# Patient Record
Sex: FEMALE | Race: WHITE | Employment: PART TIME | URBAN - NONMETROPOLITAN AREA
[De-identification: names, ages, dates, MRNs, and addresses within clinical notes are randomized per-mention and may not be internally consistent; named-entity substitution may affect disease eponyms.]

---

## 2017-10-31 ENCOUNTER — OFFICE VISIT (OUTPATIENT)
Dept: UROLOGY | Age: 47
End: 2017-10-31
Payer: MEDICAID

## 2017-10-31 ENCOUNTER — ANESTHESIA (OUTPATIENT)
Dept: OPERATING ROOM | Age: 47
End: 2017-10-31
Payer: MEDICAID

## 2017-10-31 ENCOUNTER — APPOINTMENT (OUTPATIENT)
Dept: GENERAL RADIOLOGY | Age: 47
End: 2017-10-31
Attending: UROLOGY
Payer: MEDICAID

## 2017-10-31 ENCOUNTER — PREP FOR PROCEDURE (OUTPATIENT)
Dept: UROLOGY | Age: 47
End: 2017-10-31

## 2017-10-31 ENCOUNTER — ANESTHESIA EVENT (OUTPATIENT)
Dept: OPERATING ROOM | Age: 47
End: 2017-10-31
Payer: MEDICAID

## 2017-10-31 ENCOUNTER — HOSPITAL ENCOUNTER (OUTPATIENT)
Age: 47
Setting detail: OUTPATIENT SURGERY
Discharge: HOME | End: 2017-10-31
Attending: UROLOGY | Admitting: UROLOGY
Payer: MEDICAID

## 2017-10-31 VITALS
TEMPERATURE: 96.9 F | WEIGHT: 204 LBS | BODY MASS INDEX: 33.99 KG/M2 | HEIGHT: 65 IN | SYSTOLIC BLOOD PRESSURE: 133 MMHG | DIASTOLIC BLOOD PRESSURE: 76 MMHG

## 2017-10-31 VITALS
SYSTOLIC BLOOD PRESSURE: 153 MMHG | RESPIRATION RATE: 11 BRPM | TEMPERATURE: 97.4 F | OXYGEN SATURATION: 97 % | DIASTOLIC BLOOD PRESSURE: 108 MMHG

## 2017-10-31 VITALS
TEMPERATURE: 98 F | SYSTOLIC BLOOD PRESSURE: 130 MMHG | DIASTOLIC BLOOD PRESSURE: 66 MMHG | HEART RATE: 56 BPM | RESPIRATION RATE: 14 BRPM | OXYGEN SATURATION: 100 %

## 2017-10-31 DIAGNOSIS — N20.1 LEFT URETERAL STONE: ICD-10-CM

## 2017-10-31 DIAGNOSIS — N20.1 LEFT URETERAL STONE: Primary | ICD-10-CM

## 2017-10-31 DIAGNOSIS — N23 RENAL COLIC ON LEFT SIDE: ICD-10-CM

## 2017-10-31 DIAGNOSIS — N20.0 KIDNEY STONE ON LEFT SIDE: Primary | ICD-10-CM

## 2017-10-31 LAB
ALBUMIN SERPL-MCNC: 4.1 G/DL (ref 3.5–5.2)
ALP BLD-CCNC: 76 U/L (ref 35–104)
ALT SERPL-CCNC: 11 U/L (ref 5–33)
ANION GAP SERPL CALCULATED.3IONS-SCNC: 12 MMOL/L (ref 7–19)
AST SERPL-CCNC: 19 U/L (ref 5–32)
BACTERIA URINE, POC: ABNORMAL
BILIRUB SERPL-MCNC: 0.5 MG/DL (ref 0.2–1.2)
BILIRUBIN URINE: 1 MG/DL
BLOOD, URINE: POSITIVE
BUN BLDV-MCNC: 20 MG/DL (ref 6–20)
CALCIUM SERPL-MCNC: 9.3 MG/DL (ref 8.6–10)
CASTS URINE, POC: ABNORMAL
CHLORIDE BLD-SCNC: 105 MMOL/L (ref 98–111)
CLARITY: ABNORMAL
CO2: 28 MMOL/L (ref 22–29)
COLOR: YELLOW
CREAT SERPL-MCNC: 1.3 MG/DL (ref 0.5–0.9)
CRYSTALS URINE, POC: ABNORMAL
EPI CELLS URINE, POC: ABNORMAL
GFR NON-AFRICAN AMERICAN: 43
GLUCOSE BLD-MCNC: 96 MG/DL (ref 74–109)
GLUCOSE URINE: ABNORMAL
HCG(URINE) PREGNANCY TEST: NEGATIVE
HCT VFR BLD CALC: 33.4 % (ref 37–47)
HEMOGLOBIN: 10.2 G/DL (ref 12–16)
KETONES, URINE: POSITIVE
LEUKOCYTE EST, POC: ABNORMAL
MCH RBC QN AUTO: 24.8 PG (ref 27–31)
MCHC RBC AUTO-ENTMCNC: 30.5 G/DL (ref 33–37)
MCV RBC AUTO: 81.3 FL (ref 81–99)
NITRITE, URINE: NEGATIVE
PDW BLD-RTO: 14 % (ref 11.5–14.5)
PH UA: 5 (ref 4.5–8)
PLATELET # BLD: 296 K/UL (ref 130–400)
PMV BLD AUTO: 10.1 FL (ref 9.4–12.3)
POTASSIUM SERPL-SCNC: 3.8 MMOL/L (ref 3.5–4.9)
PROTEIN UA: POSITIVE
RBC # BLD: 4.11 M/UL (ref 4.2–5.4)
RBC URINE, POC: ABNORMAL
SODIUM BLD-SCNC: 145 MMOL/L (ref 136–145)
SPECIFIC GRAVITY UA: 1.03 (ref 1–1.03)
TOTAL PROTEIN: 8.2 G/DL (ref 6.6–8.7)
UROBILINOGEN, URINE: NORMAL
WBC # BLD: 8.5 K/UL (ref 4.8–10.8)
WBC URINE, POC: ABNORMAL
YEAST URINE, POC: ABNORMAL

## 2017-10-31 PROCEDURE — 88300 SURGICAL PATH GROSS: CPT

## 2017-10-31 PROCEDURE — 7100000010 HC PHASE II RECOVERY - FIRST 15 MIN: Performed by: UROLOGY

## 2017-10-31 PROCEDURE — 3700000001 HC ADD 15 MINUTES (ANESTHESIA): Performed by: UROLOGY

## 2017-10-31 PROCEDURE — 6360000002 HC RX W HCPCS: Performed by: NURSE ANESTHETIST, CERTIFIED REGISTERED

## 2017-10-31 PROCEDURE — 7100000000 HC PACU RECOVERY - FIRST 15 MIN: Performed by: UROLOGY

## 2017-10-31 PROCEDURE — 6370000000 HC RX 637 (ALT 250 FOR IP): Performed by: UROLOGY

## 2017-10-31 PROCEDURE — 82360 CALCULUS ASSAY QUANT: CPT

## 2017-10-31 PROCEDURE — 80053 COMPREHEN METABOLIC PANEL: CPT

## 2017-10-31 PROCEDURE — C2617 STENT, NON-COR, TEM W/O DEL: HCPCS | Performed by: UROLOGY

## 2017-10-31 PROCEDURE — G8484 FLU IMMUNIZE NO ADMIN: HCPCS | Performed by: PHYSICIAN ASSISTANT

## 2017-10-31 PROCEDURE — 1036F TOBACCO NON-USER: CPT | Performed by: PHYSICIAN ASSISTANT

## 2017-10-31 PROCEDURE — 2580000003 HC RX 258: Performed by: NURSE ANESTHETIST, CERTIFIED REGISTERED

## 2017-10-31 PROCEDURE — G8417 CALC BMI ABV UP PARAM F/U: HCPCS | Performed by: PHYSICIAN ASSISTANT

## 2017-10-31 PROCEDURE — 3600000004 HC SURGERY LEVEL 4 BASE: Performed by: UROLOGY

## 2017-10-31 PROCEDURE — C1769 GUIDE WIRE: HCPCS | Performed by: UROLOGY

## 2017-10-31 PROCEDURE — C1758 CATHETER, URETERAL: HCPCS | Performed by: UROLOGY

## 2017-10-31 PROCEDURE — G8428 CUR MEDS NOT DOCUMENT: HCPCS | Performed by: PHYSICIAN ASSISTANT

## 2017-10-31 PROCEDURE — 3700000000 HC ANESTHESIA ATTENDED CARE: Performed by: UROLOGY

## 2017-10-31 PROCEDURE — 85027 COMPLETE CBC AUTOMATED: CPT

## 2017-10-31 PROCEDURE — 52332 CYSTOSCOPY AND TREATMENT: CPT | Performed by: UROLOGY

## 2017-10-31 PROCEDURE — 2720000000 HC MISC SURG SUPPLY STERILE $0-50: Performed by: UROLOGY

## 2017-10-31 PROCEDURE — 81001 URINALYSIS AUTO W/SCOPE: CPT | Performed by: PHYSICIAN ASSISTANT

## 2017-10-31 PROCEDURE — C1726 CATH, BAL DIL, NON-VASCULAR: HCPCS | Performed by: UROLOGY

## 2017-10-31 PROCEDURE — 52352 CYSTOURETERO W/STONE REMOVE: CPT | Performed by: UROLOGY

## 2017-10-31 PROCEDURE — 93005 ELECTROCARDIOGRAM TRACING: CPT

## 2017-10-31 PROCEDURE — 2500000003 HC RX 250 WO HCPCS: Performed by: NURSE ANESTHETIST, CERTIFIED REGISTERED

## 2017-10-31 PROCEDURE — 6360000002 HC RX W HCPCS: Performed by: UROLOGY

## 2017-10-31 PROCEDURE — 3209999900 FLUORO FOR SURGICAL PROCEDURES

## 2017-10-31 PROCEDURE — 7100000011 HC PHASE II RECOVERY - ADDTL 15 MIN: Performed by: UROLOGY

## 2017-10-31 PROCEDURE — 3600000014 HC SURGERY LEVEL 4 ADDTL 15MIN: Performed by: UROLOGY

## 2017-10-31 PROCEDURE — C1773 RET DEV, INSERTABLE: HCPCS | Performed by: UROLOGY

## 2017-10-31 PROCEDURE — 99202 OFFICE O/P NEW SF 15 MIN: CPT | Performed by: PHYSICIAN ASSISTANT

## 2017-10-31 PROCEDURE — 7100000001 HC PACU RECOVERY - ADDTL 15 MIN: Performed by: UROLOGY

## 2017-10-31 PROCEDURE — 36415 COLL VENOUS BLD VENIPUNCTURE: CPT

## 2017-10-31 PROCEDURE — 81025 URINE PREGNANCY TEST: CPT

## 2017-10-31 DEVICE — URETERAL STENT
Type: IMPLANTABLE DEVICE | Site: URETER | Status: FUNCTIONAL
Brand: POLARIS™ ULTRA

## 2017-10-31 RX ORDER — FENTANYL CITRATE 50 UG/ML
50 INJECTION, SOLUTION INTRAMUSCULAR; INTRAVENOUS
Status: DISCONTINUED | OUTPATIENT
Start: 2017-10-31 | End: 2017-11-01 | Stop reason: HOSPADM

## 2017-10-31 RX ORDER — MORPHINE SULFATE 10 MG/ML
2 INJECTION, SOLUTION INTRAMUSCULAR; INTRAVENOUS EVERY 5 MIN PRN
Status: DISCONTINUED | OUTPATIENT
Start: 2017-10-31 | End: 2017-11-01 | Stop reason: HOSPADM

## 2017-10-31 RX ORDER — ATROPA BELLADONNA AND OPIUM 16.2; 6 MG/1; MG/1
SUPPOSITORY RECTAL PRN
Status: DISCONTINUED | OUTPATIENT
Start: 2017-10-31 | End: 2017-11-01 | Stop reason: HOSPADM

## 2017-10-31 RX ORDER — PROPOFOL 10 MG/ML
INJECTION, EMULSION INTRAVENOUS PRN
Status: DISCONTINUED | OUTPATIENT
Start: 2017-10-31 | End: 2017-10-31 | Stop reason: SDUPTHER

## 2017-10-31 RX ORDER — MORPHINE SULFATE 4 MG/ML
INJECTION, SOLUTION INTRAMUSCULAR; INTRAVENOUS PRN
Status: DISCONTINUED | OUTPATIENT
Start: 2017-10-31 | End: 2017-10-31 | Stop reason: SDUPTHER

## 2017-10-31 RX ORDER — ONDANSETRON 2 MG/ML
INJECTION INTRAMUSCULAR; INTRAVENOUS PRN
Status: DISCONTINUED | OUTPATIENT
Start: 2017-10-31 | End: 2017-10-31 | Stop reason: SDUPTHER

## 2017-10-31 RX ORDER — OXYCODONE HYDROCHLORIDE AND ACETAMINOPHEN 5; 325 MG/1; MG/1
2 TABLET ORAL EVERY 4 HOURS PRN
Status: DISCONTINUED | OUTPATIENT
Start: 2017-10-31 | End: 2017-11-01 | Stop reason: HOSPADM

## 2017-10-31 RX ORDER — TAMSULOSIN HYDROCHLORIDE 0.4 MG/1
0.4 CAPSULE ORAL DAILY
Qty: 14 CAPSULE | Refills: 1 | Status: SHIPPED | OUTPATIENT
Start: 2017-10-31 | End: 2018-03-20

## 2017-10-31 RX ORDER — MIDAZOLAM HYDROCHLORIDE 1 MG/ML
2 INJECTION INTRAMUSCULAR; INTRAVENOUS
Status: DISCONTINUED | OUTPATIENT
Start: 2017-10-31 | End: 2017-11-01 | Stop reason: HOSPADM

## 2017-10-31 RX ORDER — SODIUM CHLORIDE 0.9 % (FLUSH) 0.9 %
10 SYRINGE (ML) INJECTION PRN
Status: DISCONTINUED | OUTPATIENT
Start: 2017-10-31 | End: 2017-11-01 | Stop reason: HOSPADM

## 2017-10-31 RX ORDER — SODIUM CHLORIDE 9 MG/ML
INJECTION, SOLUTION INTRAVENOUS CONTINUOUS
Status: DISCONTINUED | OUTPATIENT
Start: 2017-10-31 | End: 2017-11-01 | Stop reason: HOSPADM

## 2017-10-31 RX ORDER — MORPHINE SULFATE 4 MG/ML
4 INJECTION, SOLUTION INTRAMUSCULAR; INTRAVENOUS EVERY 4 HOURS PRN
Status: DISCONTINUED | OUTPATIENT
Start: 2017-10-31 | End: 2017-11-01 | Stop reason: HOSPADM

## 2017-10-31 RX ORDER — ENALAPRILAT 2.5 MG/2ML
1.25 INJECTION INTRAVENOUS
Status: DISCONTINUED | OUTPATIENT
Start: 2017-10-31 | End: 2017-11-01 | Stop reason: HOSPADM

## 2017-10-31 RX ORDER — HYDRALAZINE HYDROCHLORIDE 20 MG/ML
5 INJECTION INTRAMUSCULAR; INTRAVENOUS EVERY 10 MIN PRN
Status: DISCONTINUED | OUTPATIENT
Start: 2017-10-31 | End: 2017-11-01 | Stop reason: HOSPADM

## 2017-10-31 RX ORDER — ONDANSETRON 2 MG/ML
4 INJECTION INTRAMUSCULAR; INTRAVENOUS EVERY 4 HOURS PRN
Status: DISCONTINUED | OUTPATIENT
Start: 2017-10-31 | End: 2017-11-01 | Stop reason: HOSPADM

## 2017-10-31 RX ORDER — TAMSULOSIN HYDROCHLORIDE 0.4 MG/1
0.4 CAPSULE ORAL ONCE
Status: COMPLETED | OUTPATIENT
Start: 2017-10-31 | End: 2017-10-31

## 2017-10-31 RX ORDER — METOCLOPRAMIDE HYDROCHLORIDE 5 MG/ML
10 INJECTION INTRAMUSCULAR; INTRAVENOUS
Status: DISCONTINUED | OUTPATIENT
Start: 2017-10-31 | End: 2017-11-01 | Stop reason: HOSPADM

## 2017-10-31 RX ORDER — SODIUM CHLORIDE 0.9 % (FLUSH) 0.9 %
10 SYRINGE (ML) INJECTION EVERY 12 HOURS SCHEDULED
Status: DISCONTINUED | OUTPATIENT
Start: 2017-10-31 | End: 2017-11-01 | Stop reason: HOSPADM

## 2017-10-31 RX ORDER — OXYCODONE HYDROCHLORIDE AND ACETAMINOPHEN 5; 325 MG/1; MG/1
2 TABLET ORAL EVERY 4 HOURS PRN
Qty: 30 TABLET | Refills: 0 | Status: SHIPPED | OUTPATIENT
Start: 2017-10-31 | End: 2017-11-07

## 2017-10-31 RX ORDER — PHENAZOPYRIDINE HYDROCHLORIDE 100 MG/1
100 TABLET, FILM COATED ORAL ONCE
Status: COMPLETED | OUTPATIENT
Start: 2017-10-31 | End: 2017-10-31

## 2017-10-31 RX ORDER — FENTANYL CITRATE 50 UG/ML
25 INJECTION, SOLUTION INTRAMUSCULAR; INTRAVENOUS
Status: DISCONTINUED | OUTPATIENT
Start: 2017-10-31 | End: 2017-11-01 | Stop reason: HOSPADM

## 2017-10-31 RX ORDER — CIPROFLOXACIN 500 MG/1
500 TABLET, FILM COATED ORAL 2 TIMES DAILY
Qty: 14 TABLET | Refills: 0 | Status: SHIPPED | OUTPATIENT
Start: 2017-10-31 | End: 2017-11-07

## 2017-10-31 RX ORDER — LIDOCAINE HYDROCHLORIDE 10 MG/ML
INJECTION, SOLUTION EPIDURAL; INFILTRATION; INTRACAUDAL; PERINEURAL PRN
Status: DISCONTINUED | OUTPATIENT
Start: 2017-10-31 | End: 2017-10-31 | Stop reason: SDUPTHER

## 2017-10-31 RX ORDER — SODIUM CHLORIDE, SODIUM LACTATE, POTASSIUM CHLORIDE, CALCIUM CHLORIDE 600; 310; 30; 20 MG/100ML; MG/100ML; MG/100ML; MG/100ML
INJECTION, SOLUTION INTRAVENOUS CONTINUOUS
Status: DISCONTINUED | OUTPATIENT
Start: 2017-10-31 | End: 2017-11-01 | Stop reason: HOSPADM

## 2017-10-31 RX ORDER — PHENAZOPYRIDINE HYDROCHLORIDE 100 MG/1
100 TABLET, FILM COATED ORAL 3 TIMES DAILY PRN
Qty: 30 TABLET | Refills: 1 | Status: SHIPPED | OUTPATIENT
Start: 2017-10-31 | End: 2017-12-19

## 2017-10-31 RX ORDER — ROCURONIUM BROMIDE 10 MG/ML
INJECTION, SOLUTION INTRAVENOUS PRN
Status: DISCONTINUED | OUTPATIENT
Start: 2017-10-31 | End: 2017-10-31 | Stop reason: SDUPTHER

## 2017-10-31 RX ORDER — PROMETHAZINE HYDROCHLORIDE 25 MG/ML
6.25 INJECTION, SOLUTION INTRAMUSCULAR; INTRAVENOUS
Status: DISCONTINUED | OUTPATIENT
Start: 2017-10-31 | End: 2017-11-01 | Stop reason: HOSPADM

## 2017-10-31 RX ORDER — LIDOCAINE HYDROCHLORIDE 10 MG/ML
1 INJECTION, SOLUTION EPIDURAL; INFILTRATION; INTRACAUDAL; PERINEURAL
Status: DISCONTINUED | OUTPATIENT
Start: 2017-10-31 | End: 2017-11-01 | Stop reason: HOSPADM

## 2017-10-31 RX ORDER — MORPHINE SULFATE 10 MG/ML
4 INJECTION, SOLUTION INTRAMUSCULAR; INTRAVENOUS EVERY 5 MIN PRN
Status: DISCONTINUED | OUTPATIENT
Start: 2017-10-31 | End: 2017-11-01 | Stop reason: HOSPADM

## 2017-10-31 RX ORDER — MEPERIDINE HYDROCHLORIDE 50 MG/ML
12.5 INJECTION INTRAMUSCULAR; INTRAVENOUS; SUBCUTANEOUS EVERY 5 MIN PRN
Status: DISCONTINUED | OUTPATIENT
Start: 2017-10-31 | End: 2017-11-01 | Stop reason: HOSPADM

## 2017-10-31 RX ORDER — DIPHENHYDRAMINE HYDROCHLORIDE 50 MG/ML
12.5 INJECTION INTRAMUSCULAR; INTRAVENOUS
Status: DISCONTINUED | OUTPATIENT
Start: 2017-10-31 | End: 2017-11-01 | Stop reason: HOSPADM

## 2017-10-31 RX ORDER — DEXAMETHASONE SODIUM PHOSPHATE 10 MG/ML
INJECTION INTRAMUSCULAR; INTRAVENOUS PRN
Status: DISCONTINUED | OUTPATIENT
Start: 2017-10-31 | End: 2017-10-31 | Stop reason: SDUPTHER

## 2017-10-31 RX ORDER — LABETALOL HYDROCHLORIDE 5 MG/ML
5 INJECTION, SOLUTION INTRAVENOUS EVERY 10 MIN PRN
Status: DISCONTINUED | OUTPATIENT
Start: 2017-10-31 | End: 2017-11-01 | Stop reason: HOSPADM

## 2017-10-31 RX ORDER — SODIUM CHLORIDE, SODIUM LACTATE, POTASSIUM CHLORIDE, CALCIUM CHLORIDE 600; 310; 30; 20 MG/100ML; MG/100ML; MG/100ML; MG/100ML
INJECTION, SOLUTION INTRAVENOUS CONTINUOUS PRN
Status: DISCONTINUED | OUTPATIENT
Start: 2017-10-31 | End: 2017-10-31 | Stop reason: SDUPTHER

## 2017-10-31 RX ADMIN — MORPHINE SULFATE 4 MG: 4 INJECTION INTRAVENOUS at 20:15

## 2017-10-31 RX ADMIN — TAMSULOSIN HYDROCHLORIDE 0.4 MG: 0.4 CAPSULE ORAL at 21:14

## 2017-10-31 RX ADMIN — ONDANSETRON HYDROCHLORIDE 4 MG: 2 SOLUTION INTRAMUSCULAR; INTRAVENOUS at 20:34

## 2017-10-31 RX ADMIN — SUGAMMADEX 250 MG: 100 INJECTION, SOLUTION INTRAVENOUS at 20:44

## 2017-10-31 RX ADMIN — CEFTRIAXONE 1 G: 1 INJECTION, SOLUTION INTRAVENOUS at 20:23

## 2017-10-31 RX ADMIN — SODIUM CHLORIDE, SODIUM LACTATE, POTASSIUM CHLORIDE, AND CALCIUM CHLORIDE: 600; 310; 30; 20 INJECTION, SOLUTION INTRAVENOUS at 20:06

## 2017-10-31 RX ADMIN — DEXAMETHASONE SODIUM PHOSPHATE 10 MG: 10 INJECTION INTRAMUSCULAR; INTRAVENOUS at 20:27

## 2017-10-31 RX ADMIN — SODIUM CHLORIDE, SODIUM LACTATE, POTASSIUM CHLORIDE, AND CALCIUM CHLORIDE: 600; 310; 30; 20 INJECTION, SOLUTION INTRAVENOUS at 20:34

## 2017-10-31 RX ADMIN — PHENAZOPYRIDINE HYDROCHLORIDE 100 MG: 100 TABLET ORAL at 21:14

## 2017-10-31 RX ADMIN — PROPOFOL 200 MG: 10 INJECTION, EMULSION INTRAVENOUS at 20:15

## 2017-10-31 RX ADMIN — ROCURONIUM BROMIDE 40 MG: 10 INJECTION INTRAVENOUS at 20:16

## 2017-10-31 RX ADMIN — LIDOCAINE HYDROCHLORIDE 50 MG: 10 INJECTION, SOLUTION EPIDURAL; INFILTRATION; INTRACAUDAL; PERINEURAL at 20:15

## 2017-10-31 ASSESSMENT — PAIN SCALES - GENERAL: PAINLEVEL_OUTOF10: 2

## 2017-10-31 ASSESSMENT — ENCOUNTER SYMPTOMS
EYE PAIN: 0
WHEEZING: 0
BLURRED VISION: 0
SHORTNESS OF BREATH: 0
VOMITING: 0
BACK PAIN: 0
SINUS PAIN: 0
ABDOMINAL PAIN: 0

## 2017-10-31 ASSESSMENT — PAIN DESCRIPTION - DESCRIPTORS: DESCRIPTORS: SORE

## 2017-10-31 ASSESSMENT — PAIN DESCRIPTION - LOCATION: LOCATION: BACK

## 2017-10-31 ASSESSMENT — PAIN DESCRIPTION - ORIENTATION: ORIENTATION: LEFT

## 2017-10-31 ASSESSMENT — PAIN DESCRIPTION - PAIN TYPE
TYPE: SURGICAL PAIN
TYPE: SURGICAL PAIN

## 2017-10-31 NOTE — H&P
Place 1 drop into both eyes daily         L-Lysine 1000 MG TABS Take by mouth Take one tablet on Fridays        naproxen (NAPROSYN) 500 MG tablet Take 1 tablet by mouth daily (Patient taking differently: Take 500 mg by mouth daily as needed ) 60 tablet 5      No current facility-administered medications for this visit.                   Allergies   Allergen Reactions    Clindamycin/Lincomycin Other (See Comments)       Pt had rectal bleeding while on this medication         Social History   Social History            Social History    Marital status: Single       Spouse name: N/A    Number of children: N/A    Years of education: N/A            Social History Main Topics    Smoking status: Former Smoker       Packs/day: 0.50       Years: 5.00       Types: Cigarettes       Quit date: 1/1/2004    Smokeless tobacco: Never Used    Alcohol use No    Drug use: Unknown    Sexual activity: Not Asked           Other Topics Concern    None          Social History Narrative    None            Family History         Family History   Problem Relation Age of Onset    Substance Abuse Father      Heart Disease Paternal Grandmother              REVIEW OF SYSTEMS:  Review of Systems   Constitutional: Negative for malaise/fatigue and weight loss. HENT: Negative for nosebleeds and sinus pain. Eyes: Negative for blurred vision and pain. Respiratory: Negative for shortness of breath and wheezing. Cardiovascular: Negative for palpitations and leg swelling. Gastrointestinal: Negative for abdominal pain and vomiting. Genitourinary: Positive for flank pain, frequency, hematuria and urgency. Negative for dysuria. Musculoskeletal: Negative for back pain and myalgias. Skin: Negative for itching and rash. Neurological: Negative for tremors and sensory change. Endo/Heme/Allergies: Negative for environmental allergies and polydipsia. Psychiatric/Behavioral: Negative for hallucinations.  The patient is not nervous/anxious.          PHYSICAL EXAM:  /76   Temp 96.9 °F (36.1 °C) (Temporal)   Ht 5' 5\" (1.651 m)   Wt 204 lb (92.5 kg)   BMI 33.95 kg/m²   Physical Exam   Constitutional: No distress. HENT:   Mouth/Throat: No oropharyngeal exudate. Eyes: Left eye exhibits no discharge. No scleral icterus. Neck: No JVD present. No tracheal deviation present. No thyromegaly present. Cardiovascular: Exam reveals no gallop and no friction rub. Pulmonary/Chest: No stridor. She has no rales. Abdominal: She exhibits no distension and no mass. There is no rebound and no guarding. Musculoskeletal: She exhibits no edema. Neurological: No cranial nerve deficit. She exhibits normal muscle tone. Coordination normal.   Skin: She is not diaphoretic. No pallor.               DATA:  U/A:          Lab Results   Component Value Date     COLORU Yellow 10/31/2017     PROTEINU Positive 10/31/2017     PHUR 5.0 10/31/2017     CLARITYU Cloudy 10/31/2017     SPECGRAV 1.030 10/31/2017     LEUKOCYTESUR trace 10/31/2017     UROBILINOGEN Normal 10/31/2017     BILIRUBINUR 1 10/31/2017     BLOODU Positive 10/31/2017     GLUCOSEU neg 10/31/2017             Imaging:  CT reveals 7 mm left distal UVJ stone with some obstructive changes.     1. Kidney stone on left side     - POC URINE with Microscopic     2. Left ureteral stone  Plan on cystoscopy left ureteroscopy laser lithotripsy placement of double-J stent.     3.  Renal colic on left side     Discussed with Dr. Grady Mistry patient will be added on this afternoon for cystoscopy left ureteroscopy laser lithotripsy patient has been nothing by mouth since last night.         Orders Placed This Encounter   Procedures    POC URINE with Microscopic        Encounter Medications    No orders of the defined types were placed in this encounter.      Plan:  Plan on cystoscopy left ureteroscopy laser lithotripsy placement of left double-J stent.             Office Visit on 10/31/2017      Revision History            Detailed Report           Note shared with patient   Progress Notes Info     Author Note Status Last Update User   Amira Fraser PA-C Signed Amira Fraser PA-C   Last Update Date/Time: 10/31/2017  2:56 PM   Chart Review Routing History     Routing history could not be found for this note. This is because the note has never been routed or because communication record creation was suppressed.

## 2017-10-31 NOTE — PROGRESS NOTES
Laura James is a 50 y.o. female who presents today   Chief Complaint   Patient presents with    New Patient     i am here today fu from [facility] [place] kidney stone     Urolithiasis  Patient complains of left abdominal pain and flank pain without radiation to the groin. Onset of symptoms was abrupt 1 day ago with unchanged course since that time. Patient describes the pain as colicky and cramping, continuous and rated as severe. The patient has had nausea and vomiting and no diaphoresis. There has been no fever or chills. The patient is not complaining of dysuria or frequency. No previous history of stones. Went to [facility] last night CT scan reveals approximate 7 mm left UPJ stone with moderate obstructive changes. Patient has been nothing by mouth. Past Medical History:   Diagnosis Date    Anemia     Chronic cholecystitis without calculus 6/6/2016    Degenerative disc disease     Glaucoma (increased eye pressure)     candidate for due to high pressure/ preventative eye drops       Past Surgical History:   Procedure Laterality Date    CHOLECYSTECTOMY  06/06/2016    CHOLECYSTECTOMY  06/06/2016       Current Outpatient Prescriptions   Medication Sig Dispense Refill    fluticasone (FLONASE) 50 MCG/ACT nasal spray 2 sprays by Nasal route daily 1 Bottle 3    cetirizine (ZYRTEC ALLERGY) 10 MG tablet Take 1 tablet by mouth daily 30 tablet 3    omeprazole (PRILOSEC) 20 MG delayed release capsule Take 1 capsule by mouth Daily 30 capsule 5    Psyllium (METAMUCIL PO) Take by mouth      Loratadine 10 MG CAPS Take by mouth daily Indications:  Allergic Rhinitis      timolol (TIMOPTIC) 0.5 % ophthalmic solution Place 1 drop into both eyes daily       L-Lysine 1000 MG TABS Take by mouth Take one tablet on Fridays      naproxen (NAPROSYN) 500 MG tablet Take 1 tablet by mouth daily (Patient taking differently: Take 500 mg by mouth daily as needed ) 60 tablet 5     No current facility-administered medications for this visit. Allergies   Allergen Reactions    Clindamycin/Lincomycin Other (See Comments)     Pt had rectal bleeding while on this medication       Social History     Social History    Marital status: Single     Spouse name: N/A    Number of children: N/A    Years of education: N/A     Social History Main Topics    Smoking status: Former Smoker     Packs/day: 0.50     Years: 5.00     Types: Cigarettes     Quit date: 1/1/2004    Smokeless tobacco: Never Used    Alcohol use No    Drug use: Unknown    Sexual activity: Not Asked     Other Topics Concern    None     Social History Narrative    None       Family History   Problem Relation Age of Onset    Substance Abuse Father     Heart Disease Paternal Grandmother        REVIEW OF SYSTEMS:  Review of Systems   Constitutional: Negative for malaise/fatigue and weight loss. HENT: Negative for nosebleeds and sinus pain. Eyes: Negative for blurred vision and pain. Respiratory: Negative for shortness of breath and wheezing. Cardiovascular: Negative for palpitations and leg swelling. Gastrointestinal: Negative for abdominal pain and vomiting. Genitourinary: Positive for flank pain, frequency, hematuria and urgency. Negative for dysuria. Musculoskeletal: Negative for back pain and myalgias. Skin: Negative for itching and rash. Neurological: Negative for tremors and sensory change. Endo/Heme/Allergies: Negative for environmental allergies and polydipsia. Psychiatric/Behavioral: Negative for hallucinations. The patient is not nervous/anxious. PHYSICAL EXAM:  /76   Temp 96.9 °F (36.1 °C) (Temporal)   Ht 5' 5\" (1.651 m)   Wt 204 lb (92.5 kg)   BMI 33.95 kg/m²   Physical Exam   Constitutional: No distress. HENT:   Mouth/Throat: No oropharyngeal exudate. Eyes: Left eye exhibits no discharge. No scleral icterus. Neck: No JVD present. No tracheal deviation present. No thyromegaly present.    Cardiovascular: Exam reveals no gallop and no friction rub. Pulmonary/Chest: No stridor. She has no rales. Abdominal: She exhibits no distension and no mass. There is no rebound and no guarding. Musculoskeletal: She exhibits no edema. Neurological: No cranial nerve deficit. She exhibits normal muscle tone. Coordination normal.   Skin: She is not diaphoretic. No pallor. DATA:  U/A:    Lab Results   Component Value Date    COLORU Yellow 10/31/2017    PROTEINU Positive 10/31/2017    PHUR 5.0 10/31/2017    CLARITYU Cloudy 10/31/2017    SPECGRAV 1.030 10/31/2017    LEUKOCYTESUR trace 10/31/2017    UROBILINOGEN Normal 10/31/2017    BILIRUBINUR 1 10/31/2017    BLOODU Positive 10/31/2017    GLUCOSEU neg 10/31/2017           Imaging:  CT reveals 7 mm left distal UVJ stone with some obstructive changes. 1. Kidney stone on left side    - POC URINE with Microscopic    2. Left ureteral stone  Plan on cystoscopy left ureteroscopy laser lithotripsy placement of double-J stent. 3. Renal colic on left side    Discussed with Dr. Marilu Ortega patient will be added on this afternoon for cystoscopy left ureteroscopy laser lithotripsy patient has been nothing by mouth since last night. Orders Placed This Encounter   Procedures    POC URINE with Microscopic     No orders of the defined types were placed in this encounter. Plan:  Plan on cystoscopy left ureteroscopy laser lithotripsy placement of left double-J stent.

## 2017-10-31 NOTE — ANESTHESIA PRE PROCEDURE
for: PROTIME, INR, APTT    HCG (If Applicable):   Lab Results   Component Value Date    PREGTESTUR Negative 06/06/2016        ABGs: No results found for: PHART, PO2ART, OVT3FHQ, WQD0HGM, BEART, N6CWMSMG     Type & Screen (If Applicable):  No results found for: LABABO, [address]    Anesthesia Evaluation  Patient summary reviewed and Nursing notes reviewed no history of anesthetic complications:   Airway: Mallampati: II  TM distance: <3 FB   Neck ROM: full  Mouth opening: > = 3 FB Dental:          Pulmonary:negative ROS breath sounds clear to auscultation                             Cardiovascular:  Exercise tolerance: good (>4 METS),       (-) pacemaker, hypertension, past MI, CABG/stent and dysrhythmias    ECG reviewed  Rhythm: regular             Beta Blocker:  Not on Beta Blocker      ROS comment: Ekg:  Sinus arrythmia  Rate 62     Neuro/Psych:      (-) seizures and CVA           GI/Hepatic/Renal:   (+) GERD (food related only): no interval change, renal disease: kidney stones,      (-) liver disease       Endo/Other:        (-) hypothyroidism, blood dyscrasia, no Type II DM               Abdominal:         (-) obese Abdomen: soft. Vascular:     - DVT and PE. Anesthesia Plan      general     ASA 2     (Pregnancy test pending. Please confirm prior to OR.)  Induction: intravenous. BIS  MIPS: Postoperative opioids intended and Prophylactic antiemetics administered. Anesthetic plan and risks discussed with patient. Use of blood products discussed with patient whom consented to blood products.                    Lillian Michael DO   10/31/2017

## 2017-11-01 LAB
EKG P AXIS: 52 DEGREES
EKG P-R INTERVAL: 132 MS
EKG Q-T INTERVAL: 444 MS
EKG QRS DURATION: 74 MS
EKG QTC CALCULATION (BAZETT): 444 MS
EKG T AXIS: 41 DEGREES

## 2017-11-01 NOTE — BRIEF OP NOTE
Urology Brief Op Note    Patient Name: Vasiliy Andrews    : 1970    MRN: [id number]    Pre-operative Diagnosis: STONE left ureter    Post-operative Diagnosis: Same     Procedure: Procedure(s):  CYSTOSCOPY URETEROSCOPY stone basket removal STENT PLACEMENT    Anesthesia: General    Surgeon: Bonifacio Correa MD    Assistants: Scrub Person First: Calixto Ibrahim    Estimated Blood Loss: * No values recorded between 10/31/2017  8:11 PM and 10/31/2017  1:81 PM *    Complications: none    Findings: distal left stone 5x28 DJ stent with string    Specimens:    ID Type Source Tests Collected by Time Destination   A : left ureteral stone Tissue Ureter SURGICAL PATHOLOGY Bonifacio Corera MD 10/31/2017 2038        Disposition/Plan: To PACU in stable condition. Then to [facility] out patient.       Bonifacio Correa MD  10/31/2017  8:52 PM

## 2017-11-01 NOTE — OP NOTE
[facility] [address] [facility]                               OPERATIVE REPORT    PATIENT NAME: Laxmi Perez                         :             1970  MED REC NO:   [id number]                               ROOM:  ACCOUNT NO:   [de-identified]                            ADMISSION DATE:  10/31/2017  PROVIDER:     Trinidad Infante MD      DATE OF PROCEDURE:  10/31/2017    PREOPERATIVE DIAGNOSIS:  Left ureteral calculus. POSTOPERATIVE DIAGNOSIS:  Left ureteral calculus. OPERATION PERFORMED:  Left ureteroscopy, stone basket extraction and  placement of a 5-Citizen of [place] x 28 cm left double-J ureteral stent. ATTENDING SURGEON:  Trinidad Infante MD    ANESTHESIA:  General.    HISTORY:  The patient is 49-year-old female who was in [facility]  Emergency Room yesterday, came to the office today with symptoms  complaining of severe left renal colic. A CT scan showed a stone in  the distal left ureter measuring approximately 7 mm. Therefore,  because of severity of her symptoms, she was offered urologic  intervention and she now presents to undergo left ureteroscopy, laser  lithotripsy, stone extraction and stent placement. Risks and  complications of procedure have been discussed with her. DESCRIPTION OF PROCEDURE:  The patient was brought to the operating  room, underwent general anesthetic. She was placed in the lithotomy  position. Genitalia was prepped and draped per routine sterile  fashion. Time-out was performed. She received a preoperative  antibiotic. A 22-Citizen of [place] cystoscope was inserted into the meatus. Bladder was  briefly inspected with 30-degree lens and appeared to be normal.  The  left ureteral orifice was then intubated with a 5-Citizen of [place] open-ended  catheter, contrast was injected retrograde to opacify the distal left  ureter, and her filling defect was seen in the distal left ureter  consistent with a stone.   With the aid of the catheter, I passed a  0.035 sensor tip guidewire up and advanced this under fluoroscopic  guidance up into the kidney. Over this guidewire, a 5 mm balloon  dilator was used to dilate the orifice. I then did ureteroscopy with a mini semi-rigid ureteroscope. Stone  was seen within the lumen of the ureter. This was engaged in a [place]  basket and the stone was extracted without difficulty. Tonyverenice Joyce was sent  for analysis. Guidewire was backloaded with the cystoscope. Cystoscope was advanced  in the bladder. I then passed a 5-Iranian catheter up into the left  kidney which was hydronephrotic. Contrast was injected to opacify the  kidney and measured for the stent. I then placed a 5-Iranian x 28 cm left double-J ureteral stent, using  cystoscopic and fluoroscopic guidance. String was left on the end of  the stent. The patient's bladder was emptied. Procedure was  terminated. She was awakened from her anesthetic and taken to the recovery room in  stable condition.   She will follow up in 1 week for stent removal.        Tran Klein MD    D: 10/31/2017 21:51:12       T: 11/01/2017 3:09:38     [id number]  Job#: [id number]     Doc#: [id number]

## 2017-11-01 NOTE — ANESTHESIA POSTPROCEDURE EVALUATION
Department of Anesthesiology  Postprocedure Note    Patient: Carolee Holter  MRN: [id number]  YOB: 1970  Date of evaluation: 10/31/2017  Time:  8:56 PM     Procedure Summary     Date:  10/31/17 Room / Location:  [facility] OR [facility] / [facility] OR    Anesthesia Start:  2006 Anesthesia Stop:  2056    Procedure:  CYSTOSCOPY URETEROSCOPY stone removal STENT PLACEMENT (Left ) Diagnosis:  (STONE)    Surgeon:  Danelle oRgers MD Responsible Provider:  Kayla Winn CRNA    Anesthesia Type:  general ASA Status:  2          Anesthesia Type: general    Chaya Phase I: Chaya Score: 10    Chaya Phase II:      Last vitals: Reviewed and per EMR flowsheets.        Anesthesia Post Evaluation    Patient location during evaluation: PACU  Patient participation: complete - patient participated  Level of consciousness: awake and alert  Pain score: 0  Airway patency: patent  Nausea & Vomiting: no nausea and no vomiting  Complications: no  Cardiovascular status: hemodynamically stable  Respiratory status: spontaneous ventilation and room air  Hydration status: stable

## 2017-11-01 NOTE — DISCHARGE INSTR - DIET
 Good nutrition is important when healing from an illness, injury, or surgery. Follow any nutrition recommendations given to you during your hospital stay.    RESUME USUAL DIET

## 2017-11-05 LAB
CALCULI COMPOSITION: NORMAL
MASS: 64 MG
STONE DESCRIPTION: NORMAL
STONE NUMBER: 1
STONE SIZE: NORMAL MM

## 2017-11-07 ENCOUNTER — OFFICE VISIT (OUTPATIENT)
Dept: UROLOGY | Age: 47
End: 2017-11-07
Payer: MEDICAID

## 2017-11-07 VITALS — TEMPERATURE: 98.2 F | WEIGHT: 204 LBS | BODY MASS INDEX: 33.99 KG/M2 | HEIGHT: 65 IN

## 2017-11-07 DIAGNOSIS — N20.1 LEFT URETERAL STONE: Primary | ICD-10-CM

## 2017-11-07 PROCEDURE — G8484 FLU IMMUNIZE NO ADMIN: HCPCS | Performed by: PHYSICIAN ASSISTANT

## 2017-11-07 PROCEDURE — G8428 CUR MEDS NOT DOCUMENT: HCPCS | Performed by: PHYSICIAN ASSISTANT

## 2017-11-07 PROCEDURE — G8417 CALC BMI ABV UP PARAM F/U: HCPCS | Performed by: PHYSICIAN ASSISTANT

## 2017-11-07 PROCEDURE — 99213 OFFICE O/P EST LOW 20 MIN: CPT | Performed by: PHYSICIAN ASSISTANT

## 2017-11-07 PROCEDURE — 1036F TOBACCO NON-USER: CPT | Performed by: PHYSICIAN ASSISTANT

## 2017-11-07 ASSESSMENT — ENCOUNTER SYMPTOMS
WHEEZING: 0
BACK PAIN: 0
SHORTNESS OF BREATH: 0
BLURRED VISION: 0
SINUS PAIN: 0
EYE PAIN: 0
VOMITING: 0
ABDOMINAL PAIN: 0

## 2017-11-07 NOTE — LETTER
[facility] UROLOGY   [address] [facility] [address]  [address], [place] [address]  PHONE [phone]    Patient Name: Chyna Ovalle  YOB: 1970                                         _____LOCATION: [facility]    _____LOCATION: [address] [place] [address]        DATE:  TIME:        RENAL ULTRA SOUND PREP    DO NOT EAT OR DRINK ANYTHING AFTER MIDNIGHT NIGHT PRIOR.    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

## 2017-12-18 ENCOUNTER — HOSPITAL ENCOUNTER (OUTPATIENT)
Dept: ULTRASOUND IMAGING | Age: 47
Discharge: HOME | End: 2017-12-18
Payer: MEDICAID

## 2017-12-18 DIAGNOSIS — N20.1 LEFT URETERAL STONE: ICD-10-CM

## 2017-12-18 PROCEDURE — 76770 US EXAM ABDO BACK WALL COMP: CPT

## 2017-12-19 ENCOUNTER — OFFICE VISIT (OUTPATIENT)
Dept: UROLOGY | Age: 47
End: 2017-12-19
Payer: MEDICAID

## 2017-12-19 VITALS
HEIGHT: 65 IN | BODY MASS INDEX: 34.99 KG/M2 | HEART RATE: 84 BPM | WEIGHT: 210 LBS | SYSTOLIC BLOOD PRESSURE: 122 MMHG | DIASTOLIC BLOOD PRESSURE: 74 MMHG | TEMPERATURE: 97.4 F

## 2017-12-19 DIAGNOSIS — N20.1 LEFT URETERAL STONE: Primary | ICD-10-CM

## 2017-12-19 LAB
BACTERIA URINE, POC: NORMAL
BILIRUBIN URINE: 0 MG/DL
BLOOD, URINE: POSITIVE
CASTS URINE, POC: NORMAL
CLARITY: NORMAL
COLOR: NORMAL
CRYSTALS URINE, POC: NORMAL
EPI CELLS URINE, POC: NORMAL
GLUCOSE URINE: NORMAL
KETONES, URINE: NEGATIVE
LEUKOCYTE EST, POC: NORMAL
NITRITE, URINE: NEGATIVE
PH UA: 5 (ref 4.5–8)
PROTEIN UA: NEGATIVE
RBC URINE, POC: NORMAL
SPECIFIC GRAVITY UA: 1.02 (ref 1–1.03)
UROBILINOGEN, URINE: NORMAL
WBC URINE, POC: NORMAL
YEAST URINE, POC: NORMAL

## 2017-12-19 PROCEDURE — G8427 DOCREV CUR MEDS BY ELIG CLIN: HCPCS | Performed by: PHYSICIAN ASSISTANT

## 2017-12-19 PROCEDURE — 99213 OFFICE O/P EST LOW 20 MIN: CPT | Performed by: PHYSICIAN ASSISTANT

## 2017-12-19 PROCEDURE — 1036F TOBACCO NON-USER: CPT | Performed by: PHYSICIAN ASSISTANT

## 2017-12-19 PROCEDURE — 81000 URINALYSIS NONAUTO W/SCOPE: CPT | Performed by: PHYSICIAN ASSISTANT

## 2017-12-19 PROCEDURE — G8484 FLU IMMUNIZE NO ADMIN: HCPCS | Performed by: PHYSICIAN ASSISTANT

## 2017-12-19 PROCEDURE — G8417 CALC BMI ABV UP PARAM F/U: HCPCS | Performed by: PHYSICIAN ASSISTANT

## 2017-12-19 RX ORDER — NICOTINE POLACRILEX 4 MG/1
GUM, CHEWING ORAL
Refills: 5 | COMMUNITY
Start: 2017-12-12 | End: 2017-12-19 | Stop reason: SDUPTHER

## 2017-12-19 ASSESSMENT — ENCOUNTER SYMPTOMS
HEARTBURN: 0
NAUSEA: 0
SHORTNESS OF BREATH: 0
EYE DISCHARGE: 0
WHEEZING: 0
EYE REDNESS: 0

## 2017-12-19 NOTE — PROGRESS NOTES
Allergen Reactions    Clindamycin/Lincomycin Other (See Comments)     Pt had rectal bleeding while on this medication       Social History     Social History    Marital status: Single     Spouse name: N/A    Number of children: N/A    Years of education: N/A     Social History Main Topics    Smoking status: Former Smoker     Packs/day: 0.50     Years: 5.00     Types: Cigarettes     Quit date: 1/1/2004    Smokeless tobacco: Never Used    Alcohol use No    Drug use: No    Sexual activity: No     Other Topics Concern    None     Social History Narrative    None       Family History   Problem Relation Age of Onset    Substance Abuse Father     Heart Disease Paternal Grandmother        REVIEW OF SYSTEMS:  Review of Systems   Constitutional: Negative for chills and fever. HENT: Negative for hearing loss. Eyes: Negative for discharge and redness. Respiratory: Negative for shortness of breath and wheezing. Cardiovascular: Negative for leg swelling and PND. Gastrointestinal: Negative for heartburn and nausea. Genitourinary: Negative for dysuria, flank pain, frequency, hematuria and urgency. Musculoskeletal: Negative for myalgias and neck pain. Skin: Negative for itching and rash. Neurological: Negative for seizures, loss of consciousness and headaches. Endo/Heme/Allergies: Negative for environmental allergies and polydipsia. Psychiatric/Behavioral: Negative for depression and suicidal ideas. PHYSICAL EXAM:  /74   Pulse 84   Temp 97.4 °F (36.3 °C) (Temporal)   Ht 5' 5\" (1.651 m)   Wt 210 lb (95.3 kg)   BMI 34.95 kg/m²   Physical Exam   Constitutional: No distress. HENT:   Mouth/Throat: No oropharyngeal exudate. Eyes: Left eye exhibits no discharge. No scleral icterus. Neck: No JVD present. No tracheal deviation present. No thyromegaly present. Cardiovascular: Exam reveals no gallop and no friction rub. Pulmonary/Chest: No stridor. She has no rales. Abdominal: She exhibits no distension and no mass. There is no rebound and no guarding. Musculoskeletal: She exhibits no edema. Neurological: No cranial nerve deficit. She exhibits normal muscle tone. Coordination normal.   Skin: She is not diaphoretic. No pallor. DATA:  U/A:    Lab Results   Component Value Date    COLORU Yellow 10/31/2017    PROTEINU Negative 12/19/2017    PHUR 5.0 12/19/2017    CLARITYU Cloudy 10/31/2017    SPECGRAV 1.025 12/19/2017    LEUKOCYTESUR Trace 12/19/2017    UROBILINOGEN Normal 12/19/2017    BILIRUBINUR 0 12/19/2017    BLOODU Positive 12/19/2017    GLUCOSEU neg 12/19/2017           Imaging:  TECHNIQUE: Multiple longitudinal and transverse realtime sonographic   images of the kidneys and urinary bladder are obtained. FINDINGS:    The right kidney measures 4.5 x 5.2 x 9.5 cm. The right kidney is   normal in size, shape, contour and position. The cortical thickness is   normal, with preservation of the corticomedullary differentiation. The   central echo complex is compact with no evidence for hydronephrosis. No nephrolithiasis or abnormal perinephric fluid collections are seen. No hydroureter. The left kidney measures 5.1 x 5.4 x 11 cm. The left kidney is normal   in size, shape, contour and position. The cortical thickness is   normal, with preservation of the corticomedullary differentiation. The   central echo complex is compact with no evidence for hydronephrosis. No nephrolithiasis or abnormal perinephric fluid collections are seen. No hydroureter. Scanning through the pelvis reveals the bladder to be moderately   distended with anechoic urine. The wall thickness and contour are   normal. There is no surrounding ascites. 1. Left ureteral stone  Symptomatically patient is doing well. Renal ultrasound is negative. Patient had left ureteroscopy 10/31/17.  We discussed metabolic stone evaluation however she would like to wait and I will have her return in 3 months with a KUB if KUB is negative in 3 months we'll suggest follow-up in 6 months. - XR ABDOMEN (KUB) (SINGLE AP VIEW); Future  - POC Urine with Microscopic      Orders Placed This Encounter   Procedures    XR ABDOMEN (KUB) (SINGLE AP VIEW)     Standing Status:   Future     Standing Expiration Date:   12/19/2018     Order Specific Question:   Reason for exam:     Answer:   kidney stones    POC Urine with Microscopic     No orders of the defined types were placed in this encounter. Plan:  Follow-up in 3 months with KUB.

## 2018-03-20 ENCOUNTER — OFFICE VISIT (OUTPATIENT)
Dept: UROLOGY | Age: 48
End: 2018-03-20
Payer: MEDICAID

## 2018-03-20 ENCOUNTER — HOSPITAL ENCOUNTER (OUTPATIENT)
Dept: GENERAL RADIOLOGY | Age: 48
Discharge: HOME | End: 2018-03-20
Payer: MEDICAID

## 2018-03-20 VITALS
HEART RATE: 98 BPM | TEMPERATURE: 97.7 F | BODY MASS INDEX: 35.32 KG/M2 | OXYGEN SATURATION: 98 % | DIASTOLIC BLOOD PRESSURE: 74 MMHG | HEIGHT: 65 IN | WEIGHT: 212 LBS | SYSTOLIC BLOOD PRESSURE: 132 MMHG

## 2018-03-20 DIAGNOSIS — N20.1 LEFT URETERAL STONE: ICD-10-CM

## 2018-03-20 DIAGNOSIS — N20.1 LEFT URETERAL STONE: Primary | ICD-10-CM

## 2018-03-20 LAB
APPEARANCE FLUID: CLEAR
BILIRUBIN, POC: NORMAL
BLOOD URINE, POC: NORMAL
CLARITY, POC: NORMAL
COLOR, POC: NORMAL
GLUCOSE URINE, POC: NORMAL
KETONES, POC: NORMAL
LEUKOCYTE EST, POC: NORMAL
NITRITE, POC: NORMAL
PH, POC: 5.5
PROTEIN, POC: NORMAL
SPECIFIC GRAVITY, POC: 1.02
UROBILINOGEN, POC: 0.2

## 2018-03-20 PROCEDURE — G8417 CALC BMI ABV UP PARAM F/U: HCPCS | Performed by: PHYSICIAN ASSISTANT

## 2018-03-20 PROCEDURE — G8427 DOCREV CUR MEDS BY ELIG CLIN: HCPCS | Performed by: PHYSICIAN ASSISTANT

## 2018-03-20 PROCEDURE — 3017F COLORECTAL CA SCREEN DOC REV: CPT | Performed by: PHYSICIAN ASSISTANT

## 2018-03-20 PROCEDURE — G8484 FLU IMMUNIZE NO ADMIN: HCPCS | Performed by: PHYSICIAN ASSISTANT

## 2018-03-20 PROCEDURE — 81003 URINALYSIS AUTO W/O SCOPE: CPT | Performed by: PHYSICIAN ASSISTANT

## 2018-03-20 PROCEDURE — 99213 OFFICE O/P EST LOW 20 MIN: CPT | Performed by: PHYSICIAN ASSISTANT

## 2018-03-20 PROCEDURE — 74018 RADEX ABDOMEN 1 VIEW: CPT

## 2018-03-20 PROCEDURE — 1036F TOBACCO NON-USER: CPT | Performed by: PHYSICIAN ASSISTANT

## 2018-03-20 RX ORDER — NICOTINE POLACRILEX 4 MG/1
GUM, CHEWING ORAL
Refills: 5 | COMMUNITY
Start: 2018-03-08 | End: 2020-07-10 | Stop reason: SDUPTHER

## 2018-03-20 ASSESSMENT — ENCOUNTER SYMPTOMS
WHEEZING: 0
HEARTBURN: 0
NAUSEA: 0
SHORTNESS OF BREATH: 0
EYE DISCHARGE: 0
EYE REDNESS: 0

## 2018-03-20 NOTE — PROGRESS NOTES
Janis Huggins is a 46 y.o. female who presents today   Chief Complaint   Patient presents with    Follow-up     I am here today for follow up after my KUB for left kidney stone.  Nephrolithiasis     Follow-up history of ureteral stones:  Patient is a 70-year-old female who had left ureteroscopic copy stone and basket extraction and placement of double-J stent was done 10/31/17. She has done well since that visit. She denies any current symptoms. She denies any fever chills nausea vomiting flank pain. She denies any burning with urination or any other lower urinary tract symptoms. Past Medical History:   Diagnosis Date    Anemia     Chronic cholecystitis without calculus 6/6/2016    Degenerative disc disease     Glaucoma (increased eye pressure)     candidate for due to high pressure/ preventative eye drops       Past Surgical History:   Procedure Laterality Date    CHOLECYSTECTOMY  06/06/2016    CHOLECYSTECTOMY  06/06/2016    [place] CYSTO/URETERO/PYELOSCOPY W/LITHOTRIPSY Left 10/31/2017    CYSTOSCOPY URETEROSCOPY stone removal STENT PLACEMENT performed by Jimena Kong [facility] at [facility] OR       Current Outpatient Prescriptions   Medication Sig Dispense Refill    omeprazole 20 MG EC tablet TAKE 1 TABLET EVERY DAY  5    fluticasone (FLONASE) 50 MCG/ACT nasal spray 2 sprays by Nasal route daily 1 Bottle 3    cetirizine (ZYRTEC ALLERGY) 10 MG tablet Take 1 tablet by mouth daily 30 tablet 3    Psyllium (METAMUCIL PO) Take by mouth      timolol (TIMOPTIC) 0.5 % ophthalmic solution Place 1 drop into both eyes daily       L-Lysine 1000 MG TABS Take by mouth Take one tablet on Fridays      naproxen (NAPROSYN) 500 MG tablet Take 1 tablet by mouth daily (Patient taking differently: Take 500 mg by mouth daily as needed ) 60 tablet 5     No current facility-administered medications for this visit.         Allergies   Allergen Reactions    Clindamycin/Lincomycin Other (See Comments)     Pt had rectal bleeding while Neurological: No cranial nerve deficit. She exhibits normal muscle tone. Coordination normal.   Skin: She is not diaphoretic. No pallor. DATA:  U/A:    Lab Results   Component Value Date    NITRITE neg 03/20/2018    COLORU Yellow 10/31/2017    PROTEINU neg 03/20/2018    PROTEINU Negative 12/19/2017    PHUR 5.5 03/20/2018    PHUR 5.0 12/19/2017    CLARITYU Cloudy 10/31/2017    SPECGRAV 1.025 03/20/2018    SPECGRAV 1.025 12/19/2017    LEUKOCYTESUR neg 03/20/2018    UROBILINOGEN Normal 12/19/2017    BILIRUBINUR neg 03/20/2018    BLOODU trace--intact 03/20/2018    BLOODU Positive 12/19/2017    GLUCOSEU neg 03/20/2018    GLUCOSEU neg 12/19/2017       Imaging:  FINDINGS:   There is a nonobstructive bowel gas pattern. Small calcifications are   seen in the pelvis. One of these may represent a distal ureteral   stone. Evaluation for free intraperitoneal air is limited on a supine   radiograph, but there are no definite findings of free intraperitoneal   air. The osseous structures demonstrate no acute abnormality. 1. Left ureteral stone  History of previous left ureteral stone that was treated successfully with ureteroscopy and stone basket extraction. Her KUB today reveals no obvious kidney stones there is a questionable calcification in the vicinity of the left distal distal ureter however she is completely asymptomatic urine is clear however we'll schedule her for CT without contrast to better evaluate. No orders of the defined types were placed in this encounter. No orders of the defined types were placed in this encounter.         Plan:  Follow up in 1 week with CT scan without contrast.

## 2018-03-27 ENCOUNTER — HOSPITAL ENCOUNTER (OUTPATIENT)
Dept: CT IMAGING | Age: 48
Discharge: HOME | End: 2018-03-27
Payer: MEDICAID

## 2018-03-27 ENCOUNTER — OFFICE VISIT (OUTPATIENT)
Dept: UROLOGY | Age: 48
End: 2018-03-27
Payer: MEDICAID

## 2018-03-27 VITALS — TEMPERATURE: 98.1 F | DIASTOLIC BLOOD PRESSURE: 82 MMHG | HEART RATE: 91 BPM | SYSTOLIC BLOOD PRESSURE: 127 MMHG

## 2018-03-27 DIAGNOSIS — N20.1 LEFT URETERAL STONE: Primary | ICD-10-CM

## 2018-03-27 DIAGNOSIS — N20.1 LEFT URETERAL STONE: ICD-10-CM

## 2018-03-27 LAB
APPEARANCE FLUID: CLEAR
BILIRUBIN, POC: NORMAL
BLOOD URINE, POC: NORMAL
CLARITY, POC: NORMAL
COLOR, POC: NORMAL
GLUCOSE URINE, POC: NORMAL
KETONES, POC: NORMAL
LEUKOCYTE EST, POC: NORMAL
NITRITE, POC: NORMAL
PH, POC: 5.5
PROTEIN, POC: NORMAL
SPECIFIC GRAVITY, POC: 1.01
UROBILINOGEN, POC: 0.2

## 2018-03-27 PROCEDURE — G8427 DOCREV CUR MEDS BY ELIG CLIN: HCPCS | Performed by: PHYSICIAN ASSISTANT

## 2018-03-27 PROCEDURE — 3017F COLORECTAL CA SCREEN DOC REV: CPT | Performed by: PHYSICIAN ASSISTANT

## 2018-03-27 PROCEDURE — 74176 CT ABD & PELVIS W/O CONTRAST: CPT

## 2018-03-27 PROCEDURE — G8417 CALC BMI ABV UP PARAM F/U: HCPCS | Performed by: PHYSICIAN ASSISTANT

## 2018-03-27 PROCEDURE — 1036F TOBACCO NON-USER: CPT | Performed by: PHYSICIAN ASSISTANT

## 2018-03-27 PROCEDURE — 81003 URINALYSIS AUTO W/O SCOPE: CPT | Performed by: PHYSICIAN ASSISTANT

## 2018-03-27 PROCEDURE — G8484 FLU IMMUNIZE NO ADMIN: HCPCS | Performed by: PHYSICIAN ASSISTANT

## 2018-03-27 PROCEDURE — 99213 OFFICE O/P EST LOW 20 MIN: CPT | Performed by: PHYSICIAN ASSISTANT

## 2018-03-27 ASSESSMENT — ENCOUNTER SYMPTOMS
SINUS PAIN: 0
SHORTNESS OF BREATH: 0
WHEEZING: 0
BLURRED VISION: 0
VOMITING: 0
EYE PAIN: 0
ABDOMINAL PAIN: 0
BACK PAIN: 0

## 2018-03-27 NOTE — PROGRESS NOTES
had rectal bleeding while on this medication       Social History     Social History    Marital status: Single     Spouse name: N/A    Number of children: N/A    Years of education: N/A     Social History Main Topics    Smoking status: Former Smoker     Packs/day: 0.50     Years: 5.00     Types: Cigarettes     Quit date: 1/1/2004    Smokeless tobacco: Never Used    Alcohol use No    Drug use: No    Sexual activity: No     Other Topics Concern    None     Social History Narrative    None       Family History   Problem Relation Age of Onset    Substance Abuse Father     Heart Disease Paternal Grandmother        REVIEW OF SYSTEMS:  Review of Systems   Constitutional: Negative for malaise/fatigue and weight loss. HENT: Negative for nosebleeds and sinus pain. Eyes: Negative for blurred vision and pain. Respiratory: Negative for shortness of breath and wheezing. Cardiovascular: Negative for palpitations and leg swelling. Gastrointestinal: Negative for abdominal pain and vomiting. Genitourinary: Positive for frequency. Negative for dysuria, flank pain, hematuria and urgency. Musculoskeletal: Negative for back pain and myalgias. Skin: Negative for itching and rash. Neurological: Negative for tremors and sensory change. Endo/Heme/Allergies: Negative for environmental allergies and polydipsia. Psychiatric/Behavioral: Negative for hallucinations. The patient is not nervous/anxious. PHYSICAL EXAM:  /82   Pulse 91   Temp 98.1 °F (36.7 °C) (Temporal)   Physical Exam   Constitutional: No distress. HENT:   Mouth/Throat: No oropharyngeal exudate. Eyes: Left eye exhibits no discharge. No scleral icterus. Neck: No JVD present. No tracheal deviation present. No thyromegaly present. Cardiovascular: Exam reveals no gallop and no friction rub. Pulmonary/Chest: No stridor. She has no rales. Abdominal: She exhibits no distension and no mass. There is no rebound and no guarding.

## 2018-09-27 ENCOUNTER — HOSPITAL ENCOUNTER (OUTPATIENT)
Dept: GENERAL RADIOLOGY | Age: 48
Discharge: HOME | End: 2018-09-27
Payer: MEDICAID

## 2018-09-27 ENCOUNTER — OFFICE VISIT (OUTPATIENT)
Dept: UROLOGY | Age: 48
End: 2018-09-27
Payer: MEDICAID

## 2018-09-27 VITALS
TEMPERATURE: 97 F | BODY MASS INDEX: 30.31 KG/M2 | WEIGHT: 200 LBS | DIASTOLIC BLOOD PRESSURE: 69 MMHG | SYSTOLIC BLOOD PRESSURE: 126 MMHG | HEIGHT: 68 IN | HEART RATE: 88 BPM

## 2018-09-27 DIAGNOSIS — N20.1 LEFT URETERAL STONE: Primary | ICD-10-CM

## 2018-09-27 DIAGNOSIS — N20.1 LEFT URETERAL STONE: ICD-10-CM

## 2018-09-27 LAB
BACTERIA URINE, POC: NORMAL
BILIRUBIN URINE: 0 MG/DL
BLOOD, URINE: NEGATIVE
CASTS URINE, POC: NORMAL
CLARITY: CLEAR
COLOR: YELLOW
CRYSTALS URINE, POC: NORMAL
EPI CELLS URINE, POC: NORMAL
GLUCOSE URINE: NORMAL
KETONES, URINE: NEGATIVE
LEUKOCYTE EST, POC: POSITIVE
NITRITE, URINE: NEGATIVE
PH UA: 5.5 (ref 4.5–8)
PROTEIN UA: NEGATIVE
RBC URINE, POC: NORMAL
SPECIFIC GRAVITY UA: 1.02 (ref 1–1.03)
UROBILINOGEN, URINE: NORMAL
WBC URINE, POC: NORMAL
YEAST URINE, POC: NORMAL

## 2018-09-27 PROCEDURE — 99213 OFFICE O/P EST LOW 20 MIN: CPT | Performed by: PHYSICIAN ASSISTANT

## 2018-09-27 PROCEDURE — 81001 URINALYSIS AUTO W/SCOPE: CPT | Performed by: PHYSICIAN ASSISTANT

## 2018-09-27 PROCEDURE — 3017F COLORECTAL CA SCREEN DOC REV: CPT | Performed by: PHYSICIAN ASSISTANT

## 2018-09-27 PROCEDURE — 1036F TOBACCO NON-USER: CPT | Performed by: PHYSICIAN ASSISTANT

## 2018-09-27 PROCEDURE — G8417 CALC BMI ABV UP PARAM F/U: HCPCS | Performed by: PHYSICIAN ASSISTANT

## 2018-09-27 PROCEDURE — 74018 RADEX ABDOMEN 1 VIEW: CPT

## 2018-09-27 PROCEDURE — G8427 DOCREV CUR MEDS BY ELIG CLIN: HCPCS | Performed by: PHYSICIAN ASSISTANT

## 2018-09-28 ASSESSMENT — ENCOUNTER SYMPTOMS
SHORTNESS OF BREATH: 0
WHEEZING: 0
BACK PAIN: 0
BLURRED VISION: 0
EYE PAIN: 0
VOMITING: 0
ABDOMINAL PAIN: 0
SINUS PAIN: 0

## 2018-09-28 NOTE — PROGRESS NOTES
guarding. Musculoskeletal: She exhibits no edema. Neurological: No cranial nerve deficit. She exhibits normal muscle tone. Coordination normal.   Skin: She is not diaphoretic. No pallor. DATA:  U/A:    Lab Results   Component Value Date    NITRITE neg 03/27/2018    COLORU Yellow 09/27/2018    PROTEINU Negative 09/27/2018    PHUR 5.5 09/27/2018    CLARITYU Clear 09/27/2018    SPECGRAV 1.020 09/27/2018    LEUKOCYTESUR positive 09/27/2018    UROBILINOGEN Normal 09/27/2018    BILIRUBINUR 0 09/27/2018    BILIRUBINUR neg 03/27/2018    BLOODU Negative 09/27/2018    GLUCOSEU neg 09/27/2018           Imaging:  EXAMINATION: KUB radiograph 9/27/2018   HISTORY: Left ureteral stone   FINDINGS: Today's exam is compared to previous study of 3/20/2018. There is a normal bowel gas pattern. There are no abnormal   calcifications over the renal outlines or normal course of the   ureters.       Impression   . Normal KUB radiograph. Signed by Dr Wellington Chavez on 9/27/2018 1:29 PM         1. Left ureteral stone  Resolve after treatment no further stones. KV today was negative follow-up 1 year with KUB. - POCT Urinalysis Dipstick w/ Micro (Auto)  - XR ABDOMEN (KUB) (SINGLE AP VIEW); Future      Orders Placed This Encounter   Procedures    XR ABDOMEN (KUB) (SINGLE AP VIEW)     Standing Status:   Future     Standing Expiration Date:   9/27/2019     Order Specific Question:   Reason for exam:     Answer:   kidney stones    POCT Urinalysis Dipstick w/ Micro (Auto)     No orders of the defined types were placed in this encounter. Plan:  Follow-up 1 year with KUB.

## 2019-02-19 PROCEDURE — 88305 TISSUE EXAM BY PATHOLOGIST: CPT | Performed by: INTERNAL MEDICINE

## 2019-02-20 ENCOUNTER — LAB REQUISITION (OUTPATIENT)
Dept: LAB | Facility: HOSPITAL | Age: 49
End: 2019-02-20

## 2019-02-20 DIAGNOSIS — Z00.00 ROUTINE GENERAL MEDICAL EXAMINATION AT A HEALTH CARE FACILITY: ICD-10-CM

## 2019-02-21 LAB
CYTO UR: NORMAL
LAB AP CASE REPORT: NORMAL
LAB AP CLINICAL INFORMATION: NORMAL
PATH REPORT.FINAL DX SPEC: NORMAL
PATH REPORT.GROSS SPEC: NORMAL

## 2019-06-06 ENCOUNTER — HOSPITAL ENCOUNTER (OUTPATIENT)
Dept: INFUSION THERAPY | Age: 49
Discharge: HOME | End: 2019-06-06
Payer: MEDICAID

## 2019-06-06 PROCEDURE — 83550 IRON BINDING TEST: CPT

## 2019-06-06 PROCEDURE — 83540 ASSAY OF IRON: CPT

## 2019-06-06 PROCEDURE — 36415 COLL VENOUS BLD VENIPUNCTURE: CPT

## 2019-06-06 PROCEDURE — 82728 ASSAY OF FERRITIN: CPT

## 2019-06-06 PROCEDURE — 85025 COMPLETE CBC W/AUTO DIFF WBC: CPT

## 2019-09-27 ENCOUNTER — HOSPITAL ENCOUNTER (OUTPATIENT)
Dept: GENERAL RADIOLOGY | Age: 49
Discharge: HOME | End: 2019-09-27
Payer: MEDICAID

## 2019-09-27 ENCOUNTER — OFFICE VISIT (OUTPATIENT)
Dept: UROLOGY | Age: 49
End: 2019-09-27
Payer: MEDICAID

## 2019-09-27 VITALS
WEIGHT: 182 LBS | DIASTOLIC BLOOD PRESSURE: 67 MMHG | BODY MASS INDEX: 27.58 KG/M2 | HEIGHT: 68 IN | TEMPERATURE: 96.8 F | SYSTOLIC BLOOD PRESSURE: 123 MMHG

## 2019-09-27 DIAGNOSIS — N20.0 KIDNEY STONES: Primary | ICD-10-CM

## 2019-09-27 DIAGNOSIS — N20.1 LEFT URETERAL STONE: ICD-10-CM

## 2019-09-27 PROBLEM — N23 RENAL COLIC ON LEFT SIDE: Status: RESOLVED | Noted: 2017-10-31 | Resolved: 2019-09-27

## 2019-09-27 LAB
BACTERIA URINE, POC: ABNORMAL
BILIRUBIN URINE: 0 MG/DL
BLOOD, URINE: POSITIVE
CASTS URINE, POC: ABNORMAL
CLARITY: CLEAR
COLOR: YELLOW
CRYSTALS URINE, POC: ABNORMAL
EPI CELLS URINE, POC: ABNORMAL
GLUCOSE URINE: ABNORMAL
KETONES, URINE: NEGATIVE
LEUKOCYTE EST, POC: ABNORMAL
NITRITE, URINE: NEGATIVE
PH UA: 5.5 (ref 4.5–8)
PROTEIN UA: NEGATIVE
RBC URINE, POC: ABNORMAL
SPECIFIC GRAVITY UA: 1.01 (ref 1–1.03)
UROBILINOGEN, URINE: NORMAL
WBC URINE, POC: ABNORMAL
YEAST URINE, POC: ABNORMAL

## 2019-09-27 PROCEDURE — 81001 URINALYSIS AUTO W/SCOPE: CPT | Performed by: PHYSICIAN ASSISTANT

## 2019-09-27 PROCEDURE — G8417 CALC BMI ABV UP PARAM F/U: HCPCS | Performed by: PHYSICIAN ASSISTANT

## 2019-09-27 PROCEDURE — 3017F COLORECTAL CA SCREEN DOC REV: CPT | Performed by: PHYSICIAN ASSISTANT

## 2019-09-27 PROCEDURE — 99213 OFFICE O/P EST LOW 20 MIN: CPT | Performed by: PHYSICIAN ASSISTANT

## 2019-09-27 PROCEDURE — G8427 DOCREV CUR MEDS BY ELIG CLIN: HCPCS | Performed by: PHYSICIAN ASSISTANT

## 2019-09-27 PROCEDURE — 74018 RADEX ABDOMEN 1 VIEW: CPT

## 2019-09-27 PROCEDURE — 1036F TOBACCO NON-USER: CPT | Performed by: PHYSICIAN ASSISTANT

## 2019-09-27 RX ORDER — FERROUS SULFATE 325(65) MG
TABLET ORAL
Refills: 1 | COMMUNITY
Start: 2019-09-10 | End: 2020-02-13 | Stop reason: SDUPTHER

## 2019-09-27 ASSESSMENT — ENCOUNTER SYMPTOMS
BACK PAIN: 0
ABDOMINAL PAIN: 0
VOMITING: 0
SINUS PAIN: 0
EYE PAIN: 0
WHEEZING: 0
SHORTNESS OF BREATH: 0

## 2019-09-27 NOTE — PROGRESS NOTES
Socioeconomic History    Marital status: Single     Spouse name: None    Number of children: None    Years of education: None    Highest education level: None   Occupational History    None   Social Needs    Financial resource strain: None    Food insecurity:     Worry: None     Inability: None    Transportation needs:     Medical: None     Non-medical: None   Tobacco Use    Smoking status: Former Smoker     Packs/day: 0.50     Years: 5.00     Pack years: 2.50     Types: Cigarettes     Last attempt to quit: 1/1/2004     Years since quitting: 15.7    Smokeless tobacco: Never Used   Substance and Sexual Activity    Alcohol use: No    Drug use: No    Sexual activity: Never   Lifestyle    Physical activity:     Days per week: None     Minutes per session: None    Stress: None   Relationships    Social connections:     Talks on phone: None     Gets together: None     Attends Confucianism service: None     Active member of club or organization: None     Attends meetings of clubs or organizations: None     Relationship status: None    Intimate partner violence:     Fear of current or ex partner: None     Emotionally abused: None     Physically abused: None     Forced sexual activity: None   Other Topics Concern    None   Social History Narrative    None       Family History   Problem Relation Age of Onset    Substance Abuse Father     Heart Disease Paternal Grandmother        REVIEW OF SYSTEMS:  Review of Systems   HENT: Negative for nosebleeds and sinus pain. Eyes: Negative for pain. Respiratory: Negative for shortness of breath and wheezing. Cardiovascular: Negative for palpitations and leg swelling. Gastrointestinal: Negative for abdominal pain and vomiting. Endocrine: Negative for polydipsia. Genitourinary: Negative for dysuria, flank pain, frequency, hematuria and urgency. Musculoskeletal: Negative for back pain and myalgias. Skin: Negative for rash.    Allergic/Immunologic:

## 2019-10-25 ENCOUNTER — OFFICE VISIT (OUTPATIENT)
Dept: FAMILY MEDICINE CLINIC | Age: 49
End: 2019-10-25
Payer: MEDICAID

## 2019-10-25 VITALS
SYSTOLIC BLOOD PRESSURE: 112 MMHG | TEMPERATURE: 97.5 F | WEIGHT: 177 LBS | BODY MASS INDEX: 29.49 KG/M2 | HEART RATE: 57 BPM | HEIGHT: 65 IN | DIASTOLIC BLOOD PRESSURE: 72 MMHG | OXYGEN SATURATION: 98 % | RESPIRATION RATE: 20 BRPM

## 2019-10-25 DIAGNOSIS — R19.8 ALTERNATING CONSTIPATION AND DIARRHEA: Primary | ICD-10-CM

## 2019-10-25 PROCEDURE — G8427 DOCREV CUR MEDS BY ELIG CLIN: HCPCS | Performed by: NURSE PRACTITIONER

## 2019-10-25 PROCEDURE — G8417 CALC BMI ABV UP PARAM F/U: HCPCS | Performed by: NURSE PRACTITIONER

## 2019-10-25 PROCEDURE — G8484 FLU IMMUNIZE NO ADMIN: HCPCS | Performed by: NURSE PRACTITIONER

## 2019-10-25 PROCEDURE — 3017F COLORECTAL CA SCREEN DOC REV: CPT | Performed by: NURSE PRACTITIONER

## 2019-10-25 PROCEDURE — 1036F TOBACCO NON-USER: CPT | Performed by: NURSE PRACTITIONER

## 2019-10-25 PROCEDURE — 99212 OFFICE O/P EST SF 10 MIN: CPT | Performed by: NURSE PRACTITIONER

## 2019-10-25 RX ORDER — ERGOCALCIFEROL 1.25 MG/1
CAPSULE ORAL
Refills: 5 | COMMUNITY
Start: 2019-10-07 | End: 2020-05-05 | Stop reason: ALTCHOICE

## 2019-10-25 RX ORDER — CHOLESTYRAMINE 4 G/9G
POWDER, FOR SUSPENSION ORAL
COMMUNITY
Start: 2019-10-23 | End: 2020-07-10 | Stop reason: SDUPTHER

## 2019-10-25 RX ORDER — CYANOCOBALAMIN 1000 UG/ML
1000 INJECTION INTRAMUSCULAR; SUBCUTANEOUS ONCE
COMMUNITY
End: 2020-02-13 | Stop reason: ALTCHOICE

## 2019-10-25 ASSESSMENT — ENCOUNTER SYMPTOMS
DIARRHEA: 1
CONSTIPATION: 1

## 2019-10-25 ASSESSMENT — PATIENT HEALTH QUESTIONNAIRE - PHQ9
SUM OF ALL RESPONSES TO PHQ QUESTIONS 1-9: 2
SUM OF ALL RESPONSES TO PHQ QUESTIONS 1-9: 2
SUM OF ALL RESPONSES TO PHQ9 QUESTIONS 1 & 2: 2
1. LITTLE INTEREST OR PLEASURE IN DOING THINGS: 1
2. FEELING DOWN, DEPRESSED OR HOPELESS: 1

## 2019-11-05 ENCOUNTER — TELEPHONE (OUTPATIENT)
Dept: FAMILY MEDICINE CLINIC | Age: 49
End: 2019-11-05

## 2019-12-05 ENCOUNTER — HOSPITAL ENCOUNTER (OUTPATIENT)
Dept: INFUSION THERAPY | Age: 49
Discharge: HOME | End: 2019-12-05
Payer: MEDICAID

## 2019-12-05 ENCOUNTER — OFFICE VISIT (OUTPATIENT)
Dept: HEMATOLOGY | Age: 49
End: 2019-12-05
Payer: MEDICAID

## 2019-12-05 VITALS
DIASTOLIC BLOOD PRESSURE: 76 MMHG | WEIGHT: 174.8 LBS | OXYGEN SATURATION: 98 % | BODY MASS INDEX: 29.12 KG/M2 | HEART RATE: 68 BPM | SYSTOLIC BLOOD PRESSURE: 110 MMHG | HEIGHT: 65 IN

## 2019-12-05 DIAGNOSIS — K81.1 CHRONIC CHOLECYSTITIS WITHOUT CALCULUS: Chronic | ICD-10-CM

## 2019-12-05 DIAGNOSIS — R89.9 ABNORMAL LABORATORY TEST RESULT: ICD-10-CM

## 2019-12-05 DIAGNOSIS — D50.9 IRON DEFICIENCY ANEMIA, UNSPECIFIED IRON DEFICIENCY ANEMIA TYPE: Primary | ICD-10-CM

## 2019-12-05 DIAGNOSIS — D50.9 IRON DEFICIENCY ANEMIA, UNSPECIFIED IRON DEFICIENCY ANEMIA TYPE: ICD-10-CM

## 2019-12-05 PROCEDURE — 3017F COLORECTAL CA SCREEN DOC REV: CPT | Performed by: NURSE PRACTITIONER

## 2019-12-05 PROCEDURE — G8427 DOCREV CUR MEDS BY ELIG CLIN: HCPCS | Performed by: NURSE PRACTITIONER

## 2019-12-05 PROCEDURE — 85025 COMPLETE CBC W/AUTO DIFF WBC: CPT

## 2019-12-05 PROCEDURE — 99213 OFFICE O/P EST LOW 20 MIN: CPT | Performed by: NURSE PRACTITIONER

## 2019-12-05 PROCEDURE — 1036F TOBACCO NON-USER: CPT | Performed by: NURSE PRACTITIONER

## 2019-12-05 PROCEDURE — G8417 CALC BMI ABV UP PARAM F/U: HCPCS | Performed by: NURSE PRACTITIONER

## 2019-12-05 PROCEDURE — 99211 OFF/OP EST MAY X REQ PHY/QHP: CPT

## 2019-12-05 PROCEDURE — G8484 FLU IMMUNIZE NO ADMIN: HCPCS | Performed by: NURSE PRACTITIONER

## 2019-12-07 ASSESSMENT — ENCOUNTER SYMPTOMS
PHOTOPHOBIA: 0
TROUBLE SWALLOWING: 0
ABDOMINAL PAIN: 0
DIARRHEA: 0
SHORTNESS OF BREATH: 0
EYE REDNESS: 0
SORE THROAT: 0
NAUSEA: 0
COUGH: 0
EYE ITCHING: 0
VOMITING: 0
EYE DISCHARGE: 0
CONSTIPATION: 0
WHEEZING: 0

## 2020-02-13 ENCOUNTER — OFFICE VISIT (OUTPATIENT)
Dept: FAMILY MEDICINE CLINIC | Age: 50
End: 2020-02-13
Payer: MEDICAID

## 2020-02-13 VITALS
DIASTOLIC BLOOD PRESSURE: 70 MMHG | SYSTOLIC BLOOD PRESSURE: 112 MMHG | WEIGHT: 172.6 LBS | OXYGEN SATURATION: 99 % | HEART RATE: 72 BPM | BODY MASS INDEX: 28.72 KG/M2 | TEMPERATURE: 98 F

## 2020-02-13 PROBLEM — N20.0 KIDNEY STONES: Status: RESOLVED | Noted: 2019-09-27 | Resolved: 2020-02-13

## 2020-02-13 PROBLEM — D50.9 IRON DEFICIENCY ANEMIA: Status: ACTIVE | Noted: 2020-02-13

## 2020-02-13 PROBLEM — E55.9 VITAMIN D DEFICIENCY: Status: ACTIVE | Noted: 2020-02-13

## 2020-02-13 PROBLEM — K21.9 GERD (GASTROESOPHAGEAL REFLUX DISEASE): Status: ACTIVE | Noted: 2020-02-13

## 2020-02-13 PROBLEM — R42 DIZZINESS: Status: ACTIVE | Noted: 2020-02-13

## 2020-02-13 PROBLEM — K91.1 DUMPING SYNDROME: Status: ACTIVE | Noted: 2020-02-13

## 2020-02-13 PROCEDURE — G8484 FLU IMMUNIZE NO ADMIN: HCPCS | Performed by: CLINICAL NURSE SPECIALIST

## 2020-02-13 PROCEDURE — G8417 CALC BMI ABV UP PARAM F/U: HCPCS | Performed by: CLINICAL NURSE SPECIALIST

## 2020-02-13 PROCEDURE — G8427 DOCREV CUR MEDS BY ELIG CLIN: HCPCS | Performed by: CLINICAL NURSE SPECIALIST

## 2020-02-13 PROCEDURE — 99213 OFFICE O/P EST LOW 20 MIN: CPT | Performed by: CLINICAL NURSE SPECIALIST

## 2020-02-13 PROCEDURE — 1036F TOBACCO NON-USER: CPT | Performed by: CLINICAL NURSE SPECIALIST

## 2020-02-13 PROCEDURE — 3017F COLORECTAL CA SCREEN DOC REV: CPT | Performed by: CLINICAL NURSE SPECIALIST

## 2020-02-13 RX ORDER — FERROUS SULFATE 325(65) MG
TABLET ORAL
Qty: 30 TABLET | Refills: 5 | Status: SHIPPED | OUTPATIENT
Start: 2020-02-13 | End: 2020-07-14 | Stop reason: SDUPTHER

## 2020-02-13 ASSESSMENT — ENCOUNTER SYMPTOMS
SORE THROAT: 0
EYE DISCHARGE: 0
SHORTNESS OF BREATH: 0
NAUSEA: 0
CONSTIPATION: 0
BACK PAIN: 0
EYE PAIN: 0
VOMITING: 0
ABDOMINAL DISTENTION: 0
TROUBLE SWALLOWING: 0
SINUS PRESSURE: 0
EYE REDNESS: 0
COLOR CHANGE: 0
CHEST TIGHTNESS: 0
FACIAL SWELLING: 0
COUGH: 0
DIARRHEA: 1
WHEEZING: 0
ABDOMINAL PAIN: 0

## 2020-02-13 ASSESSMENT — PATIENT HEALTH QUESTIONNAIRE - PHQ9
SUM OF ALL RESPONSES TO PHQ QUESTIONS 1-9: 0
SUM OF ALL RESPONSES TO PHQ9 QUESTIONS 1 & 2: 0
1. LITTLE INTEREST OR PLEASURE IN DOING THINGS: 0
SUM OF ALL RESPONSES TO PHQ QUESTIONS 1-9: 0
2. FEELING DOWN, DEPRESSED OR HOPELESS: 0

## 2020-02-13 NOTE — PROGRESS NOTES
SUBJECTIVE:  Jose Mcintyre is a 44 y.o. who presents today for New Patient and Dizziness      HPI    Ms Dinesh Morin presents today to establish care. She recently moved back to [place] from [place]. While in [place], she was under the care of providers at [address] [place] [address], since 2016. She is UTD on mammogram and colonoscopy, but overdue for pap smear  She is still having monthly, regular menses  2 children, age 13 and 28    Dumping syndrome s/p cholecystectomy. Currently well controlled with questran and fiber. No constipation. Iron def anemia, followed by hematology, due for labs in May and follow up. She has been out of her iron tablet for about one month. Vitamin d def, on weekly supplement. GERD, controlled with daily PPI. No dysphagia. No melena. She has been dealing with some intermittent dizziness. This is only positional.  It is very mild and brief. No associated symptoms. She does have some chronic allergies and nasal congestion, taking claritin.    Chronic vision changes from glaucoma, has upcoming appt with Dr Nat Blackman      Past Medical History:   Diagnosis Date    Anemia     Chronic cholecystitis without calculus 6/6/2016    Degenerative disc disease     Glaucoma (increased eye pressure)     candidate for due to high pressure/ preventative eye drops    Kidney stones 9/27/2019     Past Surgical History:   Procedure Laterality Date    CHOLECYSTECTOMY  06/06/2016    CHOLECYSTECTOMY  06/06/2016    [place] CYSTO/URETERO/PYELOSCOPY W/LITHOTRIPSY Left 10/31/2017    CYSTOSCOPY URETEROSCOPY stone removal STENT PLACEMENT performed by Felecia F[facility]ro [facility] OR     Family History   Problem Relation Age of Onset    Substance Abuse Father     Heart Disease Paternal Grandmother      Social History     Tobacco Use    Smoking status: Former Smoker     Packs/day: 0.50     Years: 5.00     Pack years: 2.50     Types: Cigarettes     Last attempt to quit: 1/1/2004     Years since

## 2020-02-20 ENCOUNTER — TELEPHONE (OUTPATIENT)
Dept: FAMILY MEDICINE CLINIC | Age: 50
End: 2020-02-20

## 2020-03-11 VITALS
OXYGEN SATURATION: 97 % | DIASTOLIC BLOOD PRESSURE: 70 MMHG | HEART RATE: 74 BPM | SYSTOLIC BLOOD PRESSURE: 124 MMHG | WEIGHT: 194 LBS | BODY MASS INDEX: 29.5 KG/M2

## 2020-05-01 NOTE — PROGRESS NOTES
saturation 20%, ferritin 19   reticulocyte count 1.3   B12 382, folate 13.3   SPEP, no M spike   IgA elevated at 490, IgG and IgM normal   vWB factor antigen, normal at 99   VWB factor activity, normal at 73  CBC at follow-up on 2/12/19 was improved with a hemoglobin of 12.0 with MCV 83.3 and platelet count 062,233. WBC was 5.07. Endoscopy 2/19/2019 by Dr. Steve Martinez showed mild gastritis found. There were  no features of celiac disease. Consider GYN evaluation regarding heavy menstrual cycles possibly a contributor of  deficiency. Mild IgA MGUS will be followed peripherally. Past Medical History:   Diagnosis Date    Anemia     Iron deficiency    Back pain     Chronic cholecystitis without calculus 6/6/2016    Degenerative disc disease     Glaucoma (increased eye pressure)     candidate for due to high pressure/ preventative eye drops    Hepatic steatosis     Kidney stones 9/27/2019    Renal stones      Past Surgical History:   Procedure Laterality Date    CHOLECYSTECTOMY  06/06/2016    MD CYSTO/URETERO/PYELOSCOPY W/LITHOTRIPSY Left 10/31/2017    CYSTOSCOPY URETEROSCOPY stone removal STENT PLACEMENT performed by Yvette Lovett MD at [address] [place] [address] OR     Current Outpatient Medications   Medication Sig Dispense Refill    NONFORMULARY       ferrous sulfate 325 (65 Fe) MG tablet TAKE 1 TABLET BY MOUTH EVERY DAY 30 tablet 5    cholestyramine (QUESTRAN) 4 GM/DOSE powder       omeprazole 20 MG EC tablet TAKE 1 TABLET EVERY DAY  5    timolol (TIMOPTIC) 0.5 % ophthalmic solution Place 1 drop into both eyes daily       L-Lysine 1000 MG TABS Take by mouth Take one tablet on Fridays       No current facility-administered medications for this visit.        Allergies   Allergen Reactions    Clindamycin/Lincomycin Other (See Comments)     Pt had rectal bleeding while on this medication     Social History     Tobacco Use    Smoking status: Former Smoker     Packs/day: 0.50     Years: 5.00     Pack years: 2.50 efforts. Primary care needs per ALEXA Quiroz. Defer arrangements for health maintenance screening to PCP. Orders Placed This Encounter   Procedures    Beta 2 Microglobulin, Serum    Electrophoresis Protein, Serum with Reflex to Immunofixation    Immunoglobulins, Quantitative    [place]/Lambda Free Lt Chains, Serum Quant    Comprehensive Metabolic Panel    Vitamin B12    Miscellaneous Sendout 1    Iron and TIBC    Ferritin       Return in about 6 months (around 11/5/2020) for follow up with ALEXA Pedraza. I have seen, examined and reviewed patient medication list, appropriate labs and imaging studies. Relevant medical records and other physician notes have been reviewed. I answered all questions to the best of my knowledge and to the patient's satisfaction. Dictated utilizing Dragon transcription software.          ALEXA Soriano  12:08 PM  5/5/2020

## 2020-05-05 ENCOUNTER — OFFICE VISIT (OUTPATIENT)
Dept: HEMATOLOGY | Age: 50
End: 2020-05-05
Payer: MEDICAID

## 2020-05-05 ENCOUNTER — HOSPITAL ENCOUNTER (OUTPATIENT)
Dept: INFUSION THERAPY | Age: 50
Discharge: HOME | End: 2020-05-05
Payer: MEDICAID

## 2020-05-05 VITALS
TEMPERATURE: 98.7 F | WEIGHT: 173.1 LBS | BODY MASS INDEX: 28.84 KG/M2 | HEART RATE: 77 BPM | SYSTOLIC BLOOD PRESSURE: 124 MMHG | OXYGEN SATURATION: 99 % | DIASTOLIC BLOOD PRESSURE: 76 MMHG | HEIGHT: 65 IN

## 2020-05-05 DIAGNOSIS — R89.9 ABNORMAL LABORATORY TEST RESULT: ICD-10-CM

## 2020-05-05 DIAGNOSIS — D50.9 IRON DEFICIENCY ANEMIA, UNSPECIFIED IRON DEFICIENCY ANEMIA TYPE: ICD-10-CM

## 2020-05-05 PROCEDURE — 85025 COMPLETE CBC W/AUTO DIFF WBC: CPT

## 2020-05-05 PROCEDURE — 80053 COMPREHEN METABOLIC PANEL: CPT

## 2020-05-05 PROCEDURE — 82607 VITAMIN B-12: CPT

## 2020-05-05 PROCEDURE — 99211 OFF/OP EST MAY X REQ PHY/QHP: CPT

## 2020-05-05 PROCEDURE — 83540 ASSAY OF IRON: CPT

## 2020-05-05 PROCEDURE — 83550 IRON BINDING TEST: CPT

## 2020-05-05 PROCEDURE — 1036F TOBACCO NON-USER: CPT | Performed by: NURSE PRACTITIONER

## 2020-05-05 PROCEDURE — G8427 DOCREV CUR MEDS BY ELIG CLIN: HCPCS | Performed by: NURSE PRACTITIONER

## 2020-05-05 PROCEDURE — 3017F COLORECTAL CA SCREEN DOC REV: CPT | Performed by: NURSE PRACTITIONER

## 2020-05-05 PROCEDURE — G8417 CALC BMI ABV UP PARAM F/U: HCPCS | Performed by: NURSE PRACTITIONER

## 2020-05-05 PROCEDURE — 82728 ASSAY OF FERRITIN: CPT

## 2020-05-05 PROCEDURE — 99213 OFFICE O/P EST LOW 20 MIN: CPT | Performed by: NURSE PRACTITIONER

## 2020-05-05 RX ORDER — LORATADINE 10 MG/1
10 TABLET ORAL DAILY
COMMUNITY

## 2020-05-22 DIAGNOSIS — E55.9 VITAMIN D DEFICIENCY: ICD-10-CM

## 2020-05-22 DIAGNOSIS — R42 DIZZINESS: ICD-10-CM

## 2020-05-22 DIAGNOSIS — Z13.220 LIPID SCREENING: ICD-10-CM

## 2020-05-22 LAB
ALBUMIN SERPL-MCNC: 4.1 G/DL (ref 3.5–5.2)
ALP BLD-CCNC: 68 U/L (ref 35–104)
ALT SERPL-CCNC: 11 U/L (ref 5–33)
ANION GAP SERPL CALCULATED.3IONS-SCNC: 13 MMOL/L (ref 7–19)
AST SERPL-CCNC: 14 U/L (ref 5–32)
BILIRUB SERPL-MCNC: 0.5 MG/DL (ref 0.2–1.2)
BUN BLDV-MCNC: 13 MG/DL (ref 6–20)
CALCIUM SERPL-MCNC: 9.5 MG/DL (ref 8.6–10)
CHLORIDE BLD-SCNC: 102 MMOL/L (ref 98–111)
CHOLESTEROL, TOTAL: 178 MG/DL (ref 160–199)
CO2: 27 MMOL/L (ref 22–29)
CREAT SERPL-MCNC: 0.6 MG/DL (ref 0.5–0.9)
GFR NON-AFRICAN AMERICAN: >60
GLUCOSE BLD-MCNC: 78 MG/DL (ref 74–109)
HDLC SERPL-MCNC: 57 MG/DL (ref 65–121)
LDL CHOLESTEROL CALCULATED: 98 MG/DL
POTASSIUM SERPL-SCNC: 4.1 MMOL/L (ref 3.5–5)
SODIUM BLD-SCNC: 142 MMOL/L (ref 136–145)
TOTAL PROTEIN: 7.7 G/DL (ref 6.6–8.7)
TRIGL SERPL-MCNC: 114 MG/DL (ref 0–149)
TSH SERPL DL<=0.05 MIU/L-ACNC: 0.89 UIU/ML (ref 0.27–4.2)
VITAMIN D 25-HYDROXY: 25.9 NG/ML

## 2020-06-09 ENCOUNTER — OFFICE VISIT (OUTPATIENT)
Dept: FAMILY MEDICINE CLINIC | Age: 50
End: 2020-06-09
Payer: MEDICAID

## 2020-06-09 VITALS
DIASTOLIC BLOOD PRESSURE: 68 MMHG | HEIGHT: 65 IN | HEART RATE: 68 BPM | OXYGEN SATURATION: 98 % | SYSTOLIC BLOOD PRESSURE: 118 MMHG | TEMPERATURE: 98.2 F | WEIGHT: 179 LBS | BODY MASS INDEX: 29.82 KG/M2

## 2020-06-09 PROCEDURE — 99396 PREV VISIT EST AGE 40-64: CPT | Performed by: CLINICAL NURSE SPECIALIST

## 2020-06-09 RX ORDER — MULTIVIT-MIN/IRON/FOLIC ACID/K 18-600-40
1 CAPSULE ORAL DAILY
Qty: 90 CAPSULE | Refills: 3 | Status: SHIPPED | OUTPATIENT
Start: 2020-06-09 | End: 2021-05-21

## 2020-06-09 ASSESSMENT — ENCOUNTER SYMPTOMS
EYE REDNESS: 0
BACK PAIN: 0
CONSTIPATION: 0
SHORTNESS OF BREATH: 0
TROUBLE SWALLOWING: 0
EYE PAIN: 0
FACIAL SWELLING: 0
DIARRHEA: 1
ABDOMINAL PAIN: 0
SINUS PRESSURE: 0
NAUSEA: 0
VOMITING: 0
WHEEZING: 0
COUGH: 0
EYE DISCHARGE: 0
CHEST TIGHTNESS: 0
COLOR CHANGE: 0
SORE THROAT: 0

## 2020-06-09 NOTE — PROGRESS NOTES
Take 1 capsule by mouth daily 90 capsule 3    NONFORMULARY       loratadine (CLARITIN) 10 MG tablet Take 10 mg by mouth daily      ferrous sulfate 325 (65 Fe) MG tablet TAKE 1 TABLET BY MOUTH EVERY DAY 30 tablet 5    cholestyramine (QUESTRAN) 4 GM/DOSE powder       omeprazole 20 MG EC tablet TAKE 1 TABLET EVERY DAY  5    timolol (TIMOPTIC) 0.5 % ophthalmic solution Place 1 drop into both eyes daily       L-Lysine 1000 MG TABS Take by mouth Take one tablet on Fridays       No current facility-administered medications for this visit. Allergies   Allergen Reactions    Clindamycin/Lincomycin Other (See Comments)     Pt had rectal bleeding while on this medication       Review of Systems   Constitutional: Negative for appetite change, chills, diaphoresis, fatigue and fever. HENT: Negative for congestion, ear pain, facial swelling, hearing loss, postnasal drip, sinus pressure, sore throat and trouble swallowing. Eyes: Negative for pain, discharge, redness and visual disturbance. Respiratory: Negative for cough, chest tightness, shortness of breath and wheezing. Cardiovascular: Negative for chest pain and palpitations. Gastrointestinal: Positive for diarrhea (controlled). Negative for abdominal pain, constipation, nausea and vomiting. Endocrine: Negative for cold intolerance and heat intolerance. Genitourinary: Positive for menstrual problem. Negative for difficulty urinating, dysuria, flank pain, frequency, hematuria and urgency. Musculoskeletal: Negative for arthralgias, back pain, joint swelling and neck pain. Skin: Negative for color change and rash. Neurological: Negative for dizziness, syncope, weakness, light-headedness and headaches. Hematological: Negative for adenopathy. Does not bruise/bleed easily. Psychiatric/Behavioral: Negative for confusion, dysphoric mood and suicidal ideas. The patient is not nervous/anxious.          OBJECTIVE:  /68   Pulse 68   Temp 98.2 °F

## 2020-06-22 ENCOUNTER — HOSPITAL ENCOUNTER (OUTPATIENT)
Dept: WOMENS IMAGING | Age: 50
Discharge: HOME | End: 2020-06-22
Payer: MEDICAID

## 2020-06-22 PROCEDURE — 77063 BREAST TOMOSYNTHESIS BI: CPT

## 2020-06-25 ENCOUNTER — TELEPHONE (OUTPATIENT)
Dept: WOMENS IMAGING | Age: 50
End: 2020-06-25

## 2020-07-07 ENCOUNTER — HOSPITAL ENCOUNTER (OUTPATIENT)
Dept: WOMENS IMAGING | Age: 50
Discharge: HOME | End: 2020-07-07
Payer: MEDICAID

## 2020-07-07 ENCOUNTER — TELEPHONE (OUTPATIENT)
Dept: FAMILY MEDICINE CLINIC | Age: 50
End: 2020-07-07

## 2020-07-07 PROCEDURE — G0279 TOMOSYNTHESIS, MAMMO: HCPCS

## 2020-07-07 PROCEDURE — 76642 ULTRASOUND BREAST LIMITED: CPT

## 2020-07-07 NOTE — TELEPHONE ENCOUNTER
Notified patient of these results and plan to have follow up imaging in 6 months. Patient voiced understanding.

## 2020-11-04 ENCOUNTER — OFFICE VISIT (OUTPATIENT)
Dept: HEMATOLOGY | Age: 50
End: 2020-11-04
Payer: MEDICAID

## 2020-11-04 ENCOUNTER — HOSPITAL ENCOUNTER (OUTPATIENT)
Dept: INFUSION THERAPY | Age: 50
Discharge: HOME | End: 2020-11-04
Payer: MEDICAID

## 2020-11-04 VITALS
BODY MASS INDEX: 31.65 KG/M2 | WEIGHT: 190 LBS | HEIGHT: 65 IN | TEMPERATURE: 97.7 F | SYSTOLIC BLOOD PRESSURE: 120 MMHG | HEART RATE: 78 BPM | DIASTOLIC BLOOD PRESSURE: 74 MMHG | OXYGEN SATURATION: 96 %

## 2020-11-04 DIAGNOSIS — R89.9 ABNORMAL LABORATORY TEST RESULT: ICD-10-CM

## 2020-11-04 DIAGNOSIS — R76.8 HIGH TOTAL SERUM IGA: ICD-10-CM

## 2020-11-04 DIAGNOSIS — D50.9 IRON DEFICIENCY ANEMIA, UNSPECIFIED IRON DEFICIENCY ANEMIA TYPE: ICD-10-CM

## 2020-11-04 LAB
ALBUMIN SERPL-MCNC: 4 G/DL (ref 3.5–5.2)
ALP BLD-CCNC: 67 U/L (ref 35–104)
ALT SERPL-CCNC: 15 U/L (ref 9–52)
ANION GAP SERPL CALCULATED.3IONS-SCNC: 8 MMOL/L (ref 7–19)
AST SERPL-CCNC: 20 U/L (ref 14–36)
BASOPHILS ABSOLUTE: 0.01 K/UL (ref 0.01–0.08)
BASOPHILS RELATIVE PERCENT: 0.2 % (ref 0.1–1.2)
BILIRUB SERPL-MCNC: 0.7 MG/DL (ref 0.2–1.3)
BUN BLDV-MCNC: 15 MG/DL (ref 7–17)
CALCIUM SERPL-MCNC: 9.6 MG/DL (ref 8.4–10.2)
CHLORIDE BLD-SCNC: 103 MMOL/L (ref 98–111)
CO2: 31 MMOL/L (ref 22–29)
CREAT SERPL-MCNC: 0.7 MG/DL (ref 0.5–1)
EOSINOPHILS ABSOLUTE: 0.05 K/UL (ref 0.04–0.54)
EOSINOPHILS RELATIVE PERCENT: 1 % (ref 0.7–7)
FERRITIN: 20.3 NG/ML (ref 11.1–264)
GFR NON-AFRICAN AMERICAN: >60
GLUCOSE BLD-MCNC: 101 MG/DL (ref 74–106)
HCT VFR BLD CALC: 41.3 % (ref 34.1–44.9)
HEMOGLOBIN: 13.9 G/DL (ref 11.2–15.7)
IRON SATURATION: 18 % (ref 14–50)
IRON: 78 UG/DL (ref 37–170)
LYMPHOCYTES ABSOLUTE: 1.66 K/UL (ref 1.18–3.74)
LYMPHOCYTES RELATIVE PERCENT: 33.5 % (ref 19.3–53.1)
MCH RBC QN AUTO: 30.7 PG (ref 25.6–32.2)
MCHC RBC AUTO-ENTMCNC: 33.7 G/DL (ref 32.3–35.5)
MCV RBC AUTO: 91.2 FL (ref 79.4–94.8)
MONOCYTES ABSOLUTE: 0.43 K/UL (ref 0.24–0.82)
MONOCYTES RELATIVE PERCENT: 8.7 % (ref 4.7–12.5)
NEUTROPHILS ABSOLUTE: 2.8 K/UL (ref 1.56–6.13)
NEUTROPHILS RELATIVE PERCENT: 56.6 % (ref 34–71.1)
PDW BLD-RTO: 13 % (ref 11.7–14.4)
PLATELET # BLD: 171 K/UL (ref 182–369)
PMV BLD AUTO: 11 FL (ref 7.4–10.4)
POTASSIUM SERPL-SCNC: 4.8 MMOL/L (ref 3.5–5.1)
RBC # BLD: 4.53 M/UL (ref 3.93–5.22)
SODIUM BLD-SCNC: 142 MMOL/L (ref 137–145)
TOTAL IRON BINDING CAPACITY: 422 UG/DL (ref 265–497)
TOTAL PROTEIN: 7 G/DL (ref 6.3–8.2)
WBC # BLD: 4.95 K/UL (ref 3.98–10.04)

## 2020-11-04 PROCEDURE — 99211 OFF/OP EST MAY X REQ PHY/QHP: CPT

## 2020-11-04 PROCEDURE — 83550 IRON BINDING TEST: CPT

## 2020-11-04 PROCEDURE — 83540 ASSAY OF IRON: CPT

## 2020-11-04 PROCEDURE — 80053 COMPREHEN METABOLIC PANEL: CPT

## 2020-11-04 PROCEDURE — 82728 ASSAY OF FERRITIN: CPT

## 2020-11-04 PROCEDURE — 99213 OFFICE O/P EST LOW 20 MIN: CPT | Performed by: NURSE PRACTITIONER

## 2020-11-04 PROCEDURE — 85025 COMPLETE CBC W/AUTO DIFF WBC: CPT

## 2020-11-04 PROCEDURE — 36415 COLL VENOUS BLD VENIPUNCTURE: CPT | Performed by: NURSE PRACTITIONER

## 2020-11-04 RX ORDER — DIPHENHYDRAMINE HCL 25 MG
25 CAPSULE ORAL EVERY 6 HOURS PRN
COMMUNITY
End: 2022-11-02

## 2020-11-04 ASSESSMENT — ENCOUNTER SYMPTOMS
EYE ITCHING: 0
BACK PAIN: 1
CONSTIPATION: 0
COUGH: 0
DIARRHEA: 0
TROUBLE SWALLOWING: 0
WHEEZING: 0
SORE THROAT: 0
VOMITING: 0
EYE DISCHARGE: 0
NAUSEA: 0
SHORTNESS OF BREATH: 0
ABDOMINAL PAIN: 0

## 2020-11-04 NOTE — PROGRESS NOTES
Progress Note      Pt Name: Coleen Mccoy  YOB: 1970  MRN: [id number]    Date of evaluation: 11/4/2020  History Obtained From:  Patient, EMR    Chief Complaint   Patient presents with    Follow-up     Abnormal laboratory test result      HISTORY OF PRESENT ILLNESS:    Coleen Mccoy is a 57-year-old  female monitored with the following hematology histories:   · Iron deficiency anemia   · mild elevation of IgA    Hemoglobin is stable, 13.9 with MCV 91.2. Elizabeth Acosta continues ferrous sulfate 325 mg daily. Hyperlipidemia, stable 5/22/2020  Hypothyroidism, stable 5/22/2020 with TSH 0.886  GERD is stable on omeprazole. Elizabeth Acosta was previously working toward weight loss due to fatty liver. Since last evaluated 5 months ago she has gained 11 pounds. She takes Questran powder for loose stools following cholecystectomy, stable. She has occasional discomfort to her lumbar spine and is otherwise without new complaint. HEMATOLOGIC HISTORY: Iron Deficiency Anemia/ Mild IgA MGUS  Coleen Mccoy was seen in initial hematologic consultation on 1/15/2019, referred by ALEXA Love regarding iron deficiency anemia. Anemia was noted during a routine office visit, according to Elizabeth Acosta. CBC 12/18/18 included a WBC of 5.2, hemoglobin 8.8 with MCV 76.1 and platelet count 881,623. Differentials were normal.   Serum iron was 26. TSH was 1.17  Elizabeth Acosta has a history of acid reflux, controlled with PPI. She denies hematuria, melena or hematochezia. She is a non-drinker and does not take NSAIDs on a routine basis. She reports regular menstrual cycles lasting 7 days each month. The first 2-4 days are heavy without clots. The upper abdomen is mildly tender upon exam, otherwise negative. Screening colonoscopy 10/02/2018 by Dr. Ralph Espinoza was normal.  GYN evaluation around 2016 by Dr. Luis Grande was negative according to Elizabeth Acosta. Elizabeth Acosta was initiated on oral iron supplementation in December, 2018.   CBC at presentation 1/15/2019 is improved with a hemoglobin of 10.7, MCV 79.9. Platelets are 199,509 and WBC 6.41. Labs drawn 1/15/19 included:  CMP, normal   Iron 78, TIBC 389, iron saturation 20%, ferritin 19   reticulocyte count 1.3   B12 382, folate 13.3   SPEP, no M spike   IgA elevated at 490, IgG and IgM normal   vWB factor antigen, normal at 99   VWB factor activity, normal at 73  CBC at follow-up on 2/12/19 was improved with a hemoglobin of 12.0 with MCV 83.3 and platelet count 878,469. WBC was 5.07. Endoscopy 2/19/2019 by Dr. Iron Edward showed mild gastritis found. There were  no features of celiac disease. Consider GYN evaluation regarding heavy menstrual cycles possibly a contributor of  deficiency. Elevated IgA will be followed peripherally.       Past Medical History:   Diagnosis Date    Anemia     Iron deficiency    Back pain     Chronic cholecystitis without calculus 6/6/2016    Degenerative disc disease     Glaucoma (increased eye pressure)     candidate for due to high pressure/ preventative eye drops    Hepatic steatosis     Kidney stones 9/27/2019    Renal stones      Past Surgical History:   Procedure Laterality Date    CHOLECYSTECTOMY  06/06/2016    MO CYSTO/URETERO/PYELOSCOPY W/LITHOTRIPSY Left 10/31/2017    CYSTOSCOPY URETEROSCOPY stone removal STENT PLACEMENT performed by Patricia Sauer MD at [address] [place] [address] OR     Current Outpatient Medications   Medication Sig Dispense Refill    aspirin 500 MG tablet Take 500 mg by mouth every 6 hours as needed for Pain      diphenhydrAMINE (BENADRYL) 25 MG capsule Take 25 mg by mouth every 6 hours as needed for Itching      cholestyramine (QUESTRAN) 4 GM/DOSE powder Take 1 packet by mouth 2 times daily (with meals) (Patient taking differently: Take 4 g by mouth daily ) 378 g 5    omeprazole 20 MG EC tablet Take 1 tablet by mouth daily 90 tablet 2    ferrous sulfate (IRON 325) 325 (65 Fe) MG tablet TAKE 1 TABLET BY MOUTH EVERY DAY 90 tablet 1    Vitamin D, Cholecalciferol, 50 MCG (2000) CAPS Take 1 capsule by mouth daily 90 capsule 3    NONFORMULARY       loratadine (CLARITIN) 10 MG tablet Take 10 mg by mouth daily      timolol (TIMOPTIC) 0.5 % ophthalmic solution Place 1 drop into both eyes daily       L-Lysine 1000 MG TABS Take by mouth Take one tablet on        No current facility-administered medications for this visit. Allergies   Allergen Reactions    Clindamycin/Lincomycin Other (See Comments)     Pt had rectal bleeding while on this medication     Social History     Tobacco Use    Smoking status: Former Smoker     Packs/day: 0.50     Years: 5.00     Pack years: 2.50     Types: Cigarettes     Last attempt to quit: 2004     Years since quittin.8    Smokeless tobacco: Never Used   Substance Use Topics    Alcohol use: No    Drug use: No     Family History   Problem Relation Age of Onset    Substance Abuse Father     Hypertension Mother     Diabetes Mother     Heart Disease Paternal Grandmother     Breast Cancer Maternal Aunt     Stomach Cancer Maternal Aunt        Review of Systems   Constitutional: Negative for fatigue and fever. No night sweats   HENT: Negative for dental problem, hearing loss, mouth sores, nosebleeds, sore throat and trouble swallowing. Eyes: Negative for discharge and itching. Respiratory: Negative for cough, shortness of breath and wheezing. No hemoptysis   Cardiovascular: Negative for chest pain, palpitations and leg swelling. Gastrointestinal: Negative for abdominal pain, constipation, diarrhea, nausea and vomiting. GERD   Endocrine: Negative for cold intolerance, heat intolerance, polydipsia and polyuria. Genitourinary: Negative for dysuria, frequency, hematuria and urgency. Musculoskeletal: Positive for back pain (lumbar spine). Negative for arthralgias, joint swelling and myalgias. Skin: Negative for pallor and rash.    Allergic/Immunologic: Negative for environmental allergies and immunocompromised state. Neurological: Negative for seizures, syncope and numbness. Hematological: Negative for adenopathy. Does not bruise/bleed easily. Psychiatric/Behavioral: Negative for agitation, behavioral problems and confusion. The patient is not nervous/anxious. Physical Exam  Vitals signs reviewed. Constitutional:       General: She is not in acute distress. Appearance: She is well-developed. She is not toxic-appearing or diaphoretic. HENT:      Head: Normocephalic and atraumatic. Right Ear: External ear normal.      Left Ear: External ear normal.      Nose: Nose normal.      Mouth/Throat:      Mouth: Mucous membranes are moist.   Eyes:      General: No scleral icterus. Right eye: No discharge. Left eye: No discharge. Conjunctiva/sclera: Conjunctivae normal.      Comments: Wears glasses   Neck:      Musculoskeletal: Neck supple. Trachea: No tracheal deviation. Cardiovascular:      Rate and Rhythm: Normal rate and regular rhythm. Heart sounds: No murmur. Pulmonary:      Effort: Pulmonary effort is normal. No respiratory distress. Breath sounds: Normal breath sounds. No wheezing or rales. Abdominal:      General: Bowel sounds are normal. There is no distension. Palpations: Abdomen is soft. Tenderness: There is no abdominal tenderness. There is no guarding. Genitourinary:     Comments: Exam deferred  Musculoskeletal:         General: No tenderness or deformity. Comments: Normal ROM all four extremities   Lymphadenopathy:      Cervical: No cervical adenopathy. Right cervical: No superficial cervical adenopathy. Left cervical: No superficial cervical adenopathy. Upper Body:      Right upper body: No supraclavicular adenopathy. Left upper body: No supraclavicular adenopathy.       Comments: No bulky palpable cervical, clavicular, axillary or inguinal adenopathies on the left or is congratulated on her weight loss and encouraged to continue her efforts. Primary care needs per ALEXA Patel. Cancer Screenings/abnormal mammogram of the right breast  · Mammograms 7/7/2020 at [facility]: Asymmetric density right breast persists, no ultrasound correlate. Finding indeterminate, though does not have overt malignant characteristics and is probably benign. BI-RADS Category 3.  6-month right mammography recommended for further evaluation, with ultrasound if needed and is already scheduled on 1/12/2021  · Pap-smear 6/9/2020 by ZARI Walker was negative  · Colonoscopy 10/02/2018 by Dr. Droi Galdamez was normal.  Defer arrangements for health maintenance screening to PCP. Orders Placed This Encounter   Procedures    Iron and TIBC    Ferritin    IgA    Electrophoresis Protein, Serum with Reflex to Immunofixation    Comprehensive Metabolic Panel     CBC in 6 months  Return in about 1 year (around 11/4/2021) for follow up with ALEXA Young. I have seen, examined and reviewed patient medication list, appropriate labs and imaging studies. Relevant medical records and other physician notes have been reviewed. I answered all questions to the best of my knowledge and to the patient's satisfaction. Dictated utilizing Dragon transcription software.          ALEXA Clement  10:34 AM  11/4/2020

## 2020-11-05 ENCOUNTER — TELEPHONE (OUTPATIENT)
Dept: INFUSION THERAPY | Age: 50
End: 2020-11-05

## 2020-11-05 NOTE — TELEPHONE ENCOUNTER
Attempted to contact patient per ALEXA Camara to inform patient her iron labs looked good and that she should continue her iron pill. No answer, left message.

## 2020-12-15 ENCOUNTER — OFFICE VISIT (OUTPATIENT)
Dept: FAMILY MEDICINE CLINIC | Age: 50
End: 2020-12-15
Payer: MEDICAID

## 2020-12-15 VITALS
DIASTOLIC BLOOD PRESSURE: 64 MMHG | WEIGHT: 191.2 LBS | TEMPERATURE: 97.6 F | BODY MASS INDEX: 31.82 KG/M2 | SYSTOLIC BLOOD PRESSURE: 116 MMHG | HEART RATE: 76 BPM | OXYGEN SATURATION: 98 %

## 2020-12-15 PROBLEM — F41.8 SITUATIONAL ANXIETY: Status: ACTIVE | Noted: 2020-12-15

## 2020-12-15 PROCEDURE — 99214 OFFICE O/P EST MOD 30 MIN: CPT | Performed by: CLINICAL NURSE SPECIALIST

## 2020-12-15 RX ORDER — NICOTINE POLACRILEX 4 MG/1
20 GUM, CHEWING ORAL DAILY
Qty: 90 TABLET | Refills: 2 | Status: SHIPPED | OUTPATIENT
Start: 2020-12-15 | End: 2021-09-02

## 2020-12-15 NOTE — PROGRESS NOTES
Medication Sig Dispense Refill    omeprazole 20 MG EC tablet Take 1 tablet by mouth daily 90 tablet 2    aspirin 500 MG tablet Take 500 mg by mouth every 6 hours as needed for Pain      diphenhydrAMINE (BENADRYL) 25 MG capsule Take 25 mg by mouth every 6 hours as needed for Itching      cholestyramine (QUESTRAN) 4 GM/DOSE powder Take 1 packet by mouth 2 times daily (with meals) 378 g 5    ferrous sulfate (IRON 325) 325 (65 Fe) MG tablet TAKE 1 TABLET BY MOUTH EVERY DAY 90 tablet 1    Vitamin D, Cholecalciferol, 50 MCG (2000 UT) CAPS Take 1 capsule by mouth daily 90 capsule 3    NONFORMULARY       loratadine (CLARITIN) 10 MG tablet Take 10 mg by mouth daily      timolol (TIMOPTIC) 0.5 % ophthalmic solution Place 1 drop into both eyes daily       L-Lysine 1000 MG TABS Take by mouth Take one tablet on Fridays       No current facility-administered medications for this visit. Allergies   Allergen Reactions    Clindamycin/Lincomycin Other (See Comments)     Pt had rectal bleeding while on this medication       Review of Systems   Constitutional: Negative for appetite change, chills, diaphoresis, fatigue and fever. HENT: Negative for congestion, ear pain, facial swelling, hearing loss, postnasal drip, sinus pressure, sore throat and trouble swallowing. Eyes: Negative for pain, discharge, redness and visual disturbance. Respiratory: Negative for cough, chest tightness, shortness of breath and wheezing. Cardiovascular: Negative for chest pain and palpitations. Gastrointestinal: Positive for diarrhea. Negative for abdominal pain, constipation, nausea and vomiting. Endocrine: Negative for cold intolerance and heat intolerance. Genitourinary: Negative for difficulty urinating, dysuria, flank pain, frequency, hematuria and urgency. Musculoskeletal: Negative for arthralgias, back pain, joint swelling and neck pain. Skin: Negative for color change and rash. Neurological: Negative for dizziness, syncope, weakness, light-headedness and headaches. Hematological: Negative for adenopathy. Does not bruise/bleed easily. Psychiatric/Behavioral: Negative for confusion, dysphoric mood, sleep disturbance and suicidal ideas. The patient is nervous/anxious. OBJECTIVE:  /64   Pulse 76   Temp 97.6 °F (36.4 °C) (Temporal)   Wt 191 lb 3.2 oz (86.7 kg)   SpO2 98%   BMI 31.82 kg/m²    Physical Exam  Vitals signs reviewed. Constitutional:       General: She is not in acute distress. Appearance: She is well-developed. She is not ill-appearing or toxic-appearing. HENT:      Head: Normocephalic and atraumatic. Eyes:      General:         Right eye: No discharge. Left eye: No discharge. Conjunctiva/sclera: Conjunctivae normal.      Pupils: Pupils are equal, round, and reactive to light. Neck:      Musculoskeletal: Normal range of motion and neck supple. Thyroid: No thyromegaly. Trachea: No tracheal deviation. Cardiovascular:      Rate and Rhythm: Normal rate and regular rhythm. Heart sounds: No murmur. Pulmonary:      Effort: Pulmonary effort is normal. No respiratory distress. Breath sounds: Normal breath sounds. No wheezing or rales. Genitourinary:     Vagina: Normal.   Musculoskeletal: Normal range of motion. General: No deformity. Right lower leg: No edema. Left lower leg: No edema. Lymphadenopathy:      Cervical: No cervical adenopathy. Skin:     General: Skin is warm and dry. Findings: No erythema or rash. Neurological:      Mental Status: She is alert and oriented to person, place, and time. Cranial Nerves: No cranial nerve deficit. Psychiatric:         Mood and Affect: Mood normal.         Behavior: Behavior normal.         Thought Content: Thought content normal.         Judgment: Judgment normal.         ASSESSMENT/PLAN:  1.  Situational anxiety

## 2020-12-16 ASSESSMENT — ENCOUNTER SYMPTOMS
TROUBLE SWALLOWING: 0
FACIAL SWELLING: 0
WHEEZING: 0
CONSTIPATION: 0
NAUSEA: 0
EYE REDNESS: 0
VOMITING: 0
DIARRHEA: 1
EYE PAIN: 0
SORE THROAT: 0
EYE DISCHARGE: 0
BACK PAIN: 0
CHEST TIGHTNESS: 0
SHORTNESS OF BREATH: 0
ABDOMINAL PAIN: 0
COUGH: 0
SINUS PRESSURE: 0
COLOR CHANGE: 0

## 2020-12-18 RX ORDER — CHOLESTYRAMINE 4 G/9G
POWDER, FOR SUSPENSION ORAL
Qty: 60 PACKET | Refills: 5 | Status: SHIPPED | OUTPATIENT
Start: 2020-12-18 | End: 2021-06-15

## 2020-12-18 RX ORDER — FERROUS SULFATE 325(65) MG
TABLET ORAL
Qty: 90 TABLET | Refills: 1 | Status: SHIPPED | OUTPATIENT
Start: 2020-12-18 | End: 2021-06-06

## 2021-01-12 ENCOUNTER — HOSPITAL ENCOUNTER (OUTPATIENT)
Dept: WOMENS IMAGING | Age: 51
Discharge: HOME | End: 2021-01-12
Payer: MEDICAID

## 2021-01-12 DIAGNOSIS — R92.8 ABNORMAL MAMMOGRAM: ICD-10-CM

## 2021-01-12 PROCEDURE — G0279 TOMOSYNTHESIS, MAMMO: HCPCS

## 2021-01-12 PROCEDURE — 76642 ULTRASOUND BREAST LIMITED: CPT

## 2021-03-25 ENCOUNTER — OFFICE VISIT (OUTPATIENT)
Dept: URGENT CARE | Age: 51
End: 2021-03-25
Payer: MEDICAID

## 2021-03-25 VITALS
HEART RATE: 77 BPM | DIASTOLIC BLOOD PRESSURE: 81 MMHG | TEMPERATURE: 97.8 F | SYSTOLIC BLOOD PRESSURE: 119 MMHG | OXYGEN SATURATION: 98 % | RESPIRATION RATE: 20 BRPM

## 2021-03-25 DIAGNOSIS — L84 CORN OF TOE: Primary | ICD-10-CM

## 2021-03-25 DIAGNOSIS — L98.9 LESION OF SKIN OF FOOT: ICD-10-CM

## 2021-03-25 PROCEDURE — 99213 OFFICE O/P EST LOW 20 MIN: CPT | Performed by: NURSE PRACTITIONER

## 2021-03-25 NOTE — PATIENT INSTRUCTIONS
Patient Education      Apply bactroban to toes twice daily     Wear proper fitting shoes- make sure they are not too tight    Use toe separator, corn pads, or gauze between toe to prevent friction, rubbing, and sores    Return as needed or if area worsens. Corns and Calluses: Care Instructions  Your Care Instructions  Corns and calluses are areas of thick, hard, dead skin. They form to protect your skin from injury. Corns usually form where toes rub together. Calluses often form on the hands or feet. They may form wherever the skin rubs against something, such as shoes. In most cases, you can take steps at home to care for a corn or callus. Follow-up care is a key part of your treatment and safety. Be sure to make and go to all appointments, and call your doctor if you are having problems. It's also a good idea to know your test results and keep a list of the medicines you take. How can you care for yourself at home? · Wear shoes and other footwear that fit correctly and are roomy. This will reduce rubbing and give corns or calluses time to heal.  · Use protective pads, such as moleskin, to cushion the callus or corn. · Soften the corn or callus and remove the dead skin by using over-the-counter products such as salicylic acid. If you have a condition that causes problems with blood flow (such as peripheral vascular disease) or loss of feeling in your feet (such as diabetes), talk to your doctor before you try any home treatment. · Soak your corn or callus in warm water, and then use a pumice stone to rub dead skin away. · Wash your feet regularly, and rub lotion into your feet while they are still moist. Dry skin can cause a callus to crack and bleed. · Never cut the corn or callus yourself, especially if you have problems with blood flow to your legs or feet. When should you call for help?    Call your doctor now or seek immediate medical care if:    · You have signs of infection, such as:  ? Increased pain, swelling, warmth, or redness around the corn or callus. ? Red streaks leading from the corn or callus. ? Pus draining from the corn or callus. ? A fever. Watch closely for changes in your health, and be sure to contact your doctor if:    · You do not get better as expected. Where can you learn more? Go to [email or web address]. org and sign in to your [employer] account. Enter R244 in the Exacter box to learn more about \"Corns and Calluses: Care Instructions. \"     If you do not have an account, please click on the \"Sign Up Now\" link. Current as of: July 2, 2020               Content Version: 12.8  © 2006-2021 [employer]. Care instructions adapted under license by [facility] ([facility]). If you have questions about a medical condition or this instruction, always ask your healthcare professional. [address] any warranty or liability for your use of this information.

## 2021-03-25 NOTE — PROGRESS NOTES
[address] [place] [address]   [address], [id number]     Phone:  [phone]  Fax:  [phone]      Carlin Partida is a 46 y.o. female who presents today for her medical conditions/complaints as noted below. Carlin Partida is c/o of Toe Pain (pt reports her L foot pinky toe is hurting, ongoing for several months now, hurts when walking and she walks at work a lot now )      Chief Complaint   Patient presents with    Toe Pain     pt reports her L foot pinky toe is hurting, ongoing for several months now, hurts when walking and she walks at work a lot now        HPI:     HPI    Carlin Partida presents today for pain in her right small toe. This started a few months ago. She denies any injury. This area is red and tender. She walks a lot as a  for work. She has not had treatment for this. She has a lesion in between her toes from her toes rubbing together as well. She can walk on this foot. She denies numbness or tingling.      Past Medical History:   Diagnosis Date    Anemia     Iron deficiency    Back pain     Chronic cholecystitis without calculus 2016    Degenerative disc disease     Glaucoma (increased eye pressure)     candidate for due to high pressure/ preventative eye drops    Hepatic steatosis     Kidney stones 2019    Renal stones         Past Surgical History:   Procedure Laterality Date    CHOLECYSTECTOMY  2016    CHOLECYSTECTOMY  2016    KIDNEY STONE SURGERY      [place] CYSTO/URETERO/PYELOSCOPY W/LITHOTRIPSY Left 10/31/2017    CYSTOSCOPY URETEROSCOPY stone removal STENT PLACEMENT performed by Audrene Boxer, MD at [address] [place] [address] OR       Social History     Tobacco Use    Smoking status: Former Smoker     Packs/day: 0.50     Years: 5.00     Pack years: 2.50     Types: Cigarettes     Quit date: 2004     Years since quittin.2    Smokeless tobacco: Never Used   Substance Use Topics    Alcohol use: No        Current Outpatient Medications   Medication Sig Dispense Refill    mupirocin (BACTROBAN) 2 % ointment Apply 3 times daily. 1 Tube 0    ferrous sulfate (IRON 325) 325 (65 Fe) MG tablet TAKE 1 TABLET BY MOUTH EVERY DAY 90 tablet 1    omeprazole 20 MG EC tablet Take 1 tablet by mouth daily 90 tablet 2    aspirin 500 MG tablet Take 500 mg by mouth every 6 hours as needed for Pain      diphenhydrAMINE (BENADRYL) 25 MG capsule Take 25 mg by mouth every 6 hours as needed for Itching      Vitamin D, Cholecalciferol, 50 MCG (2000 UT) CAPS Take 1 capsule by mouth daily 90 capsule 3    NONFORMULARY       loratadine (CLARITIN) 10 MG tablet Take 10 mg by mouth daily      timolol (TIMOPTIC) 0.5 % ophthalmic solution Place 1 drop into both eyes daily       L-Lysine 1000 MG TABS Take by mouth Take one tablet on Fridays      cholestyramine (QUESTRAN) 4 g packet TAKE 1 PACKET BY MOUTH MIXED IN JUICE 2 TIMES DAILY (WITH MEALS) (Patient not taking: Reported on 3/25/2021) 60 packet 5     No current facility-administered medications for this visit. Allergies   Allergen Reactions    Clindamycin/Lincomycin Other (See Comments)     Pt had rectal bleeding while on this medication       Family History   Problem Relation Age of Onset    Substance Abuse Father     Hypertension Mother     Diabetes Mother     Heart Disease Paternal Grandmother     Breast Cancer Maternal Aunt     Stomach Cancer Maternal Aunt                Review of Systems   Constitutional: Negative for fever. Musculoskeletal: Positive for arthralgias. Negative for gait problem. Skin: Positive for wound. Neurological: Negative for numbness. Objective:     Physical Exam  Vitals signs and nursing note reviewed. Constitutional:       General: She is not in acute distress. Appearance: Normal appearance. She is not ill-appearing, toxic-appearing or diaphoretic. HENT:      Head: Normocephalic and atraumatic.       Right Ear: External ear normal.      Left Ear: External ear normal. Nose: Nose normal.   Eyes:      General:         Right eye: No discharge. Left eye: No discharge. Conjunctiva/sclera: Conjunctivae normal.   Neck:      Musculoskeletal: Normal range of motion and neck supple. Cardiovascular:      Rate and Rhythm: Normal rate. Pulmonary:      Effort: Pulmonary effort is normal. No respiratory distress. Breath sounds: Normal breath sounds. No wheezing or rhonchi. Musculoskeletal:        Feet:    Skin:     General: Skin is warm and dry. Capillary Refill: Capillary refill takes less than 2 seconds. Findings: Lesion present. Neurological:      Mental Status: She is alert and oriented to person, place, and time. Psychiatric:         Mood and Affect: Mood normal.         Behavior: Behavior normal.         /81   Pulse 77   Temp 97.8 °F (36.6 °C) (Temporal)   Resp 20   SpO2 98%     Assessment:      Diagnosis Orders   1. Corn of toe     2. Lesion of skin of foot         No results found for this visit on 03/25/21. Plan:      Apply bactroban to toes twice daily     Wear proper fitting shoes- make sure they are not too tight    Use toe separator, corn pads, or gauze between toe to prevent friction, rubbing, and sores    Consider podiatry consult if does not improve or becomes more painful. Return if symptoms worsen or fail to improve. No orders of the defined types were placed in this encounter. Orders Placed This Encounter   Medications    mupirocin (BACTROBAN) 2 % ointment     Sig: Apply 3 times daily. Dispense:  1 Tube     Refill:  0        Patient offered educational materials - see patient instructions for any instruction needed. Discussed use, benefit, and side effects of prescribed medications. All patient questions answered. Instructed to continue current medications, diet and exercise. Patient agreed with treatment plan. Follow up as directed. Patient was advised to go to the ED if condition ever becomes emergent.

## 2021-05-06 ENCOUNTER — HOSPITAL ENCOUNTER (OUTPATIENT)
Dept: INFUSION THERAPY | Age: 51
Discharge: HOME | End: 2021-05-06
Payer: MEDICAID

## 2021-05-06 DIAGNOSIS — D50.9 IRON DEFICIENCY ANEMIA, UNSPECIFIED IRON DEFICIENCY ANEMIA TYPE: ICD-10-CM

## 2021-05-06 LAB
BASOPHILS ABSOLUTE: 0.01 K/UL (ref 0.01–0.08)
BASOPHILS RELATIVE PERCENT: 0.1 % (ref 0.1–1.2)
EOSINOPHILS ABSOLUTE: 0.06 K/UL (ref 0.04–0.54)
EOSINOPHILS RELATIVE PERCENT: 0.9 % (ref 0.7–7)
HCT VFR BLD CALC: 38.8 % (ref 34.1–44.9)
HEMOGLOBIN: 12.5 G/DL (ref 11.2–15.7)
LYMPHOCYTES ABSOLUTE: 1.71 K/UL (ref 1.18–3.74)
LYMPHOCYTES RELATIVE PERCENT: 25.4 % (ref 19.3–53.1)
MCH RBC QN AUTO: 29.8 PG (ref 25.6–32.2)
MCHC RBC AUTO-ENTMCNC: 32.2 G/DL (ref 32.3–35.5)
MCV RBC AUTO: 92.4 FL (ref 79.4–94.8)
MONOCYTES ABSOLUTE: 0.51 K/UL (ref 0.24–0.82)
MONOCYTES RELATIVE PERCENT: 7.6 % (ref 4.7–12.5)
NEUTROPHILS ABSOLUTE: 4.45 K/UL (ref 1.56–6.13)
NEUTROPHILS RELATIVE PERCENT: 66 % (ref 34–71.1)
PDW BLD-RTO: 12.4 % (ref 11.7–14.4)
PLATELET # BLD: 212 K/UL (ref 182–369)
PMV BLD AUTO: 10 FL (ref 7.4–10.4)
RBC # BLD: 4.2 M/UL (ref 3.93–5.22)
WBC # BLD: 6.74 K/UL (ref 3.98–10.04)

## 2021-05-06 PROCEDURE — 85025 COMPLETE CBC W/AUTO DIFF WBC: CPT

## 2021-05-21 DIAGNOSIS — E55.9 VITAMIN D DEFICIENCY: ICD-10-CM

## 2021-05-21 RX ORDER — CALCIUM CARBONATE/VITAMIN D3 600 MG-20
TABLET ORAL
Qty: 90 CAPSULE | Refills: 3 | Status: SHIPPED | OUTPATIENT
Start: 2021-05-21 | End: 2022-05-25

## 2021-06-11 DIAGNOSIS — E55.9 VITAMIN D DEFICIENCY: ICD-10-CM

## 2021-06-11 DIAGNOSIS — Z13.220 LIPID SCREENING: ICD-10-CM

## 2021-06-11 DIAGNOSIS — D50.9 IRON DEFICIENCY ANEMIA, UNSPECIFIED IRON DEFICIENCY ANEMIA TYPE: ICD-10-CM

## 2021-06-11 LAB
ALBUMIN SERPL-MCNC: 4.1 G/DL (ref 3.5–5.2)
ALP BLD-CCNC: 69 U/L (ref 35–104)
ALT SERPL-CCNC: 9 U/L (ref 5–33)
ANION GAP SERPL CALCULATED.3IONS-SCNC: 9 MMOL/L (ref 7–19)
AST SERPL-CCNC: 13 U/L (ref 5–32)
BASOPHILS ABSOLUTE: 0 K/UL (ref 0–0.2)
BASOPHILS RELATIVE PERCENT: 0.3 % (ref 0–1)
BILIRUB SERPL-MCNC: 0.6 MG/DL (ref 0.2–1.2)
BUN BLDV-MCNC: 11 MG/DL (ref 6–20)
CALCIUM SERPL-MCNC: 9 MG/DL (ref 8.6–10)
CHLORIDE BLD-SCNC: 107 MMOL/L (ref 98–111)
CHOLESTEROL, TOTAL: 158 MG/DL (ref 160–199)
CO2: 25 MMOL/L (ref 22–29)
CREAT SERPL-MCNC: 0.6 MG/DL (ref 0.5–0.9)
EOSINOPHILS ABSOLUTE: 0 K/UL (ref 0–0.6)
EOSINOPHILS RELATIVE PERCENT: 0.3 % (ref 0–5)
GFR AFRICAN AMERICAN: >59
GFR NON-AFRICAN AMERICAN: >60
GLUCOSE BLD-MCNC: 100 MG/DL (ref 74–109)
HCT VFR BLD CALC: 40.5 % (ref 37–47)
HDLC SERPL-MCNC: 63 MG/DL (ref 65–121)
HEMOGLOBIN: 13.5 G/DL (ref 12–16)
IMMATURE GRANULOCYTES #: 0 K/UL
LDL CHOLESTEROL CALCULATED: 80 MG/DL
LYMPHOCYTES ABSOLUTE: 1.4 K/UL (ref 1.1–4.5)
LYMPHOCYTES RELATIVE PERCENT: 24.3 % (ref 20–40)
MCH RBC QN AUTO: 29.6 PG (ref 27–31)
MCHC RBC AUTO-ENTMCNC: 33.3 G/DL (ref 33–37)
MCV RBC AUTO: 88.8 FL (ref 81–99)
MONOCYTES ABSOLUTE: 0.4 K/UL (ref 0–0.9)
MONOCYTES RELATIVE PERCENT: 6.8 % (ref 0–10)
NEUTROPHILS ABSOLUTE: 3.9 K/UL (ref 1.5–7.5)
NEUTROPHILS RELATIVE PERCENT: 68 % (ref 50–65)
PDW BLD-RTO: 12.5 % (ref 11.5–14.5)
PLATELET # BLD: 241 K/UL (ref 130–400)
PMV BLD AUTO: 10.1 FL (ref 9.4–12.3)
POTASSIUM SERPL-SCNC: 4 MMOL/L (ref 3.5–5)
RBC # BLD: 4.56 M/UL (ref 4.2–5.4)
SODIUM BLD-SCNC: 141 MMOL/L (ref 136–145)
TOTAL PROTEIN: 7.3 G/DL (ref 6.6–8.7)
TRIGL SERPL-MCNC: 73 MG/DL (ref 0–149)
VITAMIN D 25-HYDROXY: 29.7 NG/ML
WBC # BLD: 5.8 K/UL (ref 4.8–10.8)

## 2021-06-15 RX ORDER — CHOLESTYRAMINE 4 G/9G
POWDER, FOR SUSPENSION ORAL
Qty: 60 PACKET | Refills: 5 | Status: SHIPPED | OUTPATIENT
Start: 2021-06-15 | End: 2021-12-12

## 2021-06-18 ENCOUNTER — OFFICE VISIT (OUTPATIENT)
Dept: FAMILY MEDICINE CLINIC | Age: 51
End: 2021-06-18
Payer: MEDICAID

## 2021-06-18 VITALS
TEMPERATURE: 97.7 F | DIASTOLIC BLOOD PRESSURE: 72 MMHG | OXYGEN SATURATION: 98 % | HEART RATE: 66 BPM | RESPIRATION RATE: 14 BRPM | SYSTOLIC BLOOD PRESSURE: 118 MMHG | BODY MASS INDEX: 31.19 KG/M2 | WEIGHT: 187.2 LBS | HEIGHT: 65 IN

## 2021-06-18 DIAGNOSIS — R92.2 BREAST DENSITY: ICD-10-CM

## 2021-06-18 DIAGNOSIS — E55.9 VITAMIN D DEFICIENCY: ICD-10-CM

## 2021-06-18 DIAGNOSIS — K21.9 GASTROESOPHAGEAL REFLUX DISEASE, UNSPECIFIED WHETHER ESOPHAGITIS PRESENT: ICD-10-CM

## 2021-06-18 DIAGNOSIS — F90.0 ATTENTION DEFICIT HYPERACTIVITY DISORDER (ADHD), PREDOMINANTLY INATTENTIVE TYPE: ICD-10-CM

## 2021-06-18 DIAGNOSIS — K91.1 DUMPING SYNDROME: Primary | ICD-10-CM

## 2021-06-18 DIAGNOSIS — Z00.00 HEALTHCARE MAINTENANCE: ICD-10-CM

## 2021-06-18 DIAGNOSIS — B36.9 FUNGAL SKIN INFECTION: ICD-10-CM

## 2021-06-18 PROCEDURE — 99214 OFFICE O/P EST MOD 30 MIN: CPT | Performed by: CLINICAL NURSE SPECIALIST

## 2021-06-18 RX ORDER — CLOTRIMAZOLE AND BETAMETHASONE DIPROPIONATE 10; .64 MG/G; MG/G
CREAM TOPICAL
Qty: 45 G | Refills: 0 | Status: SHIPPED | OUTPATIENT
Start: 2021-06-18 | End: 2021-11-04

## 2021-06-18 RX ORDER — ATOMOXETINE 10 MG/1
10 CAPSULE ORAL DAILY
COMMUNITY

## 2021-06-18 ASSESSMENT — PATIENT HEALTH QUESTIONNAIRE - PHQ9
SUM OF ALL RESPONSES TO PHQ9 QUESTIONS 1 & 2: 0
SUM OF ALL RESPONSES TO PHQ QUESTIONS 1-9: 0
2. FEELING DOWN, DEPRESSED OR HOPELESS: 0
1. LITTLE INTEREST OR PLEASURE IN DOING THINGS: 0

## 2021-06-18 ASSESSMENT — ENCOUNTER SYMPTOMS
CONSTIPATION: 0
COLOR CHANGE: 0
NAUSEA: 0
TROUBLE SWALLOWING: 0
EYE DISCHARGE: 0
VOMITING: 0
SHORTNESS OF BREATH: 0
FACIAL SWELLING: 0
BACK PAIN: 0
COUGH: 0
DIARRHEA: 1
EYE PAIN: 0
CHEST TIGHTNESS: 0
SORE THROAT: 0
ABDOMINAL PAIN: 0
WHEEZING: 0
EYE REDNESS: 0
SINUS PRESSURE: 0

## 2021-06-18 NOTE — PROGRESS NOTES
SUBJECTIVE:  Sejal Bullock is a 46 y.o. who presents today for Anxiety (6 month follow up; doing well)      HPI    Ms Avani Manning presents today for 6 month follow up. She is due for her 6 month breast follow up. 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 No bowel changes to report. No side effects. Colonoscopy UTD. GERD, controlled on PPI. No melena or nausea. No side effects. Vitamin D def, slightly low. She is not able to take on weekends due to work schedule. Calcium WNL. Mild ADHD-I, diagnosed by Dr Surendra Mohr. She started strattera 10mg some time ago and is doing very well on this without side effects. She has ongoing skin lesion to her left distal, posterior calf and lower leg. She thought started as bed bug hit, but now with lesions up her entire posterior leg below knee. She has used cortisone which helps the itching.      Past Medical History:   Diagnosis Date    Anemia     Iron deficiency    Back pain     Chronic cholecystitis without calculus 2016    Degenerative disc disease     Glaucoma (increased eye pressure)     candidate for due to high pressure/ preventative eye drops    Hepatic steatosis     Kidney stones 2019    Renal stones      Past Surgical History:   Procedure Laterality Date    CHOLECYSTECTOMY  2016    CHOLECYSTECTOMY  2016    KIDNEY STONE SURGERY      [facility] CYSTO/URETERO/PYELOSCOPY W/LITHOTRIPSY Left 10/31/2017    CYSTOSCOPY URETEROSCOPY stone removal STENT PLACEMENT performed by Georgia Tobin MD at [facility] History   Problem Relation Age of Onset    Substance Abuse Father     Hypertension Mother     Diabetes Mother     Heart Disease Paternal Grandmother     Breast Cancer Maternal Aunt     Stomach Cancer Maternal Aunt      Social History     Tobacco Use    Smoking status: Former Smoker     Packs/day: 0.50     Years: 5.00     Pack years: 2.50     Types: Cigarettes     Quit date: 2004     Years since quittin.4    hematuria and urgency. Musculoskeletal: Negative for arthralgias, back pain, joint swelling and neck pain. Skin: Positive for rash. Negative for color change. Neurological: Negative for dizziness, syncope, weakness, light-headedness and headaches. Hematological: Negative for adenopathy. Does not bruise/bleed easily. Psychiatric/Behavioral: Positive for decreased concentration. Negative for confusion, dysphoric mood, sleep disturbance and suicidal ideas. The patient is not nervous/anxious. OBJECTIVE:  /72   Pulse 66   Temp 97.7 °F (36.5 °C) (Temporal)   Resp 14   Ht 5' 5\" (1.651 m)   Wt 187 lb 3.2 oz (84.9 kg)   SpO2 98%   BMI 31.15 kg/m²    Physical Exam  Vitals reviewed. Constitutional:       General: She is not in acute distress. Appearance: She is well-developed. She is not ill-appearing or toxic-appearing. HENT:      Head: Normocephalic and atraumatic. Eyes:      General:         Right eye: No discharge. Left eye: No discharge. Conjunctiva/sclera: Conjunctivae normal.      Pupils: Pupils are equal, round, and reactive to light. Neck:      Thyroid: No thyromegaly. Trachea: No tracheal deviation. Cardiovascular:      Rate and Rhythm: Normal rate and regular rhythm. Heart sounds: No murmur heard. Pulmonary:      Effort: Pulmonary effort is normal. No respiratory distress. Breath sounds: Normal breath sounds. No wheezing or rales. Genitourinary:     Vagina: Normal.   Musculoskeletal:         General: No deformity. Normal range of motion. Cervical back: Normal range of motion and neck supple. Right lower leg: No edema. Left lower leg: No edema. Lymphadenopathy:      Cervical: No cervical adenopathy. Skin:     General: Skin is warm and dry. Findings: Rash present. No erythema. Rash is macular and papular. Neurological:      Mental Status: She is alert and oriented to person, place, and time.  Mental status is at baseline. Motor: No weakness. Gait: Gait normal.   Psychiatric:         Mood and Affect: Mood normal.         Behavior: Behavior normal.         Thought Content: Thought content normal.         Judgment: Judgment normal.         Lab Results   Component Value Date    WBC 5.8 06/11/2021    HGB 13.5 06/11/2021    HCT 40.5 06/11/2021    MCV 88.8 06/11/2021     06/11/2021     Lab Results   Component Value Date     06/11/2021    K 4.0 06/11/2021     06/11/2021    CO2 25 06/11/2021    BUN 11 06/11/2021    CREATININE 0.6 06/11/2021    GLUCOSE 100 06/11/2021    CALCIUM 9.0 06/11/2021    PROT 7.3 06/11/2021    LABALBU 4.1 06/11/2021    BILITOT 0.6 06/11/2021    ALKPHOS 69 06/11/2021    AST 13 06/11/2021    ALT 9 06/11/2021    LABGLOM >60 06/11/2021    GFRAA >59 06/11/2021    AGRATIO 1.0 11/04/2020    GLOB 3.6 11/04/2020       Lab Results   Component Value Date    VITD25 29.7 (L) 06/11/2021     Lab Results   Component Value Date    CHOL 158 (L) 06/11/2021    CHOL 178 05/22/2020    CHOL 173 09/30/2015     Lab Results   Component Value Date    TRIG 73 06/11/2021    TRIG 114 05/22/2020    TRIG 77 (L) 09/30/2015     Lab Results   Component Value Date    HDL 63 (L) 06/11/2021    HDL 57 (L) 05/22/2020    HDL 49 (L) 09/30/2015     Lab Results   Component Value Date    LDLCALC 80 06/11/2021    LDLCALC 98 05/22/2020     No results found for: LABVLDL, VLDL  No results found for: CHOLHDLRATIO    ASSESSMENT/PLAN:  1. Dumping syndrome  Controlled, same medications    2. Gastroesophageal reflux disease, unspecified whether esophagitis present  Controlled, same medications    3. Vitamin D deficiency  Controlled, same medications  - Vitamin D 25 Hydroxy; Future    4. Attention deficit hyperactivity disorder (ADHD), predominantly inattentive type  Followed by psych    5. Fungal skin infection  new  - clotrimazole-betamethasone (LOTRISONE) 1-0.05 % cream; Apply topically 2 times daily.   Dispense: 45 g; Refill: 0 6. Breast density  - DEZ DIGITAL DIAGNOSTIC W OR WO CAD BILATERAL; Future    7. Healthcare maintenance  - CBC Auto Differential; Future  - Comprehensive Metabolic Panel; Future  - Lipid Panel; Future          Return in about 1 year (around 6/18/2022) for physical, Labs one week prior. Elin Dueñas

## 2021-07-22 ENCOUNTER — OFFICE VISIT (OUTPATIENT)
Dept: URGENT CARE | Age: 51
End: 2021-07-22
Payer: MEDICAID

## 2021-07-22 VITALS
OXYGEN SATURATION: 100 % | DIASTOLIC BLOOD PRESSURE: 76 MMHG | TEMPERATURE: 97.7 F | SYSTOLIC BLOOD PRESSURE: 116 MMHG | HEART RATE: 65 BPM

## 2021-07-22 DIAGNOSIS — L25.9 CONTACT DERMATITIS, UNSPECIFIED CONTACT DERMATITIS TYPE, UNSPECIFIED TRIGGER: Primary | ICD-10-CM

## 2021-07-22 PROCEDURE — 99213 OFFICE O/P EST LOW 20 MIN: CPT | Performed by: NURSE PRACTITIONER

## 2021-07-22 RX ORDER — TRIAMCINOLONE ACETONIDE 1 MG/G
CREAM TOPICAL
Qty: 1 TUBE | Refills: 0 | Status: SHIPPED | OUTPATIENT
Start: 2021-07-22 | End: 2021-11-04

## 2021-07-22 ASSESSMENT — ENCOUNTER SYMPTOMS: COUGH: 0

## 2021-07-22 NOTE — PATIENT INSTRUCTIONS
1. Start using steroid cream as prescribed. 2. Contact insurance as we discussed to get into dermatology if this is not improved. 3. Contact professional  to assist in apartment as we discussed.

## 2021-07-22 NOTE — PROGRESS NOTES
[address] [facility]  [address] [address] [place] [address]  Dept: [phone]  Dept Fax: [phone]  Loc: [phone]    Dave Fong is a 46 y.o. female who presents today for her medical conditions/complaintsas noted below. Dave Fong is c/o of Rash (bilateral lower legs, been using cream from PCP, not improving. hx of bed bugs in apt building)        HPI:     Rash  This is a new problem. The current episode started 1 to 4 weeks ago. The problem is unchanged. Location: BLE. The rash is characterized by redness and itchiness. Associated with: has known bed bugs in apartment. Pertinent negatives include no congestion, cough or fever. Treatments tried: clotrimazole with betamethasone for 2 weeks. The treatment provided no relief.        Past Medical History:   Diagnosis Date    Anemia     Iron deficiency    Back pain     Chronic cholecystitis without calculus 2016    Degenerative disc disease     Glaucoma (increased eye pressure)     candidate for due to high pressure/ preventative eye drops    Hepatic steatosis     Kidney stones 2019    Renal stones      Past Surgical History:   Procedure Laterality Date    CHOLECYSTECTOMY  2016    CHOLECYSTECTOMY  2016    KIDNEY STONE SURGERY      [place] CYSTO/URETERO/PYELOSCOPY W/LITHOTRIPSY Left 10/31/2017    CYSTOSCOPY URETEROSCOPY stone removal STENT PLACEMENT performed by Fabien Marquez MD at [facility] History   Problem Relation Age of Onset    Substance Abuse Father     Hypertension Mother     Diabetes Mother     Heart Disease Paternal Grandmother     Breast Cancer Maternal Aunt     Stomach Cancer Maternal Aunt        Social History     Tobacco Use    Smoking status: Former Smoker     Packs/day: 0.50     Years: 5.00     Pack years: 2.50     Types: Cigarettes     Quit date: 2004     Years since quittin.5    Smokeless tobacco: Never Used   Substance Use Topics    Alcohol use: No      Current Outpatient Medications   Medication Sig Dispense Refill    triamcinolone (KENALOG) 0.1 % cream Apply topically 2 times daily. 1 Tube 0    atomoxetine (STRATTERA) 10 MG capsule Take 10 mg by mouth daily      clotrimazole-betamethasone (LOTRISONE) 1-0.05 % cream Apply topically 2 times daily. 45 g 0    cholestyramine (QUESTRAN) 4 g packet TAKE 1 PACKET BY MOUTH MIXED IN JUICE 2 TIMES DAILY (WITH MEALS) 60 packet 5    ferrous sulfate (IRON 325) 325 (65 Fe) MG tablet TAKE 1 TABLET BY MOUTH EVERY DAY 90 tablet 0    [employer] D3 50 MCG (2000 UT) CAPS TAKE 1 CAPSULE BY MOUTH EVERY DAY 90 capsule 3    omeprazole 20 MG EC tablet Take 1 tablet by mouth daily 90 tablet 2    aspirin 500 MG tablet Take 500 mg by mouth every 6 hours as needed for Pain      diphenhydrAMINE (BENADRYL) 25 MG capsule Take 25 mg by mouth every 6 hours as needed for Itching      loratadine (CLARITIN) 10 MG tablet Take 10 mg by mouth daily      timolol (TIMOPTIC) 0.5 % ophthalmic solution Place 1 drop into both eyes daily       L-Lysine 1000 MG TABS Take by mouth Take one tablet on Fridays       No current facility-administered medications for this visit.      Allergies   Allergen Reactions    Clindamycin/Lincomycin Other (See Comments)     Pt had rectal bleeding while on this medication       Health Maintenance   Topic Date Due    Hepatitis C screen  Never done    DTaP/Tdap/Td vaccine (1 - Tdap) Never done    Shingles Vaccine (1 of 2) Never done    Flu vaccine (1) 09/01/2021    Breast cancer screen  01/12/2023    Cervical cancer screen  06/09/2023    Lipid screen  06/11/2026    Colon cancer screen colonoscopy  10/02/2028    COVID-19 Vaccine  Completed    HIV screen  Completed    Hepatitis A vaccine  Aged Out    Hepatitis B vaccine  Aged Out    Hib vaccine  Aged Out    Meningococcal (ACWY) vaccine  Aged Out    Pneumococcal 0-64 years Vaccine  Aged Out       Subjective:     Review of Systems   Constitutional: Negative for fever. HENT: Negative for congestion. Respiratory: Negative for cough. Skin: Positive for rash. Objective:     Physical Exam  Vitals and nursing note reviewed. Constitutional:       General: She is not in acute distress. Appearance: Normal appearance. She is not ill-appearing or toxic-appearing. HENT:      Head: Normocephalic and atraumatic. Right Ear: External ear normal.      Left Ear: External ear normal.   Eyes:      Extraocular Movements: Extraocular movements intact. Conjunctiva/sclera: Conjunctivae normal.      Pupils: Pupils are equal, round, and reactive to light. Cardiovascular:      Rate and Rhythm: Normal rate. Pulmonary:      Effort: Pulmonary effort is normal.   Musculoskeletal:         General: No swelling or signs of injury. Skin:     General: Skin is warm and dry. Findings: Rash present. Rash is macular and papular. Comments: Has noted small bug bites to forearms   Neurological:      General: No focal deficit present. Mental Status: She is alert and oriented to person, place, and time. Motor: No weakness. Psychiatric:         Mood and Affect: Mood normal.       /76   Pulse 65   Temp 97.7 °F (36.5 °C) (Temporal)   SpO2 100%     Assessment:       Diagnosis Orders   1. Contact dermatitis, unspecified contact dermatitis type, unspecified trigger  triamcinolone (KENALOG) 0.1 % cream       Plan:    No orders of the defined types were placed in this encounter. Rash does not appear viral today. Patient has history of having allergies to flea bites as child. Does have inside pet and known bed bugs in apartment. She states she kills bed bugs when she sees them. Has not had professional help; discussed that she needs to contact apartment office so they are aware and can assist in [address] Rylee VU. I wonder if this is more contact dermatitis from bug bites.  Discussed if no relief from steroid cream follow up with dermatology and she has [facility] so we are unable to refer her there. She VU. Return if symptoms worsen or fail to improve. Orders Placed This Encounter   Medications    triamcinolone (KENALOG) 0.1 % cream     Sig: Apply topically 2 times daily. Dispense:  1 Tube     Refill:  0       Patient given educational materials- see patient instructions. Discussed use, benefit, and side effects of prescribedmedications. All patient questions answered. Pt voiced understanding. Reviewedhealth maintenance. Instructed to continue current medications, diet and exercise. Patient agreed with treatment plan. Follow up as directed. Patient Instructions   1. Start using steroid cream as prescribed. 2. Contact insurance as we discussed to get into dermatology if this is not improved. 3. Contact professional  to assist in apartment as we discussed.          Electronically signed by ALEXA Harper CNP on 7/22/2021 at 9:28 AM

## 2021-08-29 DIAGNOSIS — D50.9 IRON DEFICIENCY ANEMIA, UNSPECIFIED IRON DEFICIENCY ANEMIA TYPE: ICD-10-CM

## 2021-08-29 RX ORDER — FERROUS SULFATE 325(65) MG
TABLET ORAL
Qty: 90 TABLET | Refills: 0 | Status: SHIPPED | OUTPATIENT
Start: 2021-08-29 | End: 2021-11-29

## 2021-09-02 DIAGNOSIS — K21.9 GASTROESOPHAGEAL REFLUX DISEASE, UNSPECIFIED WHETHER ESOPHAGITIS PRESENT: ICD-10-CM

## 2021-09-02 RX ORDER — OMEPRAZOLE 20 MG/1
CAPSULE, DELAYED RELEASE ORAL
Qty: 90 CAPSULE | Refills: 2 | Status: SHIPPED | OUTPATIENT
Start: 2021-09-02 | End: 2022-05-31

## 2021-09-23 ENCOUNTER — OFFICE VISIT (OUTPATIENT)
Dept: URGENT CARE | Age: 51
End: 2021-09-23
Payer: MEDICAID

## 2021-09-23 VITALS
WEIGHT: 192 LBS | BODY MASS INDEX: 31.99 KG/M2 | TEMPERATURE: 98 F | HEART RATE: 70 BPM | OXYGEN SATURATION: 100 % | SYSTOLIC BLOOD PRESSURE: 108 MMHG | HEIGHT: 65 IN | DIASTOLIC BLOOD PRESSURE: 72 MMHG

## 2021-09-23 DIAGNOSIS — J06.9 VIRAL URI: Primary | ICD-10-CM

## 2021-09-23 DIAGNOSIS — Z11.59 SCREENING FOR VIRAL DISEASE: ICD-10-CM

## 2021-09-23 LAB — SARS-COV-2, PCR: DETECTED

## 2021-09-23 PROCEDURE — 99213 OFFICE O/P EST LOW 20 MIN: CPT | Performed by: NURSE PRACTITIONER

## 2021-09-23 ASSESSMENT — ENCOUNTER SYMPTOMS
COUGH: 1
SORE THROAT: 1

## 2021-09-23 NOTE — PROGRESS NOTES
[address] [facility]  [address] [address] [place] [address]  Dept: [phone]  Dept Fax: [phone]  Loc: [phone]    Ifrah Patterson is a 46 y.o. female who presents today for her medical conditions/complaintsas noted below. Ifrah Patterson is c/o of Nasal Congestion (x2 days), Pharyngitis, and Congestion        HPI:     URI   This is a new problem. The current episode started in the past 7 days. The problem has been unchanged. There has been no fever. Associated symptoms include congestion, coughing and a sore throat. Pertinent negatives include no rash. Treatments tried: otc treatment and fluids. The treatment provided mild relief.        Past Medical History:   Diagnosis Date    Anemia     Iron deficiency    Back pain     Chronic cholecystitis without calculus 2016    Degenerative disc disease     Glaucoma (increased eye pressure)     candidate for due to high pressure/ preventative eye drops    Hepatic steatosis     Kidney stones 2019    Renal stones      Past Surgical History:   Procedure Laterality Date    CHOLECYSTECTOMY  2016    CHOLECYSTECTOMY  2016    KIDNEY STONE SURGERY      SD CYSTO/URETERO/PYELOSCOPY W/LITHOTRIPSY Left 10/31/2017    CYSTOSCOPY URETEROSCOPY stone removal STENT PLACEMENT performed by Bibi Reyes MD at [facility] History   Problem Relation Age of Onset    Substance Abuse Father     Hypertension Mother     Diabetes Mother     Heart Disease Paternal Grandmother     Breast Cancer Maternal Aunt     Stomach Cancer Maternal Aunt        Social History     Tobacco Use    Smoking status: Former Smoker     Packs/day: 0.50     Years: 5.00     Pack years: 2.50     Types: Cigarettes     Quit date: 2004     Years since quittin.7    Smokeless tobacco: Never Used   Substance Use Topics    Alcohol use: No      Current Outpatient Medications   Medication Sig Dispense Refill    omeprazole (PRILOSEC) 20 MG delayed release capsule TAKE 1 CAPSULE BY MOUTH EVERY DAY 90 capsule 2    ferrous sulfate (IRON 325) 325 (65 Fe) MG tablet TAKE 1 TABLET BY MOUTH EVERY DAY 90 tablet 0    atomoxetine (STRATTERA) 10 MG capsule Take 10 mg by mouth daily      cholestyramine (QUESTRAN) 4 g packet TAKE 1 PACKET BY MOUTH MIXED IN JUICE 2 TIMES DAILY (WITH MEALS) 60 packet 5    [employer] D3 50 MCG (2000 UT) CAPS TAKE 1 CAPSULE BY MOUTH EVERY DAY 90 capsule 3    aspirin 500 MG tablet Take 500 mg by mouth every 6 hours as needed for Pain      diphenhydrAMINE (BENADRYL) 25 MG capsule Take 25 mg by mouth every 6 hours as needed for Itching      loratadine (CLARITIN) 10 MG tablet Take 10 mg by mouth daily      timolol (TIMOPTIC) 0.5 % ophthalmic solution Place 1 drop into both eyes daily       L-Lysine 1000 MG TABS Take by mouth Take one tablet on Fridays      triamcinolone (KENALOG) 0.1 % cream Apply topically 2 times daily. (Patient not taking: Reported on 9/23/2021) 1 Tube 0    clotrimazole-betamethasone (LOTRISONE) 1-0.05 % cream Apply topically 2 times daily. (Patient not taking: Reported on 9/23/2021) 45 g 0     No current facility-administered medications for this visit.      Allergies   Allergen Reactions    Clindamycin/Lincomycin Other (See Comments)     Pt had rectal bleeding while on this medication       Health Maintenance   Topic Date Due    Hepatitis C screen  Never done    DTaP/Tdap/Td vaccine (1 - Tdap) Never done    Shingles Vaccine (1 of 2) Never done    Cervical cancer screen  06/10/2020    Breast cancer screen  01/12/2023    Lipid screen  06/11/2026    Colon cancer screen colonoscopy  10/02/2028    Flu vaccine  Completed    COVID-19 Vaccine  Completed    HIV screen  Completed    Hepatitis A vaccine  Aged Out    Hepatitis B vaccine  Aged Out    Hib vaccine  Aged Out    Meningococcal (ACWY) vaccine  Aged Out    Pneumococcal 0-64 years Vaccine  Aged Out       Subjective:     Review of Systems Constitutional: Negative for chills and fever. HENT: Positive for congestion and sore throat. Respiratory: Positive for cough. Skin: Negative for rash. All other systems reviewed and are negative.      :Objective      Physical Exam  Vitals and nursing note reviewed. Constitutional:       General: She is not in acute distress. Appearance: Normal appearance. She is well-developed. She is ill-appearing. She is not diaphoretic. HENT:      Head: Normocephalic and atraumatic. Right Ear: Tympanic membrane, ear canal and external ear normal.      Left Ear: Tympanic membrane, ear canal and external ear normal.      Nose: Nose normal.      Mouth/Throat:      Mouth: Mucous membranes are moist.      Pharynx: Oropharynx is clear. No posterior oropharyngeal erythema. Eyes:      Conjunctiva/sclera: Conjunctivae normal.      Pupils: Pupils are equal, round, and reactive to light. Cardiovascular:      Rate and Rhythm: Normal rate and regular rhythm. Heart sounds: Normal heart sounds. No murmur heard. Pulmonary:      Effort: Pulmonary effort is normal. No respiratory distress. Breath sounds: Normal breath sounds. No wheezing. Musculoskeletal:      Cervical back: Normal range of motion. Skin:     General: Skin is warm and dry. Findings: No rash. Neurological:      Mental Status: She is alert and oriented to person, place, and time. Psychiatric:         Mood and Affect: Mood normal.         Behavior: Behavior normal.       /72   Pulse 70   Temp 98 °F (36.7 °C) (Temporal)   Ht 5' 5\" (1.651 m)   Wt 192 lb (87.1 kg)   SpO2 100%   BMI 31.95 kg/m²     :Assessment       Diagnosis Orders   1. Viral URI     2. Screening for viral disease  COVID-19       :Plan    1. Rest and increase fluid intake. 2. Covid-19 testing. Quarantine at home until results are called to you. If positive you will have to quarantine for 10 days. If positive the health dept will also contact you.   3. infection of the nose, sinuses, or throat. URIs are spread by coughs, sneezes, and direct contact. The common cold is the most frequent kind of URI. The flu and sinus infections are other kinds of URIs. Almost all URIs are caused by viruses. Antibiotics won't cure them. But you can treat most infections with home care. This may include drinking lots of fluids and taking over-the-counter pain medicine. You will probably feel better in 4 to 10 days. The doctor has checked you carefully, but problems can develop later. If you notice any problems or new symptoms, get medical treatment right away. Follow-up care is a key part of your treatment and safety. Be sure to make and go to all appointments, and call your doctor if you are having problems. It's also a good idea to know your test results and keep a list of the medicines you take. How can you care for yourself at home? · To prevent dehydration, drink plenty of fluids. Choose water and other clear liquids until you feel better. If you have kidney, heart, or liver disease and have to limit fluids, talk with your doctor before you increase the amount of fluids you drink. · Take an over-the-counter pain medicine, such as acetaminophen (Tylenol), ibuprofen (Advil, Motrin), or naproxen (Aleve). Read and follow all instructions on the label. · Before you use cough and cold medicines, check the label. These medicines may not be safe for young children or for people with certain health problems. · Be careful when taking over-the-counter cold or flu medicines and Tylenol at the same time. Many of these medicines have acetaminophen, which is Tylenol. Read the labels to make sure that you are not taking more than the recommended dose. Too much acetaminophen (Tylenol) can be harmful. · Get plenty of rest.  · Do not smoke or allow others to smoke around you. If you need help quitting, talk to your doctor about stop-smoking programs and medicines.  These can increase your chances of quitting for good. When should you call for help? Call 911 anytime you think you may need emergency care. For example, call if:    · You have severe trouble breathing. Call your doctor now or seek immediate medical care if:    · You seem to be getting much sicker.     · You have new or worse trouble breathing.     · You have a new or higher fever.     · You have a new rash. Watch closely for changes in your health, and be sure to contact your doctor if:    · You have a new symptom, such as a sore throat, an earache, or sinus pain.     · You cough more deeply or more often, especially if you notice more mucus or a change in the color of your mucus.     · You do not get better as expected. Where can you learn more? Go to https://PowerMessage.Fiksu. org and sign in to your [employer] account. Enter K282 in the [employer] box to learn more about \"Upper Respiratory Infection (Cold): Care Instructions. \"     If you do not have an account, please click on the \"Sign Up Now\" link. Current as of: July 6, 2021               Content Version: 13.0  © 6909-8133 [employer]. Care instructions adapted under license by [employer]. If you have questions about a medical condition or this instruction, always ask your healthcare professional. [address] any warranty or liability for your use of this information. Patient Education        Learning About Coronavirus [phone])  What is coronavirus (COVID-19)? COVID-19 is a disease caused by a type of coronavirus. This illness was first found in December 2019. It has since spread worldwide. Coronaviruses are a large group of viruses. They cause the common cold. They also cause more serious illnesses like [place] respiratory syndrome (MERS) and severe acute respiratory syndrome (SARS). COVID-19 is caused by a novel coronavirus.  That means it's a new type that has not been seen in people before. What are the symptoms? COVID-19 symptoms may include:  · Fever. · Cough. · Trouble breathing. · Chills or repeated shaking with chills. · Muscle and body aches. · Headache. · Sore throat. · New loss of taste or smell. · Vomiting. · Diarrhea. In severe cases, COVID-19 can cause pneumonia and make it hard to breathe without help from a machine. It can cause death. How is it diagnosed? COVID-19 is diagnosed with a viral test. This may also be called a PCR test or antigen test. It looks for evidence of the virus in your breathing passages or lungs (respiratory system). The test is most often done on a sample from the nose, throat, or lungs. It's sometimes done on a sample of saliva. One way a sample is collected is by putting a long swab into the back of your nose. How is it treated? Mild cases of COVID-19 can be treated at home. Serious cases need treatment in the hospital. Treatment may include medicines to reduce symptoms, plus breathing support such as oxygen therapy or a ventilator. Some people may be placed on their belly to help their oxygen levels. Treatments that may help people who have COVID-19 include:  Antiviral medicines. These medicines treat viral infections. Remdesivir is an example. Immune-based therapy. These medicines help the immune system fight COVID-19. Examples include monoclonal antibodies. Blood thinners. These medicines help prevent blood clots. People with severe illness are at risk for blood clots. How can you protect yourself and others? The best way to protect yourself from getting sick is to:  · Get vaccinated. · Avoid sick people. · If you are not fully vaccinated:  ? Wear a mask if you have to go to public areas. ? Avoid crowds and try to stay at least 6 feet away from other people. · Cover your mouth with a tissue when you cough or sneeze. · Wash your hands often, especially after you cough or sneeze.  Use soap and water, and scrub for at least 20 seconds. If soap and water aren't available, use an alcohol-based hand . · Avoid touching your mouth, nose, and eyes. To help avoid spreading the virus to others:  · Get vaccinated. · Cover your mouth with a tissue when you cough or sneeze. · Wash your hands often, especially after you cough or sneeze. Use soap and water, and scrub for at least 20 seconds. If soap and water aren't available, use an alcohol-based hand . · If you have been exposed to the virus and are not fully vaccinated:  ? Stay home. Don't go to school, work, or public areas. And don't use public transportation, ride-shares, or taxis unless you have no choice. ? Wear a mask if you have to go to public areas, like the pharmacy. · If you're sick:  ? Leave your home only if you need to get medical care. But call the doctor's office first so they know you're coming. And wear a mask. ? Wear a mask whenever you're around other people. ? Limit contact with pets and people in your home. If possible, stay in a separate bedroom and use a separate bathroom. ? Clean and disinfect your home every day. Use household  and disinfectant wipes or sprays. Take special care to clean things that you touch with your hands. How can you self-isolate when you have COVID-19? If you have COVID-19, there are things you can do to help avoid spreading the virus to others. · Limit contact with people in your home. If possible, stay in a separate bedroom and use a separate bathroom. · Wear a mask when you are around other people. · If you have to leave home, avoid crowds and try to stay at least 6 feet away from other people. · Avoid contact with pets and other animals. · Cover your mouth and nose with a tissue when you cough or sneeze. Then throw it in the trash right away. · Wash your hands often, especially after you cough or sneeze. Use soap and water, and scrub for at least 20 seconds.  If soap and water aren't available, use an alcohol-based hand . · Don't share personal household items. These include bedding, towels, cups and glasses, and eating utensils. · [address] in the warmest water allowed for the fabric type, and dry it completely. It's okay to wash other people's laundry with yours. · Clean and disinfect your home. Use household  and disinfectant wipes or sprays. When should you call for help? Call 911 anytime you think you may need emergency care. For example, call if you have life-threatening symptoms, such as:    · You have severe trouble breathing. (You can't talk at all.)     · You have constant chest pain or pressure.     · You are severely dizzy or lightheaded.     · You are confused or can't think clearly.     · You have pale, gray, or blue-colored skin or lips.     · You pass out (lose consciousness) or are very hard to wake up. Call your doctor now or seek immediate medical care if:    · You have moderate trouble breathing. (You can't speak a full sentence.)     · You are coughing up blood (more than about 1 teaspoon).     · You have signs of low blood pressure. These include feeling lightheaded; being too weak to stand; and having cold, pale, clammy skin. Watch closely for changes in your health, and be sure to contact your doctor if:    · Your symptoms get worse.     · You are not getting better as expected.     · You have new or worse symptoms of anxiety, depression, nightmares, or flashbacks. Call before you go to the doctor's office. Follow their instructions. And wear a mask. Current as of: July 1, 2021               Content Version: 13.0  © 2006-2021 [employer]. Care instructions adapted under license by [address] If you have questions about a medical condition or this instruction, always ask your healthcare professional. [address] [address] any warranty or liability for your use of this information. 1. Rest and increase fluid intake.    2. Covid-19 testing. Quarantine at home until results are called to you. If positive you will have to quarantine for 10 days. If positive the health dept will also contact you. 3. Monitor for fever and treat as needed with tylenol or ibuprofen  4. If patient is not improving or developing any new/worsening symptoms then return to clinic as needed or go to ER. Patient is to follow up with PCP as needed.                Electronically signed by ALEXA Ramos on 9/23/2021 at 11:24 AM

## 2021-09-23 NOTE — PATIENT INSTRUCTIONS
Patient Education        Upper Respiratory Infection (Cold): Care Instructions  Your Care Instructions     An upper respiratory infection, or URI, is an infection of the nose, sinuses, or throat. URIs are spread by coughs, sneezes, and direct contact. The common cold is the most frequent kind of URI. The flu and sinus infections are other kinds of URIs. Almost all URIs are caused by viruses. Antibiotics won't cure them. But you can treat most infections with home care. This may include drinking lots of fluids and taking over-the-counter pain medicine. You will probably feel better in 4 to 10 days. The doctor has checked you carefully, but problems can develop later. If you notice any problems or new symptoms, get medical treatment right away. Follow-up care is a key part of your treatment and safety. Be sure to make and go to all appointments, and call your doctor if you are having problems. It's also a good idea to know your test results and keep a list of the medicines you take. How can you care for yourself at home? · To prevent dehydration, drink plenty of fluids. Choose water and other clear liquids until you feel better. If you have kidney, heart, or liver disease and have to limit fluids, talk with your doctor before you increase the amount of fluids you drink. · Take an over-the-counter pain medicine, such as acetaminophen (Tylenol), ibuprofen (Advil, Motrin), or naproxen (Aleve). Read and follow all instructions on the label. · Before you use cough and cold medicines, check the label. These medicines may not be safe for young children or for people with certain health problems. · Be careful when taking over-the-counter cold or flu medicines and Tylenol at the same time. Many of these medicines have acetaminophen, which is Tylenol. Read the labels to make sure that you are not taking more than the recommended dose. Too much acetaminophen (Tylenol) can be harmful.   · Get plenty of rest.  · Do not smoke or allow others to smoke around you. If you need help quitting, talk to your doctor about stop-smoking programs and medicines. These can increase your chances of quitting for good. When should you call for help? Call 911 anytime you think you may need emergency care. For example, call if:    · You have severe trouble breathing. Call your doctor now or seek immediate medical care if:    · You seem to be getting much sicker.     · You have new or worse trouble breathing.     · You have a new or higher fever.     · You have a new rash. Watch closely for changes in your health, and be sure to contact your doctor if:    · You have a new symptom, such as a sore throat, an earache, or sinus pain.     · You cough more deeply or more often, especially if you notice more mucus or a change in the color of your mucus.     · You do not get better as expected. Where can you learn more? Go to https://Customer BOOM (formerly Renter[employer]OM).[employer]. org and sign in to your Ocelus account. Enter C558 in the NatureBridge box to learn more about \"Upper Respiratory Infection (Cold): Care Instructions. \"     If you do not have an account, please click on the \"Sign Up Now\" link. Current as of: July 6, 2021               Content Version: 13.0  © 2006-2021 Healthwise, Incorporated. Care instructions adapted under license by [facility] ([facility]). If you have questions about a medical condition or this instruction, always ask your healthcare professional. [place] [address] any warranty or liability for your use of this information. Patient Education        Learning About Coronavirus [phone])  What is coronavirus (COVID-19)? COVID-19 is a disease caused by a type of coronavirus. This illness was first found in December 2019. It has since spread worldwide. Coronaviruses are a large group of viruses. They cause the common cold.  They also cause more serious illnesses like [place] respiratory syndrome (MERS) mouth with a tissue when you cough or sneeze. · Wash your hands often, especially after you cough or sneeze. Use soap and water, and scrub for at least 20 seconds. If soap and water aren't available, use an alcohol-based hand . · Avoid touching your mouth, nose, and eyes. To help avoid spreading the virus to others:  · Get vaccinated. · Cover your mouth with a tissue when you cough or sneeze. · Wash your hands often, especially after you cough or sneeze. Use soap and water, and scrub for at least 20 seconds. If soap and water aren't available, use an alcohol-based hand . · If you have been exposed to the virus and are not fully vaccinated:  ? Stay home. Don't go to school, work, or public areas. And don't use public transportation, ride-shares, or taxis unless you have no choice. ? Wear a mask if you have to go to public areas, like the pharmacy. · If you're sick:  ? Leave your home only if you need to get medical care. But call the doctor's office first so they know you're coming. And wear a mask. ? Wear a mask whenever you're around other people. ? Limit contact with pets and people in your home. If possible, stay in a separate bedroom and use a separate bathroom. ? Clean and disinfect your home every day. Use household  and disinfectant wipes or sprays. Take special care to clean things that you touch with your hands. How can you self-isolate when you have COVID-19? If you have COVID-19, there are things you can do to help avoid spreading the virus to others. · Limit contact with people in your home. If possible, stay in a separate bedroom and use a separate bathroom. · Wear a mask when you are around other people. · If you have to leave home, avoid crowds and try to stay at least 6 feet away from other people. · Avoid contact with pets and other animals. · Cover your mouth and nose with a tissue when you cough or sneeze. Then throw it in the trash right away.   · Wash your hands often, especially after you cough or sneeze. Use soap and water, and scrub for at least 20 seconds. If soap and water aren't available, use an alcohol-based hand . · Don't share personal household items. These include bedding, towels, cups and glasses, and eating utensils. · [address] in the warmest water allowed for the fabric type, and dry it completely. It's okay to wash other people's laundry with yours. · Clean and disinfect your home. Use household  and disinfectant wipes or sprays. When should you call for help? Call 911 anytime you think you may need emergency care. For example, call if you have life-threatening symptoms, such as:    · You have severe trouble breathing. (You can't talk at all.)     · You have constant chest pain or pressure.     · You are severely dizzy or lightheaded.     · You are confused or can't think clearly.     · You have pale, gray, or blue-colored skin or lips.     · You pass out (lose consciousness) or are very hard to wake up. Call your doctor now or seek immediate medical care if:    · You have moderate trouble breathing. (You can't speak a full sentence.)     · You are coughing up blood (more than about 1 teaspoon).     · You have signs of low blood pressure. These include feeling lightheaded; being too weak to stand; and having cold, pale, clammy skin. Watch closely for changes in your health, and be sure to contact your doctor if:    · Your symptoms get worse.     · You are not getting better as expected.     · You have new or worse symptoms of anxiety, depression, nightmares, or flashbacks. Call before you go to the doctor's office. Follow their instructions. And wear a mask. Current as of: July 1, 2021               Content Version: 13.0  © 2006-2021 Healthwise, Incorporated. Care instructions adapted under license by [facility] ([facility]).  If you have questions about a medical condition or this instruction, always ask your healthcare professional. [address] any warranty or liability for your use of this information. 1. Rest and increase fluid intake. 2. Covid-19 testing. Quarantine at home until results are called to you. If positive you will have to quarantine for 10 days. If positive the health dept will also contact you. 3. Monitor for fever and treat as needed with tylenol or ibuprofen  4. If patient is not improving or developing any new/worsening symptoms then return to clinic as needed or go to ER. Patient is to follow up with PCP as needed.

## 2021-11-02 NOTE — PROGRESS NOTES
6.41.  Labs drawn 1/15/19 included:  CMP, normal   Iron 78, TIBC 389, iron saturation 20%, ferritin 19   reticulocyte count 1.3   B12 382, folate 13.3   SPEP, no M spike   IgA elevated at 490, IgG and IgM normal   vWB factor antigen, normal at 99   VWB factor activity, normal at 73  CBC at follow-up on 2/12/19 was improved with a hemoglobin of 12.0 with MCV 83.3 and platelet count 610,413. WBC was 5.07. Endoscopy 2/19/2019 by Dr. Phillip Soni showed mild gastritis found. There were  no features of celiac disease. Consider GYN evaluation regarding heavy menstrual cycles possibly a contributor of  deficiency. Elevated IgA will be followed peripherally.       Past Medical History:   Diagnosis Date    Anemia     Iron deficiency    Back pain     Chronic cholecystitis without calculus 6/6/2016    Degenerative disc disease     Glaucoma (increased eye pressure)     candidate for due to high pressure/ preventative eye drops    Hepatic steatosis     Kidney stones 9/27/2019    Renal stones      Past Surgical History:   Procedure Laterality Date    CHOLECYSTECTOMY  06/06/2016    IN CYSTO/URETERO/PYELOSCOPY W/LITHOTRIPSY Left 10/31/2017    CYSTOSCOPY URETEROSCOPY stone removal STENT PLACEMENT performed by Travis [facility] at [facility] OR     Current Outpatient Medications   Medication Sig Dispense Refill    omeprazole (PRILOSEC) 20 MG delayed release capsule TAKE 1 CAPSULE BY MOUTH EVERY DAY 90 capsule 2    ferrous sulfate (IRON 325) 325 (65 Fe) MG tablet TAKE 1 TABLET BY MOUTH EVERY DAY 90 tablet 0    atomoxetine (STRATTERA) 10 MG capsule Take 10 mg by mouth daily      cholestyramine (QUESTRAN) 4 g packet TAKE 1 PACKET BY MOUTH MIXED IN JUICE 2 TIMES DAILY (WITH MEALS) 60 packet 5    [employer]S D3 50 MCG (2000 UT) CAPS TAKE 1 CAPSULE BY MOUTH EVERY DAY 90 capsule 3    aspirin 500 MG tablet Take 500 mg by mouth every 6 hours as needed for Pain      diphenhydrAMINE (BENADRYL) 25 MG capsule Take 25 mg by mouth every 6 hours as needed for Itching      loratadine (CLARITIN) 10 MG tablet Take 10 mg by mouth daily      timolol (TIMOPTIC) 0.5 % ophthalmic solution Place 1 drop into both eyes daily       L-Lysine 1000 MG TABS Take by mouth Take one tablet on        No current facility-administered medications for this visit. Allergies   Allergen Reactions    Clindamycin/Lincomycin Other (See Comments)     Pt had rectal bleeding while on this medication     Social History     Tobacco Use    Smoking status: Former Smoker     Packs/day: 0.50     Years: 5.00     Pack years: 2.50     Types: Cigarettes     Quit date: 2004     Years since quittin.8    Smokeless tobacco: Never Used   Substance Use Topics    Alcohol use: No    Drug use: No     Family History   Problem Relation Age of Onset    Substance Abuse Father     Hypertension Mother     Diabetes Mother     Heart Disease Paternal Grandmother     Breast Cancer Maternal Aunt     Stomach Cancer Maternal Aunt        Review of Systems   Constitutional: Negative for fatigue and fever. HENT: Negative for dental problem, hearing loss, mouth sores, nosebleeds, sore throat and trouble swallowing. Eyes: Negative for discharge and itching. Respiratory: Negative for cough, shortness of breath and wheezing. No hemoptysis   Cardiovascular: Negative for chest pain, palpitations and leg swelling. Gastrointestinal: Negative for abdominal pain, constipation, diarrhea, nausea and vomiting. GERD, stable   Endocrine: Negative for cold intolerance, heat intolerance, polydipsia and polyuria. Genitourinary: Negative for dysuria, frequency, hematuria and urgency. Musculoskeletal: Positive for arthralgias. Negative for joint swelling and myalgias. Skin: Negative for pallor and rash. Allergic/Immunologic: Positive for environmental allergies (stable). Negative for immunocompromised state. Neurological: Negative for seizures, syncope and numbness. Hematological: Negative for adenopathy. Does not bruise/bleed easily. Psychiatric/Behavioral: Negative for agitation, behavioral problems and confusion. The patient is not nervous/anxious. Physical Exam  Vitals reviewed. Constitutional:       General: She is not in acute distress. Appearance: She is well-developed. She is not toxic-appearing or diaphoretic. HENT:      Head: Normocephalic and atraumatic. Right Ear: External ear normal.      Left Ear: External ear normal.      Nose: Nose normal.      Mouth/Throat:      Mouth: Mucous membranes are moist.   Eyes:      General: No scleral icterus. Right eye: No discharge. Left eye: No discharge. Conjunctiva/sclera: Conjunctivae normal.      Comments: Wears glasses   Neck:      Trachea: No tracheal deviation. Cardiovascular:      Rate and Rhythm: Normal rate and regular rhythm. Heart sounds: No murmur heard. Pulmonary:      Effort: Pulmonary effort is normal. No respiratory distress. Breath sounds: Normal breath sounds. No wheezing or rales. Abdominal:      General: Bowel sounds are normal. There is no distension. Palpations: Abdomen is soft. Tenderness: There is no abdominal tenderness. There is no guarding. Genitourinary:     Comments: Exam deferred  Musculoskeletal:         General: No tenderness or deformity. Cervical back: Neck supple. Comments: Normal ROM all four extremities   Lymphadenopathy:      Cervical: No cervical adenopathy. Right cervical: No superficial cervical adenopathy. Left cervical: No superficial cervical adenopathy. Upper Body:      Right upper body: No supraclavicular adenopathy. Left upper body: No supraclavicular adenopathy. Comments:      Skin:     General: Skin is warm and dry. Findings: No rash. Comments: Hair thinning   Neurological:      Mental Status: She is alert and oriented to person, place, and time.       Comments: follows commands, non-focal   Psychiatric:         Behavior: Behavior normal. Behavior is cooperative. Thought Content: Thought content normal.         Judgment: Judgment normal.      Comments: Alert and oriented to person, place and time. Vitals:    21 0915   BP: 124/78   Pulse: 51   SpO2: 99%   Weight: 192 lb (87.1 kg)   Height: 5' 5\" (1.651 m)      Wt Readings from Last 3 Encounters:   21 192 lb (87.1 kg)   21 192 lb (87.1 kg)   21 187 lb 3.2 oz (84.9 kg)     LABS reviewed/analyzed by me:     Labs 2020:  · CMP: Creatinine: 0.7, GFR: >60, calcium: 9.6, Total Protein: 7.0  · SPEP: No m-spike  · Immunofixation: no monoclonality detected  · Ig, IgA: 503, IgM: 55  · Iron: 78, TIBC: 422, Iron saturation: 18  · Ferritin: 20.3    CBC:  Lab Results   Component Value Date    WBC 6.04 2021    HGB 13.2 2021    HCT 40.1 2021    MCV 91.8 2021     (L) 2021    LABLYMP 1.66 2013    LYMPHOPCT 31.8 2021    RBC 4.37 2021    MCH 30.2 2021    MCHC 32.9 2021    RDW 13.0 2021     ASSESSMENT/PLAN:    1. Iron deficiency anemia, unspecified iron deficiency anemia type    2. High total serum IgA       Iron deficiency anemia  Hgb 13.2, MCV 91.8. Platelets 619,403  Continues ferrous sulfate 325 mg daily   Repeat labs today  Colonoscopy up-to-date last completed 10/2018    Elevated IgA, stable    Stable, repeat labs today    Cancer Screenings  · M[facility] scheduled 2021 at [facility]   · Pap-smear 2020 by ZARI Naqvi was negative  · Colonoscopy 10/02/2018 by Dr. Emily Otto was normal.  Defer arrangements for health maintenance screening to PCP.     Orders Placed This Encounter   Procedures    Iron and TIBC    Ferritin    Electrophoresis Protein, Serum with Reflex to Immunofixation    [place]/Lambda Free Lt Chains, Serum Quant     Heme stable  Return in about 1 year (around 2022) for follow up with [place] [facility]. I have seen, examined and reviewed patient medication list, appropriate labs and imaging studies. Relevant medical records and other physician notes have been reviewed. I answered all questions to the best of my knowledge and to the patient's satisfaction. Dictated utilizing Dragon transcription software.          ALEXA Romo  12:33 PM  11/4/2021

## 2021-11-03 DIAGNOSIS — D50.9 IRON DEFICIENCY ANEMIA, UNSPECIFIED IRON DEFICIENCY ANEMIA TYPE: Primary | ICD-10-CM

## 2021-11-04 ENCOUNTER — HOSPITAL ENCOUNTER (OUTPATIENT)
Dept: INFUSION THERAPY | Age: 51
Discharge: HOME | End: 2021-11-04
Payer: MEDICAID

## 2021-11-04 ENCOUNTER — OFFICE VISIT (OUTPATIENT)
Dept: HEMATOLOGY | Age: 51
End: 2021-11-04
Payer: MEDICAID

## 2021-11-04 VITALS
OXYGEN SATURATION: 99 % | WEIGHT: 192 LBS | BODY MASS INDEX: 31.99 KG/M2 | SYSTOLIC BLOOD PRESSURE: 124 MMHG | DIASTOLIC BLOOD PRESSURE: 78 MMHG | HEART RATE: 51 BPM | HEIGHT: 65 IN

## 2021-11-04 DIAGNOSIS — D50.9 IRON DEFICIENCY ANEMIA, UNSPECIFIED IRON DEFICIENCY ANEMIA TYPE: Primary | ICD-10-CM

## 2021-11-04 DIAGNOSIS — D50.9 IRON DEFICIENCY ANEMIA, UNSPECIFIED IRON DEFICIENCY ANEMIA TYPE: ICD-10-CM

## 2021-11-04 DIAGNOSIS — R76.8 HIGH TOTAL SERUM IGA: ICD-10-CM

## 2021-11-04 LAB
BASOPHILS ABSOLUTE: 0.01 K/UL (ref 0.01–0.08)
BASOPHILS RELATIVE PERCENT: 0.2 % (ref 0.1–1.2)
EOSINOPHILS ABSOLUTE: 0.06 K/UL (ref 0.04–0.54)
EOSINOPHILS RELATIVE PERCENT: 1 % (ref 0.7–7)
FERRITIN: 22.4 NG/ML (ref 11.1–264)
HCT VFR BLD CALC: 40.1 % (ref 34.1–44.9)
HEMOGLOBIN: 13.2 G/DL (ref 11.2–15.7)
IRON SATURATION: 17 % (ref 14–50)
IRON: 59 UG/DL (ref 37–170)
LYMPHOCYTES ABSOLUTE: 1.92 K/UL (ref 1.18–3.74)
LYMPHOCYTES RELATIVE PERCENT: 31.8 % (ref 19.3–53.1)
MCH RBC QN AUTO: 30.2 PG (ref 25.6–32.2)
MCHC RBC AUTO-ENTMCNC: 32.9 G/DL (ref 32.3–35.5)
MCV RBC AUTO: 91.8 FL (ref 79.4–94.8)
MONOCYTES ABSOLUTE: 0.57 K/UL (ref 0.24–0.82)
MONOCYTES RELATIVE PERCENT: 9.4 % (ref 4.7–12.5)
NEUTROPHILS ABSOLUTE: 3.48 K/UL (ref 1.56–6.13)
NEUTROPHILS RELATIVE PERCENT: 57.6 % (ref 34–71.1)
PDW BLD-RTO: 13 % (ref 11.7–14.4)
PLATELET # BLD: 174 K/UL (ref 182–369)
PMV BLD AUTO: 10.8 FL (ref 7.4–10.4)
RBC # BLD: 4.37 M/UL (ref 3.93–5.22)
TOTAL IRON BINDING CAPACITY: 340 UG/DL (ref 265–497)
WBC # BLD: 6.04 K/UL (ref 3.98–10.04)

## 2021-11-04 PROCEDURE — 99213 OFFICE O/P EST LOW 20 MIN: CPT | Performed by: NURSE PRACTITIONER

## 2021-11-04 PROCEDURE — 83550 IRON BINDING TEST: CPT

## 2021-11-04 PROCEDURE — 36415 COLL VENOUS BLD VENIPUNCTURE: CPT

## 2021-11-04 PROCEDURE — 82728 ASSAY OF FERRITIN: CPT

## 2021-11-04 PROCEDURE — 83540 ASSAY OF IRON: CPT

## 2021-11-04 PROCEDURE — 99212 OFFICE O/P EST SF 10 MIN: CPT

## 2021-11-04 PROCEDURE — 85025 COMPLETE CBC W/AUTO DIFF WBC: CPT

## 2021-11-04 ASSESSMENT — ENCOUNTER SYMPTOMS
NAUSEA: 0
EYE DISCHARGE: 0
EYE ITCHING: 0
SHORTNESS OF BREATH: 0
WHEEZING: 0
CONSTIPATION: 0
SORE THROAT: 0
VOMITING: 0
TROUBLE SWALLOWING: 0
DIARRHEA: 0
ABDOMINAL PAIN: 0
COUGH: 0

## 2021-11-05 ENCOUNTER — HOSPITAL ENCOUNTER (OUTPATIENT)
Dept: WOMENS IMAGING | Age: 51
Discharge: HOME | End: 2021-11-05
Payer: MEDICAID

## 2021-11-05 DIAGNOSIS — R92.2 BREAST DENSITY: ICD-10-CM

## 2021-11-05 PROCEDURE — 77066 DX MAMMO INCL CAD BI: CPT

## 2021-11-28 DIAGNOSIS — D50.9 IRON DEFICIENCY ANEMIA, UNSPECIFIED IRON DEFICIENCY ANEMIA TYPE: ICD-10-CM

## 2021-11-29 RX ORDER — FERROUS SULFATE 325(65) MG
TABLET ORAL
Qty: 30 TABLET | Refills: 2 | Status: SHIPPED | OUTPATIENT
Start: 2021-11-29 | End: 2022-03-07

## 2021-12-12 RX ORDER — CHOLESTYRAMINE 4 G/9G
POWDER, FOR SUSPENSION ORAL
Qty: 60 PACKET | Refills: 5 | Status: SHIPPED | OUTPATIENT
Start: 2021-12-12 | End: 2022-06-09

## 2022-03-07 DIAGNOSIS — D50.9 IRON DEFICIENCY ANEMIA, UNSPECIFIED IRON DEFICIENCY ANEMIA TYPE: ICD-10-CM

## 2022-03-07 RX ORDER — FERROUS SULFATE 325(65) MG
TABLET ORAL
Qty: 30 TABLET | Refills: 2 | Status: SHIPPED | OUTPATIENT
Start: 2022-03-07 | End: 2022-06-10

## 2022-05-25 DIAGNOSIS — E55.9 VITAMIN D DEFICIENCY: ICD-10-CM

## 2022-05-25 RX ORDER — ACETAMINOPHEN 160 MG
TABLET,DISINTEGRATING ORAL
Qty: 90 CAPSULE | Refills: 3 | Status: SHIPPED | OUTPATIENT
Start: 2022-05-25

## 2022-05-28 DIAGNOSIS — K21.9 GASTROESOPHAGEAL REFLUX DISEASE, UNSPECIFIED WHETHER ESOPHAGITIS PRESENT: ICD-10-CM

## 2022-05-31 RX ORDER — OMEPRAZOLE 20 MG/1
CAPSULE, DELAYED RELEASE ORAL
Qty: 90 CAPSULE | Refills: 2 | Status: SHIPPED | OUTPATIENT
Start: 2022-05-31

## 2022-06-07 ENCOUNTER — TELEPHONE (OUTPATIENT)
Dept: FAMILY MEDICINE CLINIC | Age: 52
End: 2022-06-07

## 2022-06-07 DIAGNOSIS — Z00.00 HEALTHCARE MAINTENANCE: ICD-10-CM

## 2022-06-07 DIAGNOSIS — E55.9 VITAMIN D DEFICIENCY: ICD-10-CM

## 2022-06-07 LAB
ALBUMIN SERPL-MCNC: 4.1 G/DL (ref 3.5–5.2)
ALP BLD-CCNC: 90 U/L (ref 35–104)
ALT SERPL-CCNC: 28 U/L (ref 5–33)
ANION GAP SERPL CALCULATED.3IONS-SCNC: 10 MMOL/L (ref 7–19)
AST SERPL-CCNC: 21 U/L (ref 5–32)
BASOPHILS ABSOLUTE: 0 K/UL (ref 0–0.2)
BASOPHILS RELATIVE PERCENT: 0.2 % (ref 0–1)
BILIRUB SERPL-MCNC: 0.3 MG/DL (ref 0.2–1.2)
BUN BLDV-MCNC: 9 MG/DL (ref 6–20)
CALCIUM SERPL-MCNC: 9.4 MG/DL (ref 8.6–10)
CHLORIDE BLD-SCNC: 102 MMOL/L (ref 98–111)
CHOLESTEROL, TOTAL: 226 MG/DL (ref 160–199)
CO2: 29 MMOL/L (ref 22–29)
CREAT SERPL-MCNC: 0.7 MG/DL (ref 0.5–0.9)
EOSINOPHILS ABSOLUTE: 0 K/UL (ref 0–0.6)
EOSINOPHILS RELATIVE PERCENT: 0.6 % (ref 0–5)
GFR AFRICAN AMERICAN: >59
GFR NON-AFRICAN AMERICAN: >60
GLUCOSE BLD-MCNC: 103 MG/DL (ref 74–109)
HCT VFR BLD CALC: 43.8 % (ref 37–47)
HDLC SERPL-MCNC: 58 MG/DL (ref 65–121)
HEMOGLOBIN: 14 G/DL (ref 12–16)
IMMATURE GRANULOCYTES #: 0 K/UL
LDL CHOLESTEROL CALCULATED: 135 MG/DL
LYMPHOCYTES ABSOLUTE: 1.5 K/UL (ref 1.1–4.5)
LYMPHOCYTES RELATIVE PERCENT: 28.5 % (ref 20–40)
MCH RBC QN AUTO: 29.2 PG (ref 27–31)
MCHC RBC AUTO-ENTMCNC: 32 G/DL (ref 33–37)
MCV RBC AUTO: 91.3 FL (ref 81–99)
MONOCYTES ABSOLUTE: 0.4 K/UL (ref 0–0.9)
MONOCYTES RELATIVE PERCENT: 7.5 % (ref 0–10)
NEUTROPHILS ABSOLUTE: 3.2 K/UL (ref 1.5–7.5)
NEUTROPHILS RELATIVE PERCENT: 62.8 % (ref 50–65)
PDW BLD-RTO: 13.1 % (ref 11.5–14.5)
PLATELET # BLD: 205 K/UL (ref 130–400)
PMV BLD AUTO: 10.9 FL (ref 9.4–12.3)
POTASSIUM SERPL-SCNC: 3.9 MMOL/L (ref 3.5–5)
RBC # BLD: 4.8 M/UL (ref 4.2–5.4)
SODIUM BLD-SCNC: 141 MMOL/L (ref 136–145)
TOTAL PROTEIN: 7.8 G/DL (ref 6.6–8.7)
TRIGL SERPL-MCNC: 166 MG/DL (ref 0–149)
VITAMIN D 25-HYDROXY: 31.2 NG/ML
WBC # BLD: 5.1 K/UL (ref 4.8–10.8)

## 2022-06-08 NOTE — TELEPHONE ENCOUNTER
Appt with me next week needs to be a new patient appt, not a physical.  FYI. I cannot due a physical and new patient in the same visit. She may need to be notified that it won't be a physical so that she's not expecting that.

## 2022-06-09 RX ORDER — CHOLESTYRAMINE 4 G/9G
POWDER, FOR SUSPENSION ORAL
Qty: 60 PACKET | Refills: 5 | Status: SHIPPED | OUTPATIENT
Start: 2022-06-09 | End: 2022-11-02

## 2022-06-10 DIAGNOSIS — D50.9 IRON DEFICIENCY ANEMIA, UNSPECIFIED IRON DEFICIENCY ANEMIA TYPE: ICD-10-CM

## 2022-06-10 RX ORDER — FERROUS SULFATE 325(65) MG
TABLET ORAL
Qty: 30 TABLET | Refills: 2 | Status: SHIPPED | OUTPATIENT
Start: 2022-06-10 | End: 2022-09-06

## 2022-06-14 ENCOUNTER — OFFICE VISIT (OUTPATIENT)
Dept: FAMILY MEDICINE CLINIC | Age: 52
End: 2022-06-14
Payer: MEDICAID

## 2022-06-14 VITALS
SYSTOLIC BLOOD PRESSURE: 124 MMHG | HEART RATE: 77 BPM | OXYGEN SATURATION: 97 % | DIASTOLIC BLOOD PRESSURE: 74 MMHG | WEIGHT: 208 LBS | BODY MASS INDEX: 34.66 KG/M2 | HEIGHT: 65 IN

## 2022-06-14 DIAGNOSIS — F90.0 ATTENTION DEFICIT HYPERACTIVITY DISORDER (ADHD), PREDOMINANTLY INATTENTIVE TYPE: ICD-10-CM

## 2022-06-14 DIAGNOSIS — D50.9 IRON DEFICIENCY ANEMIA, UNSPECIFIED IRON DEFICIENCY ANEMIA TYPE: ICD-10-CM

## 2022-06-14 DIAGNOSIS — M79.672 PAIN OF BOTH HEELS: ICD-10-CM

## 2022-06-14 DIAGNOSIS — Z01.419 WOMEN'S ANNUAL ROUTINE GYNECOLOGICAL EXAMINATION: ICD-10-CM

## 2022-06-14 DIAGNOSIS — M79.671 PAIN OF BOTH HEELS: ICD-10-CM

## 2022-06-14 DIAGNOSIS — E55.9 VITAMIN D DEFICIENCY: ICD-10-CM

## 2022-06-14 DIAGNOSIS — E78.2 MIXED HYPERLIPIDEMIA: Primary | ICD-10-CM

## 2022-06-14 DIAGNOSIS — Z12.31 SCREENING MAMMOGRAM FOR BREAST CANCER: ICD-10-CM

## 2022-06-14 PROCEDURE — 99214 OFFICE O/P EST MOD 30 MIN: CPT | Performed by: NURSE PRACTITIONER

## 2022-06-14 ASSESSMENT — PATIENT HEALTH QUESTIONNAIRE - PHQ9
SUM OF ALL RESPONSES TO PHQ QUESTIONS 1-9: 0
1. LITTLE INTEREST OR PLEASURE IN DOING THINGS: 0
SUM OF ALL RESPONSES TO PHQ QUESTIONS 1-9: 0
SUM OF ALL RESPONSES TO PHQ9 QUESTIONS 1 & 2: 0
SUM OF ALL RESPONSES TO PHQ QUESTIONS 1-9: 0
2. FEELING DOWN, DEPRESSED OR HOPELESS: 0
SUM OF ALL RESPONSES TO PHQ QUESTIONS 1-9: 0

## 2022-06-14 ASSESSMENT — ENCOUNTER SYMPTOMS
CHEST TIGHTNESS: 0
SORE THROAT: 0
ABDOMINAL PAIN: 0
VOMITING: 0
WHEEZING: 0
DIARRHEA: 0
COUGH: 0
SHORTNESS OF BREATH: 0
NAUSEA: 0

## 2022-06-14 NOTE — PATIENT INSTRUCTIONS
Patient Education        Foot Pain: Care Instructions  Your Care Instructions     Foot injuries that cause pain and swelling are fairly common. Almost all sports or home repair projects can cause a misstep that ends up as foot pain. Normalwear and tear, especially as you get older, also can cause foot pain. Most minor foot injuries will heal on their own, and home treatment is usually all you need to do. If you have a severe injury, you may need tests andtreatment. Follow-up care is a key part of your treatment and safety. Be sure to make and go to all appointments, and call your doctor if you are having problems. It's also a good idea to know your test results and keep alist of the medicines you take. How can you care for yourself at home?  Take pain medicines exactly as directed. ? If the doctor gave you a prescription medicine for pain, take it as prescribed. ? If you are not taking a prescription pain medicine, ask your doctor if you can take an over-the-counter medicine.  Rest and protect your foot. Take a break from any activity that may cause pain.  Put ice or a cold pack on your foot for 10 to 20 minutes at a time. Put a thin cloth between the ice and your skin.  Prop up the sore foot on a pillow when you ice it or anytime you sit or lie down during the next 3 days. Try to keep it above the level of your heart. This will help reduce swelling.  Your doctor may recommend that you wrap your foot with an elastic bandage. Keep your foot wrapped for as long as your doctor advises.  If your doctor recommends crutches, use them as directed.  Wear roomy footwear.  As soon as pain and swelling end, begin gentle exercises of your foot. Your doctor can tell you which exercises will help. When should you call for help? Call 911 anytime you think you may need emergency care. For example, call if:     Your foot turns pale, white, blue, or cold.    Call your doctor now or seek immediate medical care if:     You cannot move or stand on your foot.      Your foot looks twisted or out of its normal position.      Your foot is not stable when you step down.      You have signs of infection, such as:  ? Increased pain, swelling, warmth, or redness. ? Red streaks leading from the sore area. ? Pus draining from a place on your foot. ? A fever.      Your foot is numb or tingly. Watch closely for changes in your health, and be sure to contact your doctor if:     You do not get better as expected.      You have bruises from an injury that last longer than 2 weeks. Where can you learn more? Go to https://[facility] CenterspeEngineered Carbon [email or web address].Peach. org and sign in to your Equigerminal account. Enter P772 in the O-film box to learn more about \"Foot Pain: Care Instructions. \"     If you do not have an account, please click on the \"Sign Up Now\" link. Current as of: July 1, 2021               Content Version: 13.2  © 2006-2022 "[employer].". Care instructions adapted under license by [facility] ([facility]). If you have questions about a medical condition or this instruction, always ask your healthcare professional. [place] [address] any warranty or liability for your use of this information. Patient Education        Plantar Fasciitis: Exercises  Introduction  Here are some examples of exercises for you to try. The exercises may be suggested for a condition or for rehabilitation. Start each exercise slowly. Ease off the exercises if you start to have pain. You will be told when to start these exercises and which ones will work bestfor you. How to do the exercises  Towel stretch    1. Sit with your legs extended and knees straight. 2. Place a towel around your foot just under the toes. 3. Hold each end of the towel in each hand, with your hands above your knees. 4. Pull back with the towel so that your foot stretches toward you.   5. Hold the position for at least 15 to 30 seconds. 6. Repeat 2 to 4 times a session, up to 5 sessions a day. Calf stretch    This exercise stretches the muscles at the back of the lower leg (the calf) andthe Achilles tendon. Do this exercise 3 or 4 times a day, 5 days a week. 1. Stand facing a wall with your hands on the wall at about eye level. Put the leg you want to stretch about a step behind your other leg. 2. Keeping your back heel on the floor, bend your front knee until you feel a stretch in the back leg. 3. Hold the stretch for 15 to 30 seconds. Repeat 2 to 4 times. Plantar fascia and calf stretch    Stretching the plantar fascia and calf muscles can increase flexibility and decrease heel pain. You can do this exercise several times each day and beforeand after activity. 1. Stand on a step as shown above. Be sure to hold on to the banister. 2. Slowly let your heels down over the edge of the step as you relax your calf muscles. You should feel a gentle stretch across the bottom of your foot and up the back of your leg to your knee. 3. Hold the stretch about 15 to 30 seconds, and then tighten your calf muscle a little to bring your heel back up to the level of the step. Repeat 2 to 4 times. Towel curls    Make this exercise more challenging by placing a weighted object, such as asoup can, on the other end of the towel. 1. While sitting, place your foot on a towel on the floor and scrunch the towel toward you with your toes. 2. Then, also using your toes, push the towel away from you. [place] pickups    1. Put marbles on the floor next to a cup.  2. Using your toes, try to lift the marbles up from the floor and put them in the cup. Follow-up care is a key part of your treatment and safety. Be sure to make and go to all appointments, and call your doctor if you are having problems. It's also a good idea to know your test results and keep alist of the medicines you take. Where can you learn more?   Go to [email or web address].[email or web address] org and sign in to your Volleyhart account. Enter N943 in the Cervilenzhire box to learn more about \"Plantar Fasciitis: Exercises. \"     If you do not have an account, please click on the \"Sign Up Now\" link. Current as of: July 1, 2021               Content Version: 13.2  © 7321-2555 [employer], [facility]. Care instructions adapted under license by [facility] ([facility]). If you have questions about a medical condition or this instruction, always ask your healthcare professional. [place] [address] any warranty or liability for your use of this information.

## 2022-09-03 DIAGNOSIS — D50.9 IRON DEFICIENCY ANEMIA, UNSPECIFIED IRON DEFICIENCY ANEMIA TYPE: ICD-10-CM

## 2022-09-06 ENCOUNTER — PATIENT ROUNDING (BHMG ONLY) (OUTPATIENT)
Dept: PODIATRY | Facility: CLINIC | Age: 52
End: 2022-09-06

## 2022-09-06 ENCOUNTER — OFFICE VISIT (OUTPATIENT)
Dept: PODIATRY | Facility: CLINIC | Age: 52
End: 2022-09-06

## 2022-09-06 VITALS
WEIGHT: 202.4 LBS | HEART RATE: 77 BPM | SYSTOLIC BLOOD PRESSURE: 122 MMHG | DIASTOLIC BLOOD PRESSURE: 80 MMHG | OXYGEN SATURATION: 96 % | HEIGHT: 67 IN | BODY MASS INDEX: 31.77 KG/M2

## 2022-09-06 DIAGNOSIS — M72.2 PLANTAR FASCIITIS, LEFT: ICD-10-CM

## 2022-09-06 DIAGNOSIS — M72.2 PLANTAR FASCIITIS, RIGHT: ICD-10-CM

## 2022-09-06 DIAGNOSIS — M79.671 FOOT PAIN, BILATERAL: Primary | ICD-10-CM

## 2022-09-06 DIAGNOSIS — M79.672 FOOT PAIN, BILATERAL: Primary | ICD-10-CM

## 2022-09-06 PROCEDURE — 99203 OFFICE O/P NEW LOW 30 MIN: CPT | Performed by: PODIATRIST

## 2022-09-06 PROCEDURE — 20550 NJX 1 TENDON SHEATH/LIGAMENT: CPT | Performed by: PODIATRIST

## 2022-09-06 RX ORDER — TRIAMCINOLONE ACETONIDE 40 MG/ML
20 INJECTION, SUSPENSION INTRA-ARTICULAR; INTRAMUSCULAR ONCE
Status: COMPLETED | OUTPATIENT
Start: 2022-09-06 | End: 2022-09-06

## 2022-09-06 RX ORDER — FERROUS SULFATE 325(65) MG
TABLET ORAL
Qty: 30 TABLET | Refills: 2 | Status: SHIPPED | OUTPATIENT
Start: 2022-09-06

## 2022-09-06 RX ORDER — DEXAMETHASONE SODIUM PHOSPHATE 10 MG/ML
10 INJECTION INTRAMUSCULAR; INTRAVENOUS ONCE
Status: COMPLETED | OUTPATIENT
Start: 2022-09-06 | End: 2022-09-06

## 2022-09-06 RX ORDER — TIMOLOL MALEATE 2.5 MG/ML
1 SOLUTION OPHTHALMIC DAILY
COMMUNITY

## 2022-09-06 RX ORDER — BUPIVACAINE HYDROCHLORIDE 5 MG/ML
1.5 INJECTION, SOLUTION PERINEURAL ONCE
Status: COMPLETED | OUTPATIENT
Start: 2022-09-06 | End: 2022-09-06

## 2022-09-06 RX ORDER — ASPIRIN 325 MG
325 TABLET ORAL DAILY
COMMUNITY

## 2022-09-06 RX ORDER — ATOMOXETINE 25 MG/1
10 CAPSULE ORAL DAILY
COMMUNITY

## 2022-09-06 RX ORDER — OMEPRAZOLE 20 MG/1
20 CAPSULE, DELAYED RELEASE ORAL DAILY
COMMUNITY

## 2022-09-06 RX ORDER — FERROUS SULFATE 325(65) MG
325 TABLET ORAL
COMMUNITY

## 2022-09-06 RX ADMIN — TRIAMCINOLONE ACETONIDE 20 MG: 40 INJECTION, SUSPENSION INTRA-ARTICULAR; INTRAMUSCULAR at 14:45

## 2022-09-06 RX ADMIN — DEXAMETHASONE SODIUM PHOSPHATE 10 MG: 10 INJECTION INTRAMUSCULAR; INTRAVENOUS at 14:44

## 2022-09-06 RX ADMIN — BUPIVACAINE HYDROCHLORIDE 1.5 ML: 5 INJECTION, SOLUTION PERINEURAL at 14:43

## 2022-09-06 RX ADMIN — BUPIVACAINE HYDROCHLORIDE 1.5 ML: 5 INJECTION, SOLUTION PERINEURAL at 14:42

## 2022-09-06 RX ADMIN — DEXAMETHASONE SODIUM PHOSPHATE 10 MG: 10 INJECTION INTRAMUSCULAR; INTRAVENOUS at 14:45

## 2022-09-06 NOTE — PROGRESS NOTES
September 6, 2022    Hello, may I speak with Nathalie Do?    My name is Ratna      I am  with [facility] [place]  [address], [address]  [place] [address] [phone].    Before we get started may I verify your date of birth? 1970    I am calling to officially welcome you to our practice and ask about your recent visit. Is this a good time to talk? yes    Tell me about your visit with us. What things went well?  The DrJaylene Was very nice!  The visit went great.     We're always looking for ways to make our patients' experiences even better. Do you have recommendations on ways we may improve?  no    Overall were you satisfied with your first visit to our practice? yes       I appreciate you taking the time to speak with me today. Is there anything else I can do for you? no      Thank you, and have a great day.

## 2022-10-05 NOTE — PROGRESS NOTES
[facility] - PODIATRY    Today's Date: 10/10/2022     Patient Name: Nathalie Do  MRN: [id number]  CSN: [id number]  PCP: Provider, No Known  Referring Provider: No ref. provider found    SUBJECTIVE     Chief Complaint   Patient presents with   • Follow-up     Мария Tsai APRN PCP2022      1 month for Follow-up- pt states feet doing a lot better since shots- pt denies pain- pt presents with long nails, calluses inside great toes     HPI: Nathalie Do, a 52 y.o.female, comes to clinic as a(n) established patient for follow-up treatment of Foot pain. Denies pertinent medical history. Patient presents for 1 month reevaluation for bilateral plantar fasciitis.  Patient had injections at her last appointment and notes that she noticed quite a bit of improvement later that night after the injections.  She states that she has had significant improvement in symptoms since the injections.  She also notes that she has purchased some inserts for the shoes which also seem to help.  Notes that she has been performing stretches, however, states that she is unable to perform the stretches that were shown to her at her appointment. Denies pain. Relates previous treatment(s) including OTC inserts, injection therapy, stretching. Denies any constitutional symptoms. No other pedal complaints at this time.    History reviewed. No pertinent past medical history.  Past Surgical History:   Procedure Laterality Date   • CHOLECYSTECTOMY     • COLONOSCOPY     • ENDOSCOPY       History reviewed. No pertinent family history.  Social History     Socioeconomic History   • Marital status:    Tobacco Use   • Smoking status: Former     Types: Cigarettes     Quit date:      Years since quittin.7   • Smokeless tobacco: Never   Vaping Use   • Vaping Use: Never used   Substance and Sexual Activity   • Alcohol use: Yes     Comment: occ   • Drug use: Defer   • Sexual activity: Defer     Allergies    Allergen Reactions   • Clindamycin/Lincomycin GI Bleeding     Current Outpatient Medications   Medication Sig Dispense Refill   • aspirin 325 MG tablet Take 325 mg by mouth Daily.     • atomoxetine (STRATTERA) 25 MG capsule Take 10 mg by mouth Daily.     • ferrous sulfate 325 (65 FE) MG tablet Take 325 mg by mouth Daily With Breakfast.     • omeprazole (priLOSEC) 20 MG capsule Take 20 mg by mouth Daily.     • timolol (TIMOPTIC-XR) 0.25 % ophthalmic gel-forming 1 drop Daily.       No current facility-administered medications for this visit.     Review of Systems   Constitutional: Negative for chills and fever.   HENT: Negative for congestion.    Respiratory: Negative for shortness of breath.    Cardiovascular: Negative for chest pain and leg swelling.   Gastrointestinal: Negative for constipation, diarrhea, nausea and vomiting.   Musculoskeletal: Negative for arthralgias.   Skin: Negative for wound.   Neurological: Negative for numbness.       OBJECTIVE     Vitals:    10/10/22 1311   BP: 118/62   Pulse: 88   SpO2: 98%       PHYSICAL EXAM  GEN:   Accompanied by none.     Foot/Ankle Exam:       General:   Appearance: appears stated age and healthy    Orientation: AAOx3    Affect: appropriate    Gait: unimpaired    Assistance: independent    Shoe Gear:  Casual shoes (OTC inserts)    VASCULAR      Right Foot Vascularity   Dorsalis pedis:  2+  Posterior tibial:  2+  Skin Temperature: warm    Edema Grading:  None  CFT:  3  Pedal Hair Growth:  Present  Varicosities: none       Left Foot Vascularity   Dorsalis pedis:  2+  Posterior tibial:  2+  Skin Temperature: warm    Edema Grading:  None  CFT:  3  Pedal Hair Growth:  Present  Varicosities: none        NEUROLOGIC     Right Foot Neurologic   Normal sensation    Light touch sensation:  Normal  Vibratory sensation:  Normal  Hot/Cold sensation: normal    Protective Sensation using [place]-Sergio Monofilament:  10     Left Foot Neurologic   Normal sensation    Light touch  sensation:  Normal  Vibratory sensation:  Normal  Hot/cold sensation: normal    Protective Sensation using [place]-Sergio Monofilament:  10     MUSCULOSKELETAL      Right Foot Musculoskeletal   Ecchymosis:  None  Tenderness: none    Arch:  Normal     Left Foot Musculoskeletal   Ecchymosis:  None  Arch:  Normal     MUSCLE STRENGTH     Right Foot Muscle Strength   Foot dorsiflexion:  5  Foot plantar flexion:  5  Foot inversion:  5  Foot eversion:  5     Left Foot Muscle Strength   Foot dorsiflexion:  5  Foot plantar flexion:  5  Foot inversion:  5  Foot eversion:  5     RANGE OF MOTION      Right Foot Range of Motion   Ankle dorsiflexion:  5     Left Foot Range of Motion   Ankle dorsiflexion:  5     DERMATOLOGIC     Right Foot Dermatologic   Skin: skin intact       Left Foot Dermatologic   Skin: skin intact        RADIOLOGY/NUCLEAR:  No results found.    LABORATORY/CULTURE RESULTS:      PATHOLOGY RESULTS:       ASSESSMENT/PLAN     Diagnoses and all orders for this visit:    1. Foot pain, bilateral (Primary)    2. Plantar fasciitis, right    3. Plantar fasciitis, left      Comprehensive lower extremity examination and evaluation was performed.  Discussed findings and treatment plan including risks, benefits, and treatment options with patient in detail. Patient agreed with treatment plan.  Overall symptoms have improved significantly at this time with injection therapy, inserts, and stretching.  Recommend the patient continue stretching activities and readvised on utilizing some sort of device for traction while stretching to increase gastrocnemius stretch.  Conservative therapy including daily stretching (demonstrated proper way of performing), icing 3x daily, decreased activity, and nsaids PRN.   Continue use of OTC inserts.  An After Visit Summary was printed and given to the patient at discharge, including (if requested) any available informative/educational handouts regarding diagnosis, treatment, or  medications. All questions were answered to patient/family satisfaction. Should symptoms fail to improve or worsen they agree to call or return to clinic or to go to the Emergency Department. Discussed the importance of following up with any needed screening tests/labs/specialist appointments and any requested follow-up recommended by me today. Importance of maintaining follow-up discussed and patient accepts that missed appointments can delay diagnosis and potentially lead to worsening of conditions.  Return if symptoms worsen or fail to improve, for Follow-up with podiatry provider., or sooner if acute issues arise.      Procedures    This document has been electronically signed by DONALD Adorno on October 10, 2022 15:20 CDT

## 2022-10-10 ENCOUNTER — OFFICE VISIT (OUTPATIENT)
Dept: PODIATRY | Facility: CLINIC | Age: 52
End: 2022-10-10

## 2022-10-10 VITALS
BODY MASS INDEX: 31.86 KG/M2 | HEIGHT: 67 IN | OXYGEN SATURATION: 98 % | DIASTOLIC BLOOD PRESSURE: 62 MMHG | WEIGHT: 203 LBS | HEART RATE: 88 BPM | SYSTOLIC BLOOD PRESSURE: 118 MMHG

## 2022-10-10 DIAGNOSIS — M79.672 FOOT PAIN, BILATERAL: Primary | ICD-10-CM

## 2022-10-10 DIAGNOSIS — M79.671 FOOT PAIN, BILATERAL: Primary | ICD-10-CM

## 2022-10-10 DIAGNOSIS — M72.2 PLANTAR FASCIITIS, LEFT: ICD-10-CM

## 2022-10-10 DIAGNOSIS — M72.2 PLANTAR FASCIITIS, RIGHT: ICD-10-CM

## 2022-10-10 PROCEDURE — 99212 OFFICE O/P EST SF 10 MIN: CPT | Performed by: NURSE PRACTITIONER

## 2022-10-10 NOTE — PATIENT INSTRUCTIONS
Plantar Fasciitis Rehab  Ask your health care provider which exercises are safe for you. Do exercises exactly as told by your health care provider and adjust them as directed. It is normal to feel mild stretching, pulling, tightness, or discomfort as you do these exercises. Stop right away if you feel sudden pain or your pain gets worse. Do not begin these exercises until told by your health care provider.  Stretching and range-of-motion exercises  These exercises warm up your muscles and joints and improve the movement and flexibility of your foot. These exercises also help to relieve pain.  Plantar fascia stretch    Sit with your left / right leg crossed over your opposite knee.  Hold your heel with one hand with that thumb near your arch. With your other hand, hold your toes and gently pull them back toward the top of your foot. You should feel a stretch on the base (bottom) of your toes, or the bottom of your foot (plantar fascia), or both.  Hold this stretch for__________ seconds.  Slowly release your toes and return to the starting position.  Repeat __________ times. Complete this exercise __________ times a day.  Gastrocnemius stretch, standing  This exercise is also called a calf (gastroc) stretch. It stretches the muscles in the back of the upper calf.  Stand with your hands against a wall.  Extend your left / right leg behind you, and bend your front knee slightly.  Keeping your heels on the floor, your toes facing forward, and your back knee straight, shift your weight toward the wall. Do not arch your back. You should feel a gentle stretch in your upper calf.  Hold this position for __________ seconds.  Repeat __________ times. Complete this exercise __________ times a day.  Soleus stretch, standing  This exercise is also called a calf (soleus) stretch. It stretches the muscles in the back of the lower calf.  Stand with your hands against a wall.  Extend your left / right leg behind you, and bend your  front knee slightly.  Keeping your heels on the floor and your toes facing forward, bend your back knee and shift your weight slightly over your back leg. You should feel a gentle stretch deep in your lower calf.  Hold this position for __________ seconds.  Repeat __________ times. Complete this exercise __________ times a day.  Gastroc and soleus stretch, standing step  This exercise stretches the muscles in the back of the lower leg. These muscles are in the upper calf (gastrocnemius) and the lower calf (soleus).  Stand with the ball of your left / right foot on the front of a step. The ball of your foot is on the walking surface, right under your toes.  Keep your other foot firmly on the same step.  Hold on to the wall or a railing for balance.  Slowly lift your other foot, allowing your body weight to press your heel down over the edge of the front of the step. Keep knee straight and unbent. You should feel a stretch in your calf.  Hold this position for __________ seconds.  Return both feet to the step.  Repeat this exercise with a slight bend in your left / right knee.  Repeat __________ times with your left / right knee straight and __________ times with your left / right knee bent. Complete this exercise __________ times a day.  Balance exercise  This exercise builds your balance and strength control of your arch to help take pressure off your plantar fascia.  Single leg stand  If this exercise is too easy, you can try it with your eyes closed or while standing on a pillow.  Without shoes, stand near a railing or in a doorway. You may hold on to the railing or door frame as needed.  Stand on your left / right foot. Keep your big toe down on the floor and lift the arch of your foot. You should feel a stretch across the bottom of your foot and your arch. Do not let your foot roll inward.  Hold this position for __________ seconds.  Repeat __________ times. Complete this exercise __________ times a day.  This  information is not intended to replace advice given to you by your health care provider. Make sure you discuss any questions you have with your health care provider.  Document Revised: 09/30/2021 Document Reviewed: 09/30/2021  Elsevier Patient Education © 2022 [employer] Inc[employer]

## 2022-10-31 NOTE — PROGRESS NOTES
Progress Note      Pt Name: Drake Marie  YOB: 1970  MRN: [id number]    Date of evaluation: 11/2/2022  History Obtained From:  Patient, EMR    Chief Complaint   Patient presents with    Follow-up     Iron deficiency anemia, unspecified iron deficiency anemia type     HISTORY OF PRESENT ILLNESS:    Drake Marie is a 61-year-old  female returning to the clinic for continued surveillance regarding the following hematology histories:  Iron deficiency anemia   mild elevation of IgA    Sumi Brar is feeling well. 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 She denies melena or hematochezia. Sumi Brar feels that she may be perimenopausal, denying menses for several months now. Hgb is stable, 14.6 with MCV 88.5. Sumi Brar continues ferrous sulfate 325 mg daily. She is no longer requiring Questran for loose stools. Pollen allergies are stable. HEMATOLOGIC HISTORY: Iron Deficiency Anemia/ Mild IgA MGUS  Drake Marie was seen in initial hematologic consultation on 1/15/2019, referred by ALEXA Thomas regarding iron deficiency anemia. Anemia was noted during a routine office visit, according to Sumi Brar. CBC 12/18/18 included a WBC of 5.2, hemoglobin 8.8 with MCV 76.1 and platelet count 963,891. Differentials were normal.   Serum iron was 26. TSH was 1.17  Sumi Brar has a history of acid reflux, controlled with PPI. She denies hematuria, melena or hematochezia. She is a non-drinker and does not take NSAIDs on a routine basis. She reports regular menstrual cycles lasting 7 days each month. The first 2-4 days are heavy without clots. The upper abdomen is mildly tender upon exam, otherwise negative. Screening colonoscopy 10/02/2018 by Dr. Judith Trent was normal.  GYN evaluation around 2016 by Dr. Remberto Denny was negative according to Sumi Brar. Sumi Brar was initiated on oral iron supplementation in December, 2018.   CBC at presentation 1/15/2019 is improved with a hemoglobin of 10.7, MCV 79.9. Platelets are 090,701 and WBC 6.41. Labs drawn 1/15/19 included:  CMP, normal   Iron 78, TIBC 389, iron saturation 20%, ferritin 19   reticulocyte count 1.3   B12 382, folate 13.3   SPEP, no M spike   IgA elevated at 490, IgG and IgM normal   vWB factor antigen, normal at 99   VWB factor activity, normal at 73  CBC at follow-up on 2/12/19 was improved with a hemoglobin of 12.0 with MCV 83.3 and platelet count 758,439. WBC was 5.07. Endoscopy 2/19/2019 by Dr. Kevin Lennon showed mild gastritis found. There were  no features of celiac disease. Consider GYN evaluation regarding heavy menstrual cycles possibly a contributor of  deficiency. Elevated IgA will be followed peripherally.       Past Medical History:   Diagnosis Date    Anemia     Iron deficiency    Back pain     Chronic cholecystitis without calculus 6/6/2016    Degenerative disc disease     Glaucoma (increased eye pressure)     candidate for due to high pressure/ preventative eye drops    Hepatic steatosis     Kidney stones 9/27/2019    Renal stones      Past Surgical History:   Procedure Laterality Date    CHOLECYSTECTOMY  06/06/2016    MN CYSTO/URETERO/PYELOSCOPY W/LITHOTRIPSY Left 10/31/2017    CYSTOSCOPY URETEROSCOPY stone removal STENT PLACEMENT performed by Clarisa Roman MD at [facility] OR     Current Outpatient Medications   Medication Sig Dispense Refill    Ginkgo Biloba (GINKOBA PO) Take by mouth      Magnesium 400 MG TABS Take by mouth      ferrous sulfate (IRON 325) 325 (65 Fe) MG tablet TAKE 1 TABLET BY MOUTH EVERY DAY 30 tablet 2    omeprazole (PRILOSEC) 20 MG delayed release capsule TAKE 1 CAPSULE BY MOUTH EVERY DAY 90 capsule 2    Cholecalciferol (VITAMIN D3) 50 MCG (2000 UT) CAPS TAKE 1 CAPSULE BY MOUTH EVERY DAY 90 capsule 3    atomoxetine (STRATTERA) 10 MG capsule Take 10 mg by mouth daily      aspirin 500 MG tablet Take 500 mg by mouth every 6 hours as needed for Pain      loratadine (CLARITIN) 10 MG tablet Take 10 mg by mouth daily      timolol (TIMOPTIC) 0.5 % ophthalmic solution Place 1 drop into both eyes daily       L-Lysine 1000 MG TABS Take by mouth Take one tablet on        No current facility-administered medications for this visit. Allergies   Allergen Reactions    Clindamycin/Lincomycin Other (See Comments)     Pt had rectal bleeding while on this medication     Social History     Tobacco Use    Smoking status: Former     Packs/day: 0.50     Years: 5.00     Pack years: 2.50     Types: Cigarettes     Quit date: 2004     Years since quittin.8    Smokeless tobacco: Never   Substance Use Topics    Alcohol use: No    Drug use: No     Family History   Problem Relation Age of Onset    Hypertension Mother     Diabetes Mother     Substance Abuse Father     Coronary Art Dis Paternal Grandfather     Breast Cancer Maternal Aunt     Stomach Cancer Maternal Aunt        Review of Systems   Constitutional:  Negative for fatigue and fever. HENT:  Negative for dental problem, hearing loss, mouth sores, nosebleeds, sore throat and trouble swallowing. Eyes:  Negative for discharge and itching. Respiratory:  Negative for cough, shortness of breath and wheezing. No hemoptysis   Cardiovascular:  Negative for chest pain, palpitations and leg swelling. Gastrointestinal:  Negative for abdominal pain, constipation, diarrhea, nausea and vomiting. Endocrine: Negative for cold intolerance, heat intolerance, polydipsia and polyuria. Genitourinary:  Negative for dysuria, frequency, hematuria and urgency. Musculoskeletal:  Positive for arthralgias. Negative for joint swelling and myalgias. Skin:  Negative for pallor and rash. Allergic/Immunologic: Positive for environmental allergies (stable). Negative for immunocompromised state. Neurological:  Negative for seizures, syncope and numbness. Hematological:  Negative for adenopathy. Does not bruise/bleed easily.    Psychiatric/Behavioral:  Negative for agitation, behavioral problems and confusion. The patient is not nervous/anxious. Physical Exam  Vitals reviewed. Constitutional:       General: She is not in acute distress. Appearance: She is well-developed. She is not toxic-appearing or diaphoretic. HENT:      Head: Normocephalic and atraumatic. Right Ear: External ear normal.      Left Ear: External ear normal.      Nose: Nose normal.      Mouth/Throat:      Mouth: Mucous membranes are moist.   Eyes:      General: No scleral icterus. Right eye: No discharge. Left eye: No discharge. Conjunctiva/sclera: Conjunctivae normal.      Comments: Wears glasses   Neck:      Trachea: No tracheal deviation. Cardiovascular:      Rate and Rhythm: Normal rate and regular rhythm. Heart sounds: No murmur heard. Pulmonary:      Effort: Pulmonary effort is normal. No respiratory distress. Breath sounds: Normal breath sounds. No wheezing or rales. Abdominal:      General: Bowel sounds are normal. There is no distension. Palpations: Abdomen is soft. Tenderness: There is no abdominal tenderness. There is no guarding. Genitourinary:     Comments: Exam deferred  Musculoskeletal:         General: No tenderness or deformity. Cervical back: Neck supple. Comments: Normal ROM all four extremities   Lymphadenopathy:      Cervical: No cervical adenopathy. Right cervical: No superficial cervical adenopathy. Left cervical: No superficial cervical adenopathy. Upper Body:      Right upper body: No supraclavicular adenopathy. Left upper body: No supraclavicular adenopathy. Comments:      Skin:     General: Skin is warm and dry. Findings: No rash. Comments: Hair thinning   Neurological:      Mental Status: She is alert and oriented to person, place, and time. Comments: follows commands, non-focal   Psychiatric:         Behavior: Behavior normal. Behavior is cooperative.          Thought Content: Thought content normal.         Judgment: Judgment normal.      Comments: Alert and oriented to person, place and time. Vitals:    22 1013   BP: (!) 142/68   Pulse: 99   SpO2: 96%   Weight: 200 lb 3.2 oz (90.8 kg)      Wt Readings from Last 3 Encounters:   22 200 lb 3.2 oz (90.8 kg)   22 208 lb (94.3 kg)   21 192 lb (87.1 kg)     LABS reviewed/analyzed by me:     Labs 2020:  CMP: Creatinine: 0.7, GFR: >60, calcium: 9.6, Total Protein: 7.0  SPEP: No m-spike  Immunofixation: no monoclonality detected  Ig, IgA: 503, IgM: 55  Iron: 78, TIBC: 422, Iron saturation: 18  Ferritin: 20.3    CBC:  Lab Results   Component Value Date    WBC 5.19 2022    HGB 14.6 2022    HCT 44.5 2022    MCV 88.5 2022     2022    LABLYMP 1.66 2013    LYMPHOPCT 29.7 2022    RBC 5.03 2022    MCH 29.0 2022    MCHC 32.8 2022    RDW 13.8 2022     Lab Results   Component Value Date     2022    K 3.9 2022     2022    CO2 29 2022    BUN 9 2022    CREATININE 0.7 2022    GLUCOSE 103 2022    CALCIUM 9.4 2022    PROT 7.8 2022    LABALBU 4.1 2022    BILITOT 0.3 2022    ALKPHOS 90 2022    AST 21 2022    ALT 28 2022    LABGLOM >60 2022    GFRAA >59 2022    AGRATIO 1.1 2021    GLOB 3.4 2021      Labs 2021:   Iron: 59, TIBC: 340, Iron Saturation: 17, Ferritin: 224   SPEP: No monoclonality detected  Ig, IgA 471, IgM 50  Kappa light chains 16.2, Lambda light chains: 16.2, K/L ratio 1.00      ASSESSMENT/PLAN:    1. Iron deficiency anemia, unspecified iron deficiency anemia type    2.  High total serum IgA       Iron deficiency anemia  Hgb 14.6, MCV 88.5  Continues ferrous sulfate 325 mg daily   Repeat labs today, if iron is stable will decrease iron to every other day or discontinue  Colonoscopy up-to-date last completed 10/2018    Elevated IgA, stable    Asymptomatic, repeat IgA level today    Care plan discussed with patient    Cancer Screenings  Mammograms 11/5/2021 at [facility]: BI-RADS Category 3, probable benign breast tissue in the right breast for which follow-up diagnostic mammogram is recommended in 12 months. Bilateral mammograms are scheduled 12/6/2022  Pap-smear 6/9/2020 by ZARI Foster was negative, next scheduled 1/2023  Colonoscopy 10/02/2018 by Dr. Fercho Call was normal.  Defer arrangements for health maintenance screening to PCP. Orders Placed This Encounter   Procedures    Ferritin    Iron and TIBC    IgA     Heme stable  Return in about 1 year (around 11/2/2023) for follow up with ALEXA Wilson. I have seen, examined and reviewed this patient medication list, appropriate labs and imaging studies. I reviewed relevant medical records and others physicians notes. I discussed the plan of care with the patient. I answered all questions to the patients satisfaction. I have also reviewed the chief complaint (CC) and part of the history (History of Present Illness (HPI), Past Family Social History [facility]), or Review of Systems (ROS) and made changes when appropriated. Dictated utilizing Dragon transcription software.           ALEXA Giordano  6:57 PM  11/2/2022

## 2022-11-02 ENCOUNTER — HOSPITAL ENCOUNTER (OUTPATIENT)
Dept: INFUSION THERAPY | Age: 52
Discharge: HOME | End: 2022-11-02
Payer: MEDICAID

## 2022-11-02 ENCOUNTER — OFFICE VISIT (OUTPATIENT)
Dept: HEMATOLOGY | Age: 52
End: 2022-11-02
Payer: MEDICAID

## 2022-11-02 VITALS
DIASTOLIC BLOOD PRESSURE: 68 MMHG | WEIGHT: 200.2 LBS | BODY MASS INDEX: 33.32 KG/M2 | SYSTOLIC BLOOD PRESSURE: 142 MMHG | HEART RATE: 99 BPM | OXYGEN SATURATION: 96 %

## 2022-11-02 DIAGNOSIS — Z71.89 CARE PLAN DISCUSSED WITH PATIENT: ICD-10-CM

## 2022-11-02 DIAGNOSIS — R76.8 HIGH TOTAL SERUM IGA: ICD-10-CM

## 2022-11-02 DIAGNOSIS — D50.9 IRON DEFICIENCY ANEMIA, UNSPECIFIED IRON DEFICIENCY ANEMIA TYPE: Primary | ICD-10-CM

## 2022-11-02 DIAGNOSIS — D50.9 IRON DEFICIENCY ANEMIA, UNSPECIFIED IRON DEFICIENCY ANEMIA TYPE: ICD-10-CM

## 2022-11-02 LAB
BASOPHILS ABSOLUTE: 0.02 K/UL (ref 0.01–0.08)
BASOPHILS RELATIVE PERCENT: 0.4 % (ref 0.1–1.2)
EOSINOPHILS ABSOLUTE: 0.05 K/UL (ref 0.04–0.54)
EOSINOPHILS RELATIVE PERCENT: 1 % (ref 0.7–7)
FERRITIN: 138.9 NG/ML (ref 13–150)
HCT VFR BLD CALC: 44.5 % (ref 34.1–44.9)
HEMOGLOBIN: 14.6 G/DL (ref 11.2–15.7)
IGA: 476 MG/DL (ref 70–400)
IRON SATURATION: 18 % (ref 14–50)
IRON: 68 UG/DL (ref 37–145)
LYMPHOCYTES ABSOLUTE: 1.54 K/UL (ref 1.18–3.74)
LYMPHOCYTES RELATIVE PERCENT: 29.7 % (ref 19.3–53.1)
MCH RBC QN AUTO: 29 PG (ref 25.6–32.2)
MCHC RBC AUTO-ENTMCNC: 32.8 G/DL (ref 32.3–35.5)
MCV RBC AUTO: 88.5 FL (ref 79.4–94.8)
MONOCYTES ABSOLUTE: 0.37 K/UL (ref 0.24–0.82)
MONOCYTES RELATIVE PERCENT: 7.1 % (ref 4.7–12.5)
NEUTROPHILS ABSOLUTE: 3.2 K/UL (ref 1.56–6.13)
NEUTROPHILS RELATIVE PERCENT: 61.6 % (ref 34–71.1)
PDW BLD-RTO: 13.8 % (ref 11.7–14.4)
PLATELET # BLD: 188 K/UL (ref 182–369)
PMV BLD AUTO: 9.9 FL (ref 7.4–10.4)
RBC # BLD: 5.03 M/UL (ref 3.93–5.22)
TOTAL IRON BINDING CAPACITY: 380 UG/DL (ref 250–400)
WBC # BLD: 5.19 K/UL (ref 3.98–10.04)

## 2022-11-02 PROCEDURE — 85025 COMPLETE CBC W/AUTO DIFF WBC: CPT

## 2022-11-02 PROCEDURE — 99212 OFFICE O/P EST SF 10 MIN: CPT

## 2022-11-02 PROCEDURE — 99213 OFFICE O/P EST LOW 20 MIN: CPT | Performed by: NURSE PRACTITIONER

## 2022-11-02 PROCEDURE — 36415 COLL VENOUS BLD VENIPUNCTURE: CPT

## 2022-11-02 RX ORDER — CALCIUM CARBONATE 300MG(750)
TABLET,CHEWABLE ORAL
COMMUNITY

## 2022-11-02 ASSESSMENT — ENCOUNTER SYMPTOMS
WHEEZING: 0
EYE ITCHING: 0
CONSTIPATION: 0
COUGH: 0
TROUBLE SWALLOWING: 0
DIARRHEA: 0
SHORTNESS OF BREATH: 0
NAUSEA: 0
VOMITING: 0
SORE THROAT: 0
ABDOMINAL PAIN: 0
EYE DISCHARGE: 0

## 2022-12-01 DIAGNOSIS — D50.9 IRON DEFICIENCY ANEMIA, UNSPECIFIED IRON DEFICIENCY ANEMIA TYPE: ICD-10-CM

## 2022-12-01 RX ORDER — FERROUS SULFATE 325(65) MG
TABLET ORAL
Qty: 90 TABLET | Refills: 2 | Status: SHIPPED | OUTPATIENT
Start: 2022-12-01

## 2022-12-01 RX ORDER — CHOLESTYRAMINE 4 G/9G
POWDER, FOR SUSPENSION ORAL
Qty: 60 PACKET | Refills: 5 | OUTPATIENT
Start: 2022-12-01

## 2022-12-01 NOTE — TELEPHONE ENCOUNTER
Dean Guy called to request a refill on her medication.       Last office visit : 6/14/2022   Next office visit : 12/14/2022     Requested Prescriptions     Pending Prescriptions Disp Refills    ferrous sulfate (IRON 325) 325 (65 Fe) MG tablet [Pharmacy Med Name: FERROUS SULFATE 325 MG TABLET] 30 tablet 2     Sig: TAKE 1 TABLET BY MOUTH EVERY DAY     Refused Prescriptions Disp Refills    cholestyramine (QUESTRAN) 4 g packet [Pharmacy Med Name: CHOLESTYRAMINE PACKET] 60 packet 5     Sig: TAKE 1 PACKET BY MOUTH MIXED IN JUICE 2 TIMES DAILY (WITH MEALS)            Marcelle Zambrano

## 2022-12-06 ENCOUNTER — HOSPITAL ENCOUNTER (OUTPATIENT)
Dept: WOMENS IMAGING | Age: 52
Discharge: HOME | End: 2022-12-06
Payer: MEDICAID

## 2022-12-06 DIAGNOSIS — Z12.31 SCREENING MAMMOGRAM FOR BREAST CANCER: ICD-10-CM

## 2022-12-06 DIAGNOSIS — R92.8 ABNORMAL MAMMOGRAM: ICD-10-CM

## 2022-12-06 PROCEDURE — 77066 DX MAMMO INCL CAD BI: CPT

## 2022-12-07 DIAGNOSIS — E78.2 MIXED HYPERLIPIDEMIA: ICD-10-CM

## 2022-12-07 DIAGNOSIS — E55.9 VITAMIN D DEFICIENCY: ICD-10-CM

## 2022-12-07 DIAGNOSIS — D50.9 IRON DEFICIENCY ANEMIA, UNSPECIFIED IRON DEFICIENCY ANEMIA TYPE: ICD-10-CM

## 2022-12-07 LAB
ALBUMIN SERPL-MCNC: 4.2 G/DL (ref 3.5–5.2)
ALP BLD-CCNC: 103 U/L (ref 35–104)
ALT SERPL-CCNC: 29 U/L (ref 5–33)
ANION GAP SERPL CALCULATED.3IONS-SCNC: 10 MMOL/L (ref 7–19)
AST SERPL-CCNC: 24 U/L (ref 5–32)
BASOPHILS ABSOLUTE: 0 K/UL (ref 0–0.2)
BASOPHILS RELATIVE PERCENT: 0.5 % (ref 0–1)
BILIRUB SERPL-MCNC: 0.4 MG/DL (ref 0.2–1.2)
BUN BLDV-MCNC: 10 MG/DL (ref 6–20)
CALCIUM SERPL-MCNC: 9.5 MG/DL (ref 8.6–10)
CHLORIDE BLD-SCNC: 104 MMOL/L (ref 98–111)
CHOLESTEROL, TOTAL: 214 MG/DL (ref 160–199)
CO2: 29 MMOL/L (ref 22–29)
CREAT SERPL-MCNC: 0.8 MG/DL (ref 0.5–0.9)
EOSINOPHILS ABSOLUTE: 0 K/UL (ref 0–0.6)
EOSINOPHILS RELATIVE PERCENT: 0.5 % (ref 0–5)
GFR SERPL CREATININE-BSD FRML MDRD: >60 ML/MIN/{1.73_M2}
GLUCOSE BLD-MCNC: 92 MG/DL (ref 74–109)
HCT VFR BLD CALC: 44.4 % (ref 37–47)
HDLC SERPL-MCNC: 57 MG/DL (ref 65–121)
HEMOGLOBIN: 14 G/DL (ref 12–16)
IMMATURE GRANULOCYTES #: 0 K/UL
LDL CHOLESTEROL CALCULATED: 124 MG/DL
LYMPHOCYTES ABSOLUTE: 1.3 K/UL (ref 1.1–4.5)
LYMPHOCYTES RELATIVE PERCENT: 30.1 % (ref 20–40)
MCH RBC QN AUTO: 29 PG (ref 27–31)
MCHC RBC AUTO-ENTMCNC: 31.5 G/DL (ref 33–37)
MCV RBC AUTO: 91.9 FL (ref 81–99)
MONOCYTES ABSOLUTE: 0.3 K/UL (ref 0–0.9)
MONOCYTES RELATIVE PERCENT: 7.1 % (ref 0–10)
NEUTROPHILS ABSOLUTE: 2.6 K/UL (ref 1.5–7.5)
NEUTROPHILS RELATIVE PERCENT: 61.6 % (ref 50–65)
PDW BLD-RTO: 13.3 % (ref 11.5–14.5)
PLATELET # BLD: 281 K/UL (ref 130–400)
PMV BLD AUTO: 10 FL (ref 9.4–12.3)
POTASSIUM SERPL-SCNC: 4.6 MMOL/L (ref 3.5–5)
RBC # BLD: 4.83 M/UL (ref 4.2–5.4)
SODIUM BLD-SCNC: 143 MMOL/L (ref 136–145)
TOTAL PROTEIN: 7.2 G/DL (ref 6.6–8.7)
TRIGL SERPL-MCNC: 164 MG/DL (ref 0–149)
VITAMIN D 25-HYDROXY: 29.7 NG/ML
WBC # BLD: 4.3 K/UL (ref 4.8–10.8)

## 2022-12-08 DIAGNOSIS — R92.2 BREAST DENSITY: Primary | ICD-10-CM

## 2022-12-13 ENCOUNTER — TELEPHONE (OUTPATIENT)
Dept: HEMATOLOGY | Age: 52
End: 2022-12-13

## 2022-12-13 NOTE — TELEPHONE ENCOUNTER
Reach out to pt regarding iron pill take every other day. Suggested she take on Tues, Thursday and Sunday. Shouldn't couldn't remember to take every other day. Verbalizes understanding. spontaneous

## 2022-12-14 ENCOUNTER — OFFICE VISIT (OUTPATIENT)
Dept: FAMILY MEDICINE CLINIC | Age: 52
End: 2022-12-14
Payer: MEDICAID

## 2022-12-14 VITALS
SYSTOLIC BLOOD PRESSURE: 124 MMHG | WEIGHT: 210 LBS | TEMPERATURE: 98.2 F | HEIGHT: 65 IN | BODY MASS INDEX: 34.99 KG/M2 | OXYGEN SATURATION: 95 % | HEART RATE: 81 BPM | DIASTOLIC BLOOD PRESSURE: 80 MMHG

## 2022-12-14 DIAGNOSIS — R92.2 BREAST DENSITY: Primary | ICD-10-CM

## 2022-12-14 DIAGNOSIS — D50.9 IRON DEFICIENCY ANEMIA, UNSPECIFIED IRON DEFICIENCY ANEMIA TYPE: ICD-10-CM

## 2022-12-14 DIAGNOSIS — F51.01 PRIMARY INSOMNIA: ICD-10-CM

## 2022-12-14 DIAGNOSIS — E55.9 VITAMIN D DEFICIENCY: ICD-10-CM

## 2022-12-14 DIAGNOSIS — E78.2 MIXED HYPERLIPIDEMIA: ICD-10-CM

## 2022-12-14 PROCEDURE — G8484 FLU IMMUNIZE NO ADMIN: HCPCS | Performed by: NURSE PRACTITIONER

## 2022-12-14 PROCEDURE — 3017F COLORECTAL CA SCREEN DOC REV: CPT | Performed by: NURSE PRACTITIONER

## 2022-12-14 PROCEDURE — G8417 CALC BMI ABV UP PARAM F/U: HCPCS | Performed by: NURSE PRACTITIONER

## 2022-12-14 PROCEDURE — G8427 DOCREV CUR MEDS BY ELIG CLIN: HCPCS | Performed by: NURSE PRACTITIONER

## 2022-12-14 PROCEDURE — 99214 OFFICE O/P EST MOD 30 MIN: CPT | Performed by: NURSE PRACTITIONER

## 2022-12-14 PROCEDURE — 1036F TOBACCO NON-USER: CPT | Performed by: NURSE PRACTITIONER

## 2022-12-14 RX ORDER — LANOLIN ALCOHOL/MO/W.PET/CERES
3 CREAM (GRAM) TOPICAL NIGHTLY PRN
Refills: 3 | COMMUNITY
Start: 2022-12-14

## 2022-12-14 RX ORDER — ACETAMINOPHEN 160 MG
1 TABLET,DISINTEGRATING ORAL DAILY
Qty: 90 CAPSULE | Refills: 3 | Status: SHIPPED | OUTPATIENT
Start: 2022-12-14 | End: 2023-03-14

## 2022-12-14 ASSESSMENT — ENCOUNTER SYMPTOMS
DIARRHEA: 0
SHORTNESS OF BREATH: 0
SORE THROAT: 0
WHEEZING: 0
NAUSEA: 0
CHEST TIGHTNESS: 0
ABDOMINAL PAIN: 0
COUGH: 0

## 2022-12-14 NOTE — PROGRESS NOTES
Results   Component Value Date    LDLCALC 124 12/07/2022 [address] 135 06/07/2022    [address] 80 06/11/2021     No results found for: LABVLDL, VLDL  No results found for: [facility]  Lab Results   Component Value Date    VITD25 29.7 (L) 12/07/2022         Review of Systems   Constitutional:  Negative for chills and fever. HENT:  Negative for congestion, ear pain and sore throat. Respiratory:  Negative for cough, chest tightness, shortness of breath and wheezing. Cardiovascular:  Negative for chest pain. Gastrointestinal:  Negative for abdominal pain, diarrhea and nausea. Musculoskeletal:  Negative for arthralgias and myalgias. Skin:  Negative for rash. Neurological:  Negative for dizziness and light-headedness. Psychiatric/Behavioral:  Positive for sleep disturbance.       Past Medical History:   Diagnosis Date    Anemia     Iron deficiency    Back pain     Chronic cholecystitis without calculus 6/6/2016    Degenerative disc disease     Glaucoma (increased eye pressure)     candidate for due to high pressure/ preventative eye drops    Hepatic steatosis     Kidney stones 9/27/2019    Renal stones        Current Outpatient Medications   Medication Sig Dispense Refill    Cholecalciferol (VITAMIN D3) 50 MCG (2000 UT) CAPS Take 1 capsule by mouth daily 90 capsule 3    melatonin 3 MG TABS tablet Take 1 tablet by mouth nightly as needed (insomnia)  3    ferrous sulfate (IRON 325) 325 (65 Fe) MG tablet TAKE 1 TABLET BY MOUTH EVERY DAY 90 tablet 2    Ginkgo Biloba (GINKOBA PO) Take by mouth      Magnesium 400 MG TABS Take by mouth      omeprazole (PRILOSEC) 20 MG delayed release capsule TAKE 1 CAPSULE BY MOUTH EVERY DAY 90 capsule 2    atomoxetine (STRATTERA) 10 MG capsule Take 10 mg by mouth daily      aspirin 500 MG tablet Take 500 mg by mouth every 6 hours as needed for Pain      loratadine (CLARITIN) 10 MG tablet Take 10 mg by mouth daily      timolol (TIMOPTIC) 0.5 % ophthalmic solution Place 1 drop into both eyes daily       L-Lysine 1000 MG TABS Take by mouth Take one tablet on        No current facility-administered medications for this visit. Allergies   Allergen Reactions    Clindamycin/Lincomycin Other (See Comments)     Pt had rectal bleeding while on this medication       Past Surgical History:   Procedure Laterality Date    CHOLECYSTECTOMY  2016    CHOLECYSTECTOMY  2016    KIDNEY STONE SURGERY      CO CYSTO/URETERO/PYELOSCOPY W/LITHOTRIPSY Left 10/31/2017    CYSTOSCOPY URETEROSCOPY stone removal STENT PLACEMENT performed by Giulia Mckinney [facility] at [facility] OR       Social History     Tobacco Use    Smoking status: Former     Packs/day: 0.50     Years: 5.00     Pack years: 2.50     Types: Cigarettes     Quit date: 2004     Years since quittin.9    Smokeless tobacco: Never   Substance Use Topics    Alcohol use: No    Drug use: No       Family History   Problem Relation Age of Onset    Hypertension Mother     Diabetes Mother     Substance Abuse Father     Coronary Art Dis Paternal Grandfather     Stomach Cancer Maternal Aunt        /80   Pulse 81   Temp 98.2 °F (36.8 °C)   Ht 5' 5\" (1.651 m)   Wt 210 lb (95.3 kg)   SpO2 95%   BMI 34.95 kg/m²     Physical Exam  Vitals reviewed. Constitutional:       General: She is not in acute distress. Appearance: Normal appearance. She is well-developed. HENT:      Head: Normocephalic. Eyes:      Conjunctiva/sclera: Conjunctivae normal.      Pupils: Pupils are equal, round, and reactive to light. Neck:      Thyroid: No thyromegaly. Vascular: No carotid bruit or JVD. Trachea: No tracheal deviation. Cardiovascular:      Rate and Rhythm: Normal rate and regular rhythm. Heart sounds: Normal heart sounds. No murmur heard. Pulmonary:      Effort: Pulmonary effort is normal. No respiratory distress. Breath sounds: Normal breath sounds. No wheezing or rhonchi.    Musculoskeletal:         General: Normal range of motion. Cervical back: Normal range of motion and neck supple. Lymphadenopathy:      Cervical: No cervical adenopathy. Skin:     General: Skin is warm and dry. Findings: No rash. Neurological:      Mental Status: She is alert. Psychiatric:         Mood and Affect: Mood normal.         Behavior: Behavior normal.         Thought Content: Thought content normal.       ASSESSMENT/PLAN:  1. Iron deficiency anemia, unspecified iron deficiency anemia type  -Stable, continue management per hematology    2. Breast density  -I have referred her to general surgery for evaluation and recommendations. Likely benign and stable but I would like her to be evaluated for this. 3. Mixed hyperlipidemia  -Stable, slightly improved. She will continue work on low-fat diet and weight loss. -Check fasting lipid in 6 months. - Lipid Panel; Future  - Comprehensive Metabolic Panel; Future  - TSH; Future    4. Primary insomnia  -Advised OTC melatonin as needed. Can start with 3 mg and titrate up if needed. -Advised that she also discussed this with her psychiatrist.    5. Vitamin D deficiency  -Start back on vitamin D3 2000 units daily. New Rx sent. -Recheck vitamin D level in 6 months  - Cholecalciferol (VITAMIN D3) 50 MCG (2000 UT) CAPS; Take 1 capsule by mouth daily  Dispense: 90 capsule; Refill: 3  - Vitamin D 25 Hydroxy; Future       Return in about 6 months (around 6/14/2023) for follow up, 30 minute visit. Misbah Reginaldokemi was seen today for follow-up. Diagnoses and all orders for this visit:    Breast density    Iron deficiency anemia, unspecified iron deficiency anemia type    Mixed hyperlipidemia  -     Lipid Panel; Future  -     Comprehensive Metabolic Panel; Future  -     TSH; Future    Primary insomnia    Vitamin D deficiency  -     Cholecalciferol (VITAMIN D3) 50 MCG (2000 UT) CAPS; Take 1 capsule by mouth daily  -     Vitamin D 25 Hydroxy;  Future    Other orders  -     melatonin 3 MG TABS tablet; Take 1 tablet by mouth nightly as needed (insomnia)    Medications Discontinued During This Encounter   Medication Reason    Cholecalciferol (VITAMIN D3) 50 MCG (2000 UT) CAPS REORDER     There are no Patient Instructions on file for this visit. Patient voicesunderstanding and agrees to plans along with risks and benefits of plan. Counseling:  Yesenia Cordova case, medications and options were discussed in detail. Patient was instructed to call the office if she questionsregarding her treatment. Should her conditions worsen, she should return to office to be reassessed by ALEXA Guadarrama. she Should to go the closest Emergency Department for any emergency. They verbalizedunderstanding the above instructions. Return in about 6 months (around 6/14/2023) for follow up, 30 minute visit.

## 2022-12-28 ENCOUNTER — OFFICE VISIT (OUTPATIENT)
Dept: SURGERY | Age: 52
End: 2022-12-28

## 2022-12-28 VITALS
OXYGEN SATURATION: 97 % | TEMPERATURE: 98.4 F | WEIGHT: 207.6 LBS | HEART RATE: 90 BPM | HEIGHT: 65 IN | BODY MASS INDEX: 34.59 KG/M2

## 2022-12-28 DIAGNOSIS — N63.10 MASS OF RIGHT BREAST, UNSPECIFIED QUADRANT: Primary | ICD-10-CM

## 2022-12-28 PROCEDURE — 99024 POSTOP FOLLOW-UP VISIT: CPT | Performed by: PHYSICIAN ASSISTANT

## 2023-01-01 NOTE — PROGRESS NOTES
Dano Qiu comes today for breast evaluation. She states on the right breast she has had a placed has been monitored for several years. She states that she has a density within the right breast.  There is no palpable abnormality there is no skin retraction or nipple inversion. Patient Active Problem List    Diagnosis Date Noted    Attention deficit hyperactivity disorder (ADHD), predominantly inattentive type 06/18/2021    Situational anxiety 12/15/2020    Dumping syndrome 02/13/2020    Iron deficiency anemia 02/13/2020    Vitamin D deficiency 02/13/2020    GERD (gastroesophageal reflux disease) 02/13/2020    Dizziness 02/13/2020       Current Outpatient Medications   Medication Sig Dispense Refill    Cholecalciferol (VITAMIN D3) 50 MCG (2000 UT) CAPS Take 1 capsule by mouth daily 90 capsule 3    melatonin 3 MG TABS tablet Take 1 tablet by mouth nightly as needed (insomnia)  3    ferrous sulfate (IRON 325) 325 (65 Fe) MG tablet TAKE 1 TABLET BY MOUTH EVERY DAY 90 tablet 2    Ginkgo Biloba (GINKOBA PO) Take by mouth      Magnesium 400 MG TABS Take by mouth      omeprazole (PRILOSEC) 20 MG delayed release capsule TAKE 1 CAPSULE BY MOUTH EVERY DAY 90 capsule 2    atomoxetine (STRATTERA) 10 MG capsule Take 10 mg by mouth daily      aspirin 500 MG tablet Take 500 mg by mouth every 6 hours as needed for Pain      loratadine (CLARITIN) 10 MG tablet Take 10 mg by mouth daily      timolol (TIMOPTIC) 0.5 % ophthalmic solution Place 1 drop into both eyes daily       L-Lysine 1000 MG TABS Take by mouth Take one tablet on Fridays       No current facility-administered medications for this visit.        Allergies: Clindamycin/lincomycin    Past Medical History:   Diagnosis Date    Anemia     Iron deficiency    Back pain     Chronic cholecystitis without calculus 6/6/2016    Degenerative disc disease     Glaucoma (increased eye pressure)     candidate for due to high pressure/ preventative eye drops    Hepatic steatosis Kidney stones 2019    Renal stones        Past Surgical History:   Procedure Laterality Date    CHOLECYSTECTOMY  2016    CHOLECYSTECTOMY  2016    KIDNEY STONE SURGERY      MO CYSTO/URETERO/PYELOSCOPY W/LITHOTRIPSY Left 10/31/2017    CYSTOSCOPY URETEROSCOPY stone removal STENT PLACEMENT performed by Renard Howard MD at [facility] OR       Family History   Problem Relation Age of Onset    Hypertension Mother     Diabetes Mother     Substance Abuse Father     Coronary Art Dis Paternal Grandfather     Stomach Cancer Maternal Aunt        Social History     Tobacco Use    Smoking status: Former     Packs/day: 0.50     Years: 5.00     Pack years: 2.50     Types: Cigarettes     Quit date: 2004     Years since quittin.0    Smokeless tobacco: Never   Substance Use Topics    Alcohol use: No      Mammogram  Impression:   Right breast, BI-RADS 3, probably benign. Far posterior focal   asymmetry remains stable and this is favored as stable  for 2 years. However given this is extremely far posterior one year additional   follow-up is recommended so attention can be made to this far   posterior location. Recommendation is short-term follow-up right   diagnostic mammogram in 6 months. Overall assessment BI-RADS 3, probably benign. ROS:  review of system reviewed and positive for the above all other systems noted to be negative      PHYSICAL EXAM:  Physical Exam  Pulse 90, temperature 98.4 °F (36.9 °C), height 5' 5\" (1.651 m), weight 207 lb 9.6 oz (94.2 kg), SpO2 97 %, not currently breastfeeding. Constitutional:  This is a 45 y. o.female that appears to be in no acute distress. She is pleasant and answers questions appropriately. Breast:  On examination to her breast, patient has no dominant palpable masses. She has fibrocystic changes throughout both breast.  There is no appreciable skin dimpling.   There is no axillary adenopathy or supraclavicular adenopathy appreciable. Impression  Breast density of right breast    Plan  She already has a 6-month follow-up mammography and I would like to see her back after that exam for further evaluation. She will notify office if there is any changes in her symptoms.       15 minutes was spent during this exam with face-to-face counseling, review of data and physical exam

## 2023-01-10 RX ORDER — CHOLESTYRAMINE 4 G/9G
POWDER, FOR SUSPENSION ORAL
Qty: 60 PACKET | Refills: 5 | OUTPATIENT
Start: 2023-01-10

## 2023-01-10 NOTE — TELEPHONE ENCOUNTER
Dean Tovar called to request a refill on her medication.       Last office visit : Visit date not found   Next office visit : 6/14/2023     Requested Prescriptions     Pending Prescriptions Disp Refills    cholestyramine (QUESTRAN) 4 g packet [Pharmacy Med Name: Julio Bell 60 packet 5     Sig: TAKE 1 PACKET BY MOUTH MIXED IN JUICE 2 TIMES DAILY (WITH MEALS)            Marcelle Zambrano

## 2023-01-11 DIAGNOSIS — E55.9 VITAMIN D DEFICIENCY: ICD-10-CM

## 2023-01-11 RX ORDER — CHOLESTYRAMINE 4 G/9G
1 POWDER, FOR SUSPENSION ORAL 2 TIMES DAILY
Qty: 90 PACKET | Refills: 3 | Status: SHIPPED | OUTPATIENT
Start: 2023-01-11

## 2023-02-21 DIAGNOSIS — K21.9 GASTROESOPHAGEAL REFLUX DISEASE, UNSPECIFIED WHETHER ESOPHAGITIS PRESENT: ICD-10-CM

## 2023-02-24 RX ORDER — OMEPRAZOLE 20 MG/1
CAPSULE, DELAYED RELEASE ORAL
Qty: 90 CAPSULE | Refills: 2 | Status: SHIPPED | OUTPATIENT
Start: 2023-02-24

## 2023-02-24 NOTE — TELEPHONE ENCOUNTER
Requested Prescriptions     Pending Prescriptions Disp Refills    omeprazole (PRILOSEC) 20 MG delayed release capsule [Pharmacy Med Name: OMEPRAZOLE DR 20 MG CAPSULE] 90 capsule 2     Sig: TAKE 1 CAPSULE BY MOUTH EVERY DAY     Last office visit : 12/14/2022   Next office visit : 6/14/2023

## 2023-03-07 ENCOUNTER — OFFICE VISIT (OUTPATIENT)
Dept: OBGYN CLINIC | Age: 53
End: 2023-03-07

## 2023-03-07 VITALS
HEIGHT: 65 IN | DIASTOLIC BLOOD PRESSURE: 85 MMHG | BODY MASS INDEX: 34.49 KG/M2 | HEART RATE: 66 BPM | WEIGHT: 207 LBS | SYSTOLIC BLOOD PRESSURE: 127 MMHG

## 2023-03-07 DIAGNOSIS — Z12.4 CERVICAL CANCER SCREENING: ICD-10-CM

## 2023-03-07 DIAGNOSIS — Z76.89 ENCOUNTER TO ESTABLISH CARE: Primary | ICD-10-CM

## 2023-03-07 DIAGNOSIS — Z11.51 ENCOUNTER FOR SCREENING FOR HUMAN PAPILLOMAVIRUS (HPV): ICD-10-CM

## 2023-03-07 DIAGNOSIS — Z01.419 WELL WOMAN EXAM WITH ROUTINE GYNECOLOGICAL EXAM: ICD-10-CM

## 2023-03-07 ASSESSMENT — ENCOUNTER SYMPTOMS
SHORTNESS OF BREATH: 0
GASTROINTESTINAL NEGATIVE: 1
RECTAL PAIN: 0
CONSTIPATION: 0
CHEST TIGHTNESS: 0
NAUSEA: 0
BACK PAIN: 0
RESPIRATORY NEGATIVE: 1
DIARRHEA: 0
ABDOMINAL PAIN: 0

## 2023-03-07 NOTE — PROGRESS NOTES
Pt presents today for pap smear and breast exam.      Last mammogram:  22  Last pap smear:    Contraception:postmeno    :  2  Para:  2  AB:    Last bone density:  na  Last colonoscopy:    2018

## 2023-03-11 LAB
HPV COMMENT: NORMAL
HPV TYPE 16: NOT DETECTED
HPV TYPE 18: NOT DETECTED
HPVOH (OTHER TYPES): NOT DETECTED

## 2023-06-12 DIAGNOSIS — E78.2 MIXED HYPERLIPIDEMIA: ICD-10-CM

## 2023-06-12 DIAGNOSIS — E55.9 VITAMIN D DEFICIENCY: ICD-10-CM

## 2023-06-12 LAB
25(OH)D3 SERPL-MCNC: 32.9 NG/ML
ALBUMIN SERPL-MCNC: 4.1 G/DL (ref 3.5–5.2)
ALP SERPL-CCNC: 92 U/L (ref 35–104)
ALT SERPL-CCNC: 14 U/L (ref 5–33)
ANION GAP SERPL CALCULATED.3IONS-SCNC: 11 MMOL/L (ref 7–19)
AST SERPL-CCNC: 16 U/L (ref 5–32)
BILIRUB SERPL-MCNC: 0.4 MG/DL (ref 0.2–1.2)
BUN SERPL-MCNC: 14 MG/DL (ref 6–20)
CALCIUM SERPL-MCNC: 9.7 MG/DL (ref 8.6–10)
CHLORIDE SERPL-SCNC: 105 MMOL/L (ref 98–111)
CHOLEST SERPL-MCNC: 247 MG/DL (ref 160–199)
CO2 SERPL-SCNC: 27 MMOL/L (ref 22–29)
CREAT SERPL-MCNC: 0.7 MG/DL (ref 0.5–0.9)
GLUCOSE SERPL-MCNC: 98 MG/DL (ref 74–109)
HDLC SERPL-MCNC: 55 MG/DL (ref 65–121)
LDLC SERPL CALC-MCNC: 163 MG/DL
POTASSIUM SERPL-SCNC: 4.2 MMOL/L (ref 3.5–5)
PROT SERPL-MCNC: 7.4 G/DL (ref 6.6–8.7)
SODIUM SERPL-SCNC: 143 MMOL/L (ref 136–145)
TRIGL SERPL-MCNC: 144 MG/DL (ref 0–149)
TSH SERPL DL<=0.005 MIU/L-ACNC: 0.85 UIU/ML (ref 0.27–4.2)

## 2023-06-19 ENCOUNTER — OFFICE VISIT (OUTPATIENT)
Dept: PRIMARY CARE CLINIC | Age: 53
End: 2023-06-19
Payer: MEDICAID

## 2023-06-19 VITALS
WEIGHT: 210 LBS | HEIGHT: 65 IN | SYSTOLIC BLOOD PRESSURE: 120 MMHG | BODY MASS INDEX: 34.99 KG/M2 | HEART RATE: 78 BPM | TEMPERATURE: 98 F | DIASTOLIC BLOOD PRESSURE: 84 MMHG | OXYGEN SATURATION: 97 %

## 2023-06-19 DIAGNOSIS — N89.8 VAGINAL PRURITUS: Primary | ICD-10-CM

## 2023-06-19 DIAGNOSIS — R82.71 BACTERIURIA: ICD-10-CM

## 2023-06-19 DIAGNOSIS — K21.9 GASTROESOPHAGEAL REFLUX DISEASE, UNSPECIFIED WHETHER ESOPHAGITIS PRESENT: ICD-10-CM

## 2023-06-19 DIAGNOSIS — B02.9 HERPES ZOSTER WITHOUT COMPLICATION: ICD-10-CM

## 2023-06-19 DIAGNOSIS — F90.0 ATTENTION DEFICIT HYPERACTIVITY DISORDER (ADHD), PREDOMINANTLY INATTENTIVE TYPE: ICD-10-CM

## 2023-06-19 DIAGNOSIS — E55.9 VITAMIN D DEFICIENCY: ICD-10-CM

## 2023-06-19 DIAGNOSIS — E78.2 MIXED HYPERLIPIDEMIA: ICD-10-CM

## 2023-06-19 LAB
APPEARANCE FLUID: CLEAR
BILIRUBIN, POC: NORMAL
BLOOD URINE, POC: NORMAL
CLARITY, POC: CLEAR
COLOR, POC: YELLOW
GLUCOSE URINE, POC: NORMAL
KETONES, POC: NORMAL
LEUKOCYTE EST, POC: NORMAL
NITRITE, POC: NORMAL
PH, POC: 7
PROTEIN, POC: NORMAL
SPECIFIC GRAVITY, POC: 1.02
UROBILINOGEN, POC: 0.2

## 2023-06-19 PROCEDURE — 1036F TOBACCO NON-USER: CPT | Performed by: NURSE PRACTITIONER

## 2023-06-19 PROCEDURE — 3017F COLORECTAL CA SCREEN DOC REV: CPT | Performed by: NURSE PRACTITIONER

## 2023-06-19 PROCEDURE — G8417 CALC BMI ABV UP PARAM F/U: HCPCS | Performed by: NURSE PRACTITIONER

## 2023-06-19 PROCEDURE — 81002 URINALYSIS NONAUTO W/O SCOPE: CPT | Performed by: NURSE PRACTITIONER

## 2023-06-19 PROCEDURE — 99214 OFFICE O/P EST MOD 30 MIN: CPT | Performed by: NURSE PRACTITIONER

## 2023-06-19 PROCEDURE — G8427 DOCREV CUR MEDS BY ELIG CLIN: HCPCS | Performed by: NURSE PRACTITIONER

## 2023-06-19 RX ORDER — VALACYCLOVIR HYDROCHLORIDE 1 G/1
1000 TABLET, FILM COATED ORAL 3 TIMES DAILY
Qty: 21 TABLET | Refills: 0 | Status: SHIPPED | OUTPATIENT
Start: 2023-06-19 | End: 2023-06-26

## 2023-06-19 RX ORDER — ROSUVASTATIN CALCIUM 5 MG/1
5 TABLET, COATED ORAL DAILY
Qty: 90 TABLET | Refills: 1 | Status: SHIPPED | OUTPATIENT
Start: 2023-06-19

## 2023-06-19 RX ORDER — FLUCONAZOLE 150 MG/1
150 TABLET ORAL ONCE
Qty: 1 TABLET | Refills: 0 | Status: SHIPPED | OUTPATIENT
Start: 2023-06-19 | End: 2023-06-19

## 2023-06-19 ASSESSMENT — ENCOUNTER SYMPTOMS
COUGH: 0
SHORTNESS OF BREATH: 0
SORE THROAT: 0
CHEST TIGHTNESS: 0
DIARRHEA: 0
WHEEZING: 0
ABDOMINAL PAIN: 0
NAUSEA: 0

## 2023-06-19 ASSESSMENT — PATIENT HEALTH QUESTIONNAIRE - PHQ9
1. LITTLE INTEREST OR PLEASURE IN DOING THINGS: 0
SUM OF ALL RESPONSES TO PHQ QUESTIONS 1-9: 0
SUM OF ALL RESPONSES TO PHQ9 QUESTIONS 1 & 2: 0
2. FEELING DOWN, DEPRESSED OR HOPELESS: 0
SUM OF ALL RESPONSES TO PHQ QUESTIONS 1-9: 0

## 2023-06-21 LAB — BACTERIA UR CULT: NORMAL

## 2023-06-26 ENCOUNTER — OFFICE VISIT (OUTPATIENT)
Dept: SURGERY | Age: 53
End: 2023-06-26

## 2023-06-26 VITALS
HEIGHT: 65 IN | OXYGEN SATURATION: 100 % | BODY MASS INDEX: 34.85 KG/M2 | TEMPERATURE: 98.2 F | HEART RATE: 85 BPM | WEIGHT: 209.2 LBS

## 2023-06-26 DIAGNOSIS — Z12.31 VISIT FOR SCREENING MAMMOGRAM: Primary | ICD-10-CM

## 2023-07-10 RX ORDER — CHOLESTYRAMINE 4 G/9G
1 POWDER, FOR SUSPENSION ORAL 2 TIMES DAILY
Qty: 60 PACKET | Refills: 5 | Status: SHIPPED | OUTPATIENT
Start: 2023-07-10

## 2023-08-28 DIAGNOSIS — K21.9 GASTROESOPHAGEAL REFLUX DISEASE, UNSPECIFIED WHETHER ESOPHAGITIS PRESENT: ICD-10-CM

## 2023-08-28 RX ORDER — OMEPRAZOLE 20 MG/1
CAPSULE, DELAYED RELEASE ORAL
Qty: 90 CAPSULE | Refills: 2 | Status: SHIPPED | OUTPATIENT
Start: 2023-08-28

## 2023-08-28 NOTE — TELEPHONE ENCOUNTER
Marcelino Machado called to request a refill on her medication.       Last office visit : 6/19/2023   Next office visit : 9/19/2023     Requested Prescriptions     Pending Prescriptions Disp Refills    omeprazole (PRILOSEC) 20 MG delayed release capsule [Pharmacy Med Name: OMEPRAZOLE DR 20 MG CAPSULE] 90 capsule 3     Sig: TAKE 1 CAPSULE BY MOUTH EVERY DAY            Puja Weber

## 2023-09-12 DIAGNOSIS — E78.2 MIXED HYPERLIPIDEMIA: ICD-10-CM

## 2023-09-12 LAB
ALBUMIN SERPL-MCNC: 4.4 G/DL (ref 3.5–5.2)
ALP SERPL-CCNC: 95 U/L (ref 35–104)
ALT SERPL-CCNC: 20 U/L (ref 5–33)
ANION GAP SERPL CALCULATED.3IONS-SCNC: 9 MMOL/L (ref 7–19)
AST SERPL-CCNC: 20 U/L (ref 5–32)
BILIRUB SERPL-MCNC: 0.4 MG/DL (ref 0.2–1.2)
BUN SERPL-MCNC: 11 MG/DL (ref 6–20)
CALCIUM SERPL-MCNC: 9.5 MG/DL (ref 8.6–10)
CHLORIDE SERPL-SCNC: 103 MMOL/L (ref 98–111)
CHOLEST SERPL-MCNC: 178 MG/DL (ref 160–199)
CO2 SERPL-SCNC: 30 MMOL/L (ref 22–29)
CREAT SERPL-MCNC: 0.7 MG/DL (ref 0.5–0.9)
GLUCOSE SERPL-MCNC: 98 MG/DL (ref 74–109)
HDLC SERPL-MCNC: 50 MG/DL (ref 65–121)
LDLC SERPL CALC-MCNC: 99 MG/DL
POTASSIUM SERPL-SCNC: 3.9 MMOL/L (ref 3.5–5)
PROT SERPL-MCNC: 7.8 G/DL (ref 6.6–8.7)
SODIUM SERPL-SCNC: 142 MMOL/L (ref 136–145)
TRIGL SERPL-MCNC: 145 MG/DL (ref 0–149)

## 2023-09-19 ENCOUNTER — OFFICE VISIT (OUTPATIENT)
Dept: PRIMARY CARE CLINIC | Age: 53
End: 2023-09-19
Payer: MEDICAID

## 2023-09-19 VITALS
HEIGHT: 65 IN | OXYGEN SATURATION: 99 % | TEMPERATURE: 98 F | DIASTOLIC BLOOD PRESSURE: 80 MMHG | HEART RATE: 59 BPM | SYSTOLIC BLOOD PRESSURE: 120 MMHG | BODY MASS INDEX: 33.99 KG/M2 | WEIGHT: 204 LBS

## 2023-09-19 DIAGNOSIS — K21.9 GASTROESOPHAGEAL REFLUX DISEASE, UNSPECIFIED WHETHER ESOPHAGITIS PRESENT: ICD-10-CM

## 2023-09-19 DIAGNOSIS — D50.9 IRON DEFICIENCY ANEMIA, UNSPECIFIED IRON DEFICIENCY ANEMIA TYPE: ICD-10-CM

## 2023-09-19 DIAGNOSIS — E78.2 MIXED HYPERLIPIDEMIA: ICD-10-CM

## 2023-09-19 DIAGNOSIS — E55.9 VITAMIN D DEFICIENCY: ICD-10-CM

## 2023-09-19 DIAGNOSIS — F90.0 ATTENTION DEFICIT HYPERACTIVITY DISORDER (ADHD), PREDOMINANTLY INATTENTIVE TYPE: ICD-10-CM

## 2023-09-19 DIAGNOSIS — Z00.00 WELL ADULT EXAM: ICD-10-CM

## 2023-09-19 DIAGNOSIS — H53.9 VISION CHANGES: ICD-10-CM

## 2023-09-19 PROCEDURE — G8417 CALC BMI ABV UP PARAM F/U: HCPCS | Performed by: NURSE PRACTITIONER

## 2023-09-19 PROCEDURE — 1036F TOBACCO NON-USER: CPT | Performed by: NURSE PRACTITIONER

## 2023-09-19 PROCEDURE — 99214 OFFICE O/P EST MOD 30 MIN: CPT | Performed by: NURSE PRACTITIONER

## 2023-09-19 PROCEDURE — G8427 DOCREV CUR MEDS BY ELIG CLIN: HCPCS | Performed by: NURSE PRACTITIONER

## 2023-09-19 PROCEDURE — 3017F COLORECTAL CA SCREEN DOC REV: CPT | Performed by: NURSE PRACTITIONER

## 2023-09-19 RX ORDER — FERROUS SULFATE 325(65) MG
TABLET ORAL
Qty: 90 TABLET | Refills: 2
Start: 2023-09-19

## 2023-09-19 RX ORDER — TRAZODONE HYDROCHLORIDE 50 MG/1
50 TABLET ORAL NIGHTLY
COMMUNITY

## 2023-09-19 SDOH — ECONOMIC STABILITY: HOUSING INSECURITY
IN THE LAST 12 MONTHS, WAS THERE A TIME WHEN YOU DID NOT HAVE A STEADY PLACE TO SLEEP OR SLEPT IN A SHELTER (INCLUDING NOW)?: NO

## 2023-09-19 SDOH — ECONOMIC STABILITY: FOOD INSECURITY: WITHIN THE PAST 12 MONTHS, THE FOOD YOU BOUGHT JUST DIDN'T LAST AND YOU DIDN'T HAVE MONEY TO GET MORE.: NEVER TRUE

## 2023-09-19 SDOH — ECONOMIC STABILITY: FOOD INSECURITY: WITHIN THE PAST 12 MONTHS, YOU WORRIED THAT YOUR FOOD WOULD RUN OUT BEFORE YOU GOT MONEY TO BUY MORE.: NEVER TRUE

## 2023-09-19 SDOH — ECONOMIC STABILITY: INCOME INSECURITY: HOW HARD IS IT FOR YOU TO PAY FOR THE VERY BASICS LIKE FOOD, HOUSING, MEDICAL CARE, AND HEATING?: NOT HARD AT ALL

## 2023-09-19 ASSESSMENT — ENCOUNTER SYMPTOMS
WHEEZING: 0
ABDOMINAL PAIN: 0
COUGH: 0
SORE THROAT: 0
NAUSEA: 0
DIARRHEA: 0
CHEST TIGHTNESS: 0
SHORTNESS OF BREATH: 0

## 2023-09-19 NOTE — PROGRESS NOTES
for chest pain. Gastrointestinal:  Negative for abdominal pain, diarrhea and nausea. Musculoskeletal:  Negative for arthralgias and myalgias. Skin:  Negative for rash. Neurological:  Negative for dizziness and light-headedness. Past Medical History:   Diagnosis Date    Anemia     Iron deficiency    Back pain     Chronic cholecystitis without calculus 6/6/2016    Degenerative disc disease     Glaucoma (increased eye pressure)     candidate for due to high pressure/ preventative eye drops    Hepatic steatosis     Kidney stones 9/27/2019    Renal stones        Current Outpatient Medications   Medication Sig Dispense Refill    traZODone (DESYREL) 50 MG tablet Take 1 tablet by mouth nightly      ferrous sulfate (IRON 325) 325 (65 Fe) MG tablet Take 1 tablet 3 days a week. 90 tablet 2    omeprazole (PRILOSEC) 20 MG delayed release capsule TAKE 1 CAPSULE BY MOUTH EVERY DAY 90 capsule 2    cholestyramine (QUESTRAN) 4 g packet TAKE 1 PACKET BY MOUTH 2 TIMES DAILY. 60 packet 5    rosuvastatin (CRESTOR) 5 MG tablet Take 1 tablet by mouth daily 90 tablet 1    vitamin E 1000 units capsule Take 400 Units by mouth in the morning and at bedtime      Cholecalciferol (VITAMIN D3) 50 MCG (2000 UT) CAPS Take 1 capsule by mouth daily 90 capsule 3    melatonin 3 MG TABS tablet Take 1 tablet by mouth nightly as needed (insomnia)  3    Ginkgo Biloba (GINKOBA PO) Take by mouth      Magnesium 400 MG TABS Take by mouth      atomoxetine (STRATTERA) 10 MG capsule Take 1 capsule by mouth daily      aspirin 500 MG tablet Take 1 tablet by mouth every 6 hours as needed for Pain      loratadine (CLARITIN) 10 MG tablet Take 1 tablet by mouth daily      timolol (TIMOPTIC) 0.5 % ophthalmic solution Place 1 drop into both eyes daily      L-Lysine 1000 MG TABS Take by mouth Take one tablet on Fridays       No current facility-administered medications for this visit.        Allergies   Allergen Reactions    Clindamycin/Lincomycin Other (See
No

## 2023-11-27 ENCOUNTER — TELEPHONE (OUTPATIENT)
Dept: HEMATOLOGY | Age: 53
End: 2023-11-27

## 2023-11-28 ENCOUNTER — OFFICE VISIT (OUTPATIENT)
Dept: HEMATOLOGY | Age: 53
End: 2023-11-28
Payer: MEDICAID

## 2023-11-28 ENCOUNTER — HOSPITAL ENCOUNTER (OUTPATIENT)
Dept: INFUSION THERAPY | Age: 53
Discharge: HOME | End: 2023-11-28
Payer: MEDICAID

## 2023-11-28 VITALS
HEART RATE: 75 BPM | DIASTOLIC BLOOD PRESSURE: 60 MMHG | WEIGHT: 214 LBS | HEIGHT: 65 IN | SYSTOLIC BLOOD PRESSURE: 130 MMHG | BODY MASS INDEX: 35.65 KG/M2 | OXYGEN SATURATION: 97 % | TEMPERATURE: 98.9 F

## 2023-11-28 DIAGNOSIS — D50.9 IRON DEFICIENCY ANEMIA, UNSPECIFIED IRON DEFICIENCY ANEMIA TYPE: Primary | ICD-10-CM

## 2023-11-28 DIAGNOSIS — R76.8 HIGH TOTAL SERUM IGA: ICD-10-CM

## 2023-11-28 DIAGNOSIS — D50.9 IRON DEFICIENCY ANEMIA, UNSPECIFIED IRON DEFICIENCY ANEMIA TYPE: ICD-10-CM

## 2023-11-28 LAB
ALBUMIN SERPL-MCNC: 4.4 G/DL (ref 3.5–5.2)
ALP SERPL-CCNC: 101 U/L (ref 35–104)
ALT SERPL-CCNC: 25 U/L (ref 9–52)
ANION GAP SERPL CALCULATED.3IONS-SCNC: 11 MMOL/L (ref 7–19)
AST SERPL-CCNC: 25 U/L (ref 14–36)
BASOPHILS # BLD: 0.02 K/UL (ref 0.01–0.08)
BASOPHILS NFR BLD: 0.4 % (ref 0.1–1.2)
BILIRUB SERPL-MCNC: 0.4 MG/DL (ref 0.2–1.3)
BUN SERPL-MCNC: 17 MG/DL (ref 7–17)
CALCIUM SERPL-MCNC: 10.1 MG/DL (ref 8.4–10.2)
CHLORIDE SERPL-SCNC: 103 MMOL/L (ref 98–111)
CO2 SERPL-SCNC: 26 MMOL/L (ref 22–29)
CREAT SERPL-MCNC: 0.7 MG/DL (ref 0.5–1)
EOSINOPHIL # BLD: 0.05 K/UL (ref 0.04–0.54)
EOSINOPHIL NFR BLD: 0.9 % (ref 0.7–7)
ERYTHROCYTE [DISTWIDTH] IN BLOOD BY AUTOMATED COUNT: 12.6 % (ref 11.7–14.4)
GLOBULIN: 3.7 G/DL
GLUCOSE SERPL-MCNC: 101 MG/DL (ref 74–106)
HCT VFR BLD AUTO: 41.3 % (ref 34.1–44.9)
HGB BLD-MCNC: 14 G/DL (ref 11.2–15.7)
LYMPHOCYTES # BLD: 1.87 K/UL (ref 1.18–3.74)
LYMPHOCYTES NFR BLD: 34 % (ref 19.3–53.1)
MCH RBC QN AUTO: 28.8 PG (ref 25.6–32.2)
MCHC RBC AUTO-ENTMCNC: 33.9 G/DL (ref 32.3–35.5)
MCV RBC AUTO: 85 FL (ref 79.4–94.8)
MONOCYTES # BLD: 0.4 K/UL (ref 0.24–0.82)
MONOCYTES NFR BLD: 7.3 % (ref 4.7–12.5)
NEUTROPHILS # BLD: 3.15 K/UL (ref 1.56–6.13)
NEUTS SEG NFR BLD: 57.2 % (ref 34–71.1)
PLATELET # BLD AUTO: 234 K/UL (ref 182–369)
PMV BLD AUTO: 9.7 FL (ref 7.4–10.4)
POTASSIUM SERPL-SCNC: 3.8 MMOL/L (ref 3.5–5.1)
PROT SERPL-MCNC: 8 G/DL (ref 6.3–8.2)
RBC # BLD AUTO: 4.86 M/UL (ref 3.93–5.22)
SODIUM SERPL-SCNC: 140 MMOL/L (ref 137–145)
WBC # BLD AUTO: 5.5 K/UL (ref 3.98–10.04)

## 2023-11-28 PROCEDURE — G8484 FLU IMMUNIZE NO ADMIN: HCPCS | Performed by: PHYSICIAN ASSISTANT

## 2023-11-28 PROCEDURE — 80053 COMPREHEN METABOLIC PANEL: CPT

## 2023-11-28 PROCEDURE — 36415 COLL VENOUS BLD VENIPUNCTURE: CPT

## 2023-11-28 PROCEDURE — 99212 OFFICE O/P EST SF 10 MIN: CPT

## 2023-11-28 PROCEDURE — G8417 CALC BMI ABV UP PARAM F/U: HCPCS | Performed by: PHYSICIAN ASSISTANT

## 2023-11-28 PROCEDURE — 1036F TOBACCO NON-USER: CPT | Performed by: PHYSICIAN ASSISTANT

## 2023-11-28 PROCEDURE — 99213 OFFICE O/P EST LOW 20 MIN: CPT | Performed by: PHYSICIAN ASSISTANT

## 2023-11-28 PROCEDURE — 85025 COMPLETE CBC W/AUTO DIFF WBC: CPT

## 2023-11-28 PROCEDURE — 3017F COLORECTAL CA SCREEN DOC REV: CPT | Performed by: PHYSICIAN ASSISTANT

## 2023-11-28 PROCEDURE — G8427 DOCREV CUR MEDS BY ELIG CLIN: HCPCS | Performed by: PHYSICIAN ASSISTANT

## 2023-11-28 ASSESSMENT — ENCOUNTER SYMPTOMS
DIARRHEA: 0
BLOOD IN STOOL: 0
SHORTNESS OF BREATH: 0
COUGH: 0
BACK PAIN: 0
EYE DISCHARGE: 0
WHEEZING: 0
TROUBLE SWALLOWING: 0
VOMITING: 0
EYE ITCHING: 0
SORE THROAT: 0
ABDOMINAL DISTENTION: 0
PHOTOPHOBIA: 0
COLOR CHANGE: 0
VOICE CHANGE: 0
ABDOMINAL PAIN: 0
CONSTIPATION: 0
NAUSEA: 0

## 2023-11-28 NOTE — PROGRESS NOTES
Progress Note      Pt Name: Bhumika Urena  YOB: 1970  MRN: [id number]    Date of evaluation: 11/28/2023  History Obtained From:  Patient, EMR    Chief Complaint   Patient presents with    Follow-up     Iron deficiency anemia, unspecified iron deficiency anemia type     HISTORY OF PRESENT ILLNESS:    Bhumika Urena is a 13-year-old  female returning to the clinic for continued surveillance regarding the following hematology histories:  Iron deficiency anemia   mild elevation of IgA  She did have an abnormal mammogram earlier this year had a 6-month follow-up stable. Most likely a benign finding. She is currently back to annual mammograms. She does continue taking iron 3 times a week. She denies any transfusions since her last visit. She is taking her Questran twice a day. She takes omeprazole 20 daily with stable GERD symptoms. She takes several over-the-counter medications. He is on rosuvastatin 5 mg daily. HEMATOLOGIC HISTORY: Iron Deficiency Anemia/ Mild IgA MGUS  Bhumika Urena was seen in initial hematologic consultation on 1/15/2019, referred by ALEXA Bell regarding iron deficiency anemia. Anemia was noted during a routine office visit, according to BRANDY. CBC 12/18/18 included a WBC of 5.2, hemoglobin 8.8 with MCV 76.1 and platelet count [phone]. Differentials were normal.   Serum iron was 26. TSH was 1.17  BRANDY has a history of acid reflux, controlled with PPI. She denies hematuria, melena or hematochezia. She is a non-drinker and does not take NSAIDs on a routine basis. She reports regular menstrual cycles lasting 7 days each month. The first 2-4 days are heavy without clots. The upper abdomen is mildly tender upon exam, otherwise negative. Screening colonoscopy 10/02/2018 by Dr. Bruno Weston was normal.  GYN evaluation around 2016 by Dr. Ramy Cuellar was negative according to BRANDY. BRANDY was initiated on oral iron supplementation in December, 2018.   CBC at

## 2023-12-02 LAB
ALBUMIN SERPL-MCNC: 4.42 G/DL (ref 3.75–5.01)
ALPHA1 GLOB SERPL ELPH-MCNC: 0.25 G/DL (ref 0.19–0.46)
ALPHA2 GLOB SERPL ELPH-MCNC: 0.84 G/DL (ref 0.48–1.05)
B-GLOBULIN SERPL ELPH-MCNC: 1.14 G/DL (ref 0.48–1.1)
DEPRECATED KAPPA LC FREE/LAMBDA SER: 1.27 {RATIO} (ref 0.26–1.65)
EER MONOCLONAL PROTEIN AND FLC, SERUM: ABNORMAL
GAMMA GLOB SERPL ELPH-MCNC: 1.05 G/DL (ref 0.62–1.51)
IGA SERPL-MCNC: 398 MG/DL (ref 68–408)
IGG SERPL-MCNC: 1056 MG/DL (ref 768–1632)
IGM SERPL-MCNC: 45 MG/DL (ref 35–263)
INTERPRETATION SERPL IFE-IMP: ABNORMAL
INTERPRETATION SERPL IFE-IMP: ABNORMAL
KAPPA LC FREE SER-MCNC: 18.17 MG/L (ref 3.3–19.4)
LAMBDA LC FREE SERPL-MCNC: 14.29 MG/L (ref 5.71–26.3)
MONOCLONAL PROTEIN, SERUM: ABNORMAL G/DL
PROT SERPL-MCNC: 7.7 G/DL (ref 6.3–8.2)

## 2023-12-11 ENCOUNTER — HOSPITAL ENCOUNTER (OUTPATIENT)
Dept: WOMENS IMAGING | Age: 53
Discharge: HOME | End: 2023-12-11
Payer: MEDICAID

## 2023-12-11 DIAGNOSIS — Z12.31 VISIT FOR SCREENING MAMMOGRAM: ICD-10-CM

## 2023-12-11 PROCEDURE — 77063 BREAST TOMOSYNTHESIS BI: CPT

## 2023-12-24 DIAGNOSIS — E55.9 VITAMIN D DEFICIENCY: ICD-10-CM

## 2023-12-27 ENCOUNTER — OFFICE VISIT (OUTPATIENT)
Dept: SURGERY | Age: 53
End: 2023-12-27
Payer: MEDICAID

## 2023-12-27 VITALS — TEMPERATURE: 98.1 F | HEART RATE: 80 BPM | WEIGHT: 211.2 LBS | OXYGEN SATURATION: 98 % | BODY MASS INDEX: 35.15 KG/M2

## 2023-12-27 DIAGNOSIS — Z12.31 VISIT FOR SCREENING MAMMOGRAM: Primary | ICD-10-CM

## 2023-12-27 PROCEDURE — G8417 CALC BMI ABV UP PARAM F/U: HCPCS | Performed by: PHYSICIAN ASSISTANT

## 2023-12-27 PROCEDURE — G8482 FLU IMMUNIZE ORDER/ADMIN: HCPCS | Performed by: PHYSICIAN ASSISTANT

## 2023-12-27 PROCEDURE — G8427 DOCREV CUR MEDS BY ELIG CLIN: HCPCS | Performed by: PHYSICIAN ASSISTANT

## 2023-12-27 PROCEDURE — 3017F COLORECTAL CA SCREEN DOC REV: CPT | Performed by: PHYSICIAN ASSISTANT

## 2023-12-27 PROCEDURE — 1036F TOBACCO NON-USER: CPT | Performed by: PHYSICIAN ASSISTANT

## 2023-12-27 PROCEDURE — 99213 OFFICE O/P EST LOW 20 MIN: CPT | Performed by: PHYSICIAN ASSISTANT

## 2023-12-27 RX ORDER — UBIDECARENONE 75 MG
50 CAPSULE ORAL DAILY
COMMUNITY

## 2023-12-27 RX ORDER — ACETAMINOPHEN 500 MG
500 TABLET ORAL EVERY 6 HOURS PRN
COMMUNITY

## 2023-12-27 RX ORDER — ROSUVASTATIN CALCIUM 5 MG/1
5 TABLET, COATED ORAL DAILY
Qty: 90 TABLET | Refills: 1 | Status: SHIPPED | OUTPATIENT
Start: 2023-12-27

## 2023-12-27 RX ORDER — NAPROXEN 250 MG/1
250 TABLET ORAL 2 TIMES DAILY WITH MEALS
COMMUNITY

## 2023-12-27 RX ORDER — ACETAMINOPHEN 160 MG
TABLET,DISINTEGRATING ORAL DAILY
Qty: 90 CAPSULE | Refills: 0 | Status: SHIPPED | OUTPATIENT
Start: 2023-12-27

## 2023-12-27 NOTE — TELEPHONE ENCOUNTER
Rula Keys called to request a refill on her medication.       Last office visit : 9/19/2023   Next office visit : 12/25/2023     Requested Prescriptions     Pending Prescriptions Disp Refills    Cholecalciferol (VITAMIN D3) 50 MCG (2000 UT) CAPS [Pharmacy Med Name: VITAMIN D3 2,000 UNIT SOFTGEL] 90 capsule 3     Sig: TAKE 1 CAPSULE BY MOUTH EVERY DAY            Santana Doe

## 2023-12-27 NOTE — TELEPHONE ENCOUNTER
Gila Orf called to request a refill on her medication.       Last office visit : 9/19/2023   Next office visit : 3/19/2024     Requested Prescriptions     Pending Prescriptions Disp Refills    rosuvastatin (CRESTOR) 5 MG tablet [Pharmacy Med Name: ROSUVASTATIN CALCIUM 5 MG TAB] 90 tablet 1     Sig: TAKE 1 TABLET BY MOUTH EVERY DAY            Padmini Mendoza

## 2023-12-27 NOTE — PROGRESS NOTES
Hi Tse comes today for her follow-up breast exam.  She has had no new breast complaints. She reports no new palpable masses. There is no skin or nipple changes. There is no nipple discharge. She has no appreciable evidence of supraclavicular or axillary adenopathy. Patient Active Problem List    Diagnosis Date Noted    Mixed hyperlipidemia 06/19/2023    Attention deficit hyperactivity disorder (ADHD), predominantly inattentive type 06/18/2021    Situational anxiety 12/15/2020    Dumping syndrome 02/13/2020    Iron deficiency anemia 02/13/2020    Vitamin D deficiency 02/13/2020    GERD (gastroesophageal reflux disease) 02/13/2020    Dizziness 02/13/2020       Current Outpatient Medications   Medication Sig Dispense Refill    naproxen (NAPROSYN) 250 MG tablet Take 1 tablet by mouth 2 times daily (with meals) Patient states she is taking 220 mg three days a week stated on 12/27/23      acetaminophen (TYLENOL) 500 MG tablet Take 1 tablet by mouth every 6 hours as needed for Pain      vitamin B-12 (CYANOCOBALAMIN) 100 MCG tablet Take 0.5 tablets by mouth daily      traZODone (DESYREL) 50 MG tablet Take 1 tablet by mouth nightly      ferrous sulfate (IRON 325) 325 (65 Fe) MG tablet Take 1 tablet 3 days a week. 90 tablet 2    omeprazole (PRILOSEC) 20 MG delayed release capsule TAKE 1 CAPSULE BY MOUTH EVERY DAY 90 capsule 2    cholestyramine (QUESTRAN) 4 g packet TAKE 1 PACKET BY MOUTH 2 TIMES DAILY.  60 packet 5    rosuvastatin (CRESTOR) 5 MG tablet Take 1 tablet by mouth daily 90 tablet 1    vitamin E 1000 units capsule Take 400 Units by mouth in the morning and at bedtime Patient states she is taking Vitamin E 400 units one daily      melatonin 3 MG TABS tablet Take 1 tablet by mouth nightly as needed (insomnia) (Patient taking differently: Take 1 tablet by mouth nightly as needed (insomnia) Patient states she is taking 10 mg instead  stated on12/27/23)  3    Ginkgo Biloba (GINKOBA PO) Take by mouth

## 2024-01-12 RX ORDER — CHOLESTYRAMINE 4 G/9G
1 POWDER, FOR SUSPENSION ORAL 2 TIMES DAILY
Qty: 60 PACKET | Refills: 5 | Status: SHIPPED | OUTPATIENT
Start: 2024-01-12

## 2024-03-11 DIAGNOSIS — Z00.00 WELL ADULT EXAM: ICD-10-CM

## 2024-03-11 DIAGNOSIS — D50.9 IRON DEFICIENCY ANEMIA, UNSPECIFIED IRON DEFICIENCY ANEMIA TYPE: ICD-10-CM

## 2024-03-11 DIAGNOSIS — E78.2 MIXED HYPERLIPIDEMIA: ICD-10-CM

## 2024-03-11 DIAGNOSIS — E55.9 VITAMIN D DEFICIENCY: ICD-10-CM

## 2024-03-11 LAB
25(OH)D3 SERPL-MCNC: 30.1 NG/ML
ALBUMIN SERPL-MCNC: 4.5 G/DL (ref 3.5–5.2)
ALP SERPL-CCNC: 103 U/L (ref 35–104)
ALT SERPL-CCNC: 20 U/L (ref 5–33)
ANION GAP SERPL CALCULATED.3IONS-SCNC: 16 MMOL/L (ref 7–19)
AST SERPL-CCNC: 19 U/L (ref 5–32)
BASOPHILS # BLD: 0 K/UL (ref 0–0.2)
BASOPHILS NFR BLD: 0.4 % (ref 0–1)
BILIRUB SERPL-MCNC: 0.4 MG/DL (ref 0.2–1.2)
BUN SERPL-MCNC: 13 MG/DL (ref 6–20)
CALCIUM SERPL-MCNC: 10.1 MG/DL (ref 8.6–10)
CHLORIDE SERPL-SCNC: 105 MMOL/L (ref 98–111)
CHOLEST SERPL-MCNC: 237 MG/DL (ref 160–199)
CO2 SERPL-SCNC: 25 MMOL/L (ref 22–29)
CREAT SERPL-MCNC: 0.7 MG/DL (ref 0.5–0.9)
EOSINOPHIL # BLD: 0 K/UL (ref 0–0.6)
EOSINOPHIL NFR BLD: 0.8 % (ref 0–5)
ERYTHROCYTE [DISTWIDTH] IN BLOOD BY AUTOMATED COUNT: 12.3 % (ref 11.5–14.5)
GLUCOSE SERPL-MCNC: 99 MG/DL (ref 74–109)
HCT VFR BLD AUTO: 44.3 % (ref 37–47)
HDLC SERPL-MCNC: 60 MG/DL (ref 65–121)
HGB BLD-MCNC: 14.3 G/DL (ref 12–16)
IMM GRANULOCYTES # BLD: 0 K/UL
LDLC SERPL CALC-MCNC: 124 MG/DL
LYMPHOCYTES # BLD: 1.6 K/UL (ref 1.1–4.5)
LYMPHOCYTES NFR BLD: 31.2 % (ref 20–40)
MCH RBC QN AUTO: 28.6 PG (ref 27–31)
MCHC RBC AUTO-ENTMCNC: 32.3 G/DL (ref 33–37)
MCV RBC AUTO: 88.6 FL (ref 81–99)
MONOCYTES # BLD: 0.3 K/UL (ref 0–0.9)
MONOCYTES NFR BLD: 6.5 % (ref 0–10)
NEUTROPHILS # BLD: 3.1 K/UL (ref 1.5–7.5)
NEUTS SEG NFR BLD: 60.9 % (ref 50–65)
PLATELET # BLD AUTO: 246 K/UL (ref 130–400)
PMV BLD AUTO: 10 FL (ref 9.4–12.3)
POTASSIUM SERPL-SCNC: 4.1 MMOL/L (ref 3.5–5)
PROT SERPL-MCNC: 8.1 G/DL (ref 6.6–8.7)
RBC # BLD AUTO: 5 M/UL (ref 4.2–5.4)
SODIUM SERPL-SCNC: 146 MMOL/L (ref 136–145)
T4 FREE SERPL-MCNC: 1.15 NG/DL (ref 0.93–1.7)
TRIGL SERPL-MCNC: 263 MG/DL (ref 0–149)
TSH SERPL DL<=0.005 MIU/L-ACNC: 0.79 UIU/ML (ref 0.27–4.2)
WBC # BLD AUTO: 5.1 K/UL (ref 4.8–10.8)

## 2024-03-12 ENCOUNTER — OFFICE VISIT (OUTPATIENT)
Dept: OBGYN CLINIC | Age: 54
End: 2024-03-12
Payer: MEDICAID

## 2024-03-12 VITALS
BODY MASS INDEX: 33.43 KG/M2 | HEIGHT: 67 IN | HEART RATE: 82 BPM | WEIGHT: 213 LBS | DIASTOLIC BLOOD PRESSURE: 86 MMHG | SYSTOLIC BLOOD PRESSURE: 134 MMHG

## 2024-03-12 DIAGNOSIS — Z12.31 SCREENING MAMMOGRAM FOR BREAST CANCER: ICD-10-CM

## 2024-03-12 DIAGNOSIS — Z12.39 ENCOUNTER FOR SCREENING BREAST EXAMINATION: ICD-10-CM

## 2024-03-12 DIAGNOSIS — Z01.419 ENCOUNTER FOR ANNUAL ROUTINE GYNECOLOGICAL EXAMINATION: Primary | ICD-10-CM

## 2024-03-12 PROCEDURE — 99396 PREV VISIT EST AGE 40-64: CPT

## 2024-03-12 PROCEDURE — G8482 FLU IMMUNIZE ORDER/ADMIN: HCPCS

## 2024-03-12 ASSESSMENT — ENCOUNTER SYMPTOMS
GASTROINTESTINAL NEGATIVE: 1
BACK PAIN: 0
ABDOMINAL PAIN: 0
RECTAL PAIN: 0
CONSTIPATION: 0
DIARRHEA: 0
RESPIRATORY NEGATIVE: 1
CHEST TIGHTNESS: 0
NAUSEA: 0
SHORTNESS OF BREATH: 0

## 2024-03-12 NOTE — PROGRESS NOTES
[facility]  CNM Office Note    Sharri Hernández is a 54 y.o. female who presents today for her medical conditions/ complaints as noted below.  Chief Complaint   Patient presents with    Annual Exam     Last mammogram:  23  Last pap smear:  3/6/23  Contraception:  posmeno  :  2  Para:  2  AB:    Last bone density:    Last colonoscopy:    or 2019?    HPI  Sharri Hernández presents today for annual exam. She is due for pap smear and mammogram. She denies history of abnormal pap smears and reports history of abnormal mammograms but states she was released by [employer] in December. She denies family history of breast cancer. She is postmenopausal. She denies issues with bladder, bowel, or intercourse. She feels her sleep pattern and quality is Fair.      Problems/Complaints today:  1. Encounter for annual routine gynecological examination  2. Encounter for screening for cervical cancer  3. Screening for HPV (human papillomavirus)  4. Screening mammogram for breast cancer  5. Encounter for screening breast examination       Patient Active Problem List   Diagnosis    Dumping syndrome    Iron deficiency anemia    Vitamin D deficiency    GERD (gastroesophageal reflux disease)    Dizziness    Situational anxiety    Attention deficit hyperactivity disorder (ADHD), predominantly inattentive type    Mixed hyperlipidemia       Patient's last menstrual period was 2019 (within days).      Past Medical History:   Diagnosis Date    Anemia     Iron deficiency    Back pain     Chronic cholecystitis without calculus 2016    Degenerative disc disease     Glaucoma (increased eye pressure)     candidate for due to high pressure/ preventative eye drops    Hepatic steatosis     Kidney stones 2019    Renal stones      Past Surgical History:   Procedure Laterality Date    CHOLECYSTECTOMY  2016    CHOLECYSTECTOMY  2016    KIDNEY STONE SURGERY      RI CYSTO W/URETEROSCOPY W/LITHOTRIPSY Left

## 2024-03-12 NOTE — PROGRESS NOTES
Pt presents today for pap smear and breast exam.  She also complains of     Last mammogram:  23  Last pap smear:  3/6/23  Contraception:  posmeno  :  2  Para:  2  AB:    Last bone density:    Last colonoscopy:    or 2019?

## 2024-03-19 ENCOUNTER — OFFICE VISIT (OUTPATIENT)
Dept: PRIMARY CARE CLINIC | Age: 54
End: 2024-03-19
Payer: MEDICAID

## 2024-03-19 VITALS
SYSTOLIC BLOOD PRESSURE: 136 MMHG | HEART RATE: 76 BPM | BODY MASS INDEX: 34.37 KG/M2 | TEMPERATURE: 98.6 F | HEIGHT: 67 IN | OXYGEN SATURATION: 99 % | DIASTOLIC BLOOD PRESSURE: 86 MMHG | WEIGHT: 219 LBS

## 2024-03-19 DIAGNOSIS — E78.2 MIXED HYPERLIPIDEMIA: ICD-10-CM

## 2024-03-19 DIAGNOSIS — E83.52 HYPERCALCEMIA: ICD-10-CM

## 2024-03-19 DIAGNOSIS — K21.9 GASTROESOPHAGEAL REFLUX DISEASE, UNSPECIFIED WHETHER ESOPHAGITIS PRESENT: ICD-10-CM

## 2024-03-19 DIAGNOSIS — R20.0 NUMBNESS AND TINGLING OF BOTH LEGS: ICD-10-CM

## 2024-03-19 DIAGNOSIS — E55.9 VITAMIN D DEFICIENCY: ICD-10-CM

## 2024-03-19 DIAGNOSIS — R20.2 NUMBNESS AND TINGLING OF BOTH LEGS: ICD-10-CM

## 2024-03-19 DIAGNOSIS — R10.10 UPPER ABDOMINAL PAIN: ICD-10-CM

## 2024-03-19 DIAGNOSIS — R10.84 GENERALIZED ABDOMINAL PAIN: Primary | ICD-10-CM

## 2024-03-19 DIAGNOSIS — D50.9 IRON DEFICIENCY ANEMIA, UNSPECIFIED IRON DEFICIENCY ANEMIA TYPE: ICD-10-CM

## 2024-03-19 DIAGNOSIS — Z00.00 WELL ADULT EXAM: ICD-10-CM

## 2024-03-19 PROCEDURE — 99214 OFFICE O/P EST MOD 30 MIN: CPT | Performed by: NURSE PRACTITIONER

## 2024-03-19 PROCEDURE — G8427 DOCREV CUR MEDS BY ELIG CLIN: HCPCS | Performed by: NURSE PRACTITIONER

## 2024-03-19 PROCEDURE — 99396 PREV VISIT EST AGE 40-64: CPT | Performed by: NURSE PRACTITIONER

## 2024-03-19 PROCEDURE — 1036F TOBACCO NON-USER: CPT | Performed by: NURSE PRACTITIONER

## 2024-03-19 PROCEDURE — G8417 CALC BMI ABV UP PARAM F/U: HCPCS | Performed by: NURSE PRACTITIONER

## 2024-03-19 PROCEDURE — 3017F COLORECTAL CA SCREEN DOC REV: CPT | Performed by: NURSE PRACTITIONER

## 2024-03-19 PROCEDURE — G8482 FLU IMMUNIZE ORDER/ADMIN: HCPCS | Performed by: NURSE PRACTITIONER

## 2024-03-19 RX ORDER — OMEPRAZOLE 40 MG/1
40 CAPSULE, DELAYED RELEASE ORAL DAILY
Qty: 90 CAPSULE | Refills: 1 | Status: SHIPPED | OUTPATIENT
Start: 2024-03-19

## 2024-03-19 RX ORDER — ROSUVASTATIN CALCIUM 10 MG/1
10 TABLET, COATED ORAL DAILY
Qty: 90 TABLET | Refills: 1 | Status: SHIPPED | OUTPATIENT
Start: 2024-03-19

## 2024-03-19 ASSESSMENT — PATIENT HEALTH QUESTIONNAIRE - PHQ9
SUM OF ALL RESPONSES TO PHQ9 QUESTIONS 1 & 2: 0
1. LITTLE INTEREST OR PLEASURE IN DOING THINGS: NOT AT ALL
2. FEELING DOWN, DEPRESSED OR HOPELESS: NOT AT ALL
SUM OF ALL RESPONSES TO PHQ QUESTIONS 1-9: 0

## 2024-03-19 ASSESSMENT — ENCOUNTER SYMPTOMS
WHEEZING: 0
SHORTNESS OF BREATH: 0
CHEST TIGHTNESS: 0
DIARRHEA: 0
SORE THROAT: 0
COUGH: 0
ABDOMINAL PAIN: 1
NAUSEA: 0

## 2024-03-19 NOTE — PROGRESS NOTES
Ms.Lucy Hernández is a 54 y.o. female who presents today for  Chief Complaint   Patient presents with    Follow-up       HPI:  Here for physical  Discussed Shingrix, Tdap, can complete at pharmacy due to insurance.  Colonoscopy UTD 2018 ([facility]), normal, recall 10y  Pap per [facility] G[facility]  Mammogram and breast exam per [facility] general surgery.  History of right breast density on mammogram.    She reports intermittent numbness and tingling in her lower legs and feet.  Symptoms are exacerbated by prolonged sitting.  No increased back pain.    She has had intermittent RUQ pain for months.  Mainly brought on by position changes.  GERD stable on omeprazole.  She has history of cholecystectomy.  She takes Questran 1-2 times daily for postcholecystectomy diarrhea.    Hyperlipidemia  Taking rosuvastatin and tolerating well without side effects.. Attempting to reduce processed sugar and cholesterol from diet.  Lipids are worse on current lab.     Followed by hematology for EVI, stable.  She takes ferrous sulfate 3 days a week.    She is followed by psychiatry for ADHD.  She remains on Strattera, well controlled, tolerates well.  They discontinued her Benadryl which she was taking for insomnia.  It was causing dry eyes.  They started her on trazodone 50 mg which is effective.    Vitamin D deficiency, stable.  Taking vitamin D3 2000 units daily.    Labs reviewed  Lab Results   Component Value Date    WBC 5.1 03/11/2024    HGB 14.3 03/11/2024    HCT 44.3 03/11/2024    MCV 88.6 03/11/2024     03/11/2024     Lab Results   Component Value Date     (H) 03/11/2024    K 4.1 03/11/2024     03/11/2024    CO2 25 03/11/2024    BUN 13 03/11/2024    CREATININE 0.7 03/11/2024    GLUCOSE 99 03/11/2024    CALCIUM 10.1 (H) 03/11/2024    PROT 8.1 03/11/2024    LABALBU 4.5 03/11/2024    BILITOT 0.4 03/11/2024    ALKPHOS 103 03/11/2024    AST 19 03/11/2024    ALT 20 03/11/2024    LABGLOM >60 03/11/2024    GFRAA >59 06/07/2022

## 2024-04-22 ENCOUNTER — HOSPITAL ENCOUNTER (OUTPATIENT)
Dept: CT IMAGING | Age: 54
Discharge: HOME | End: 2024-04-22
Payer: MEDICAID

## 2024-04-22 DIAGNOSIS — R10.84 GENERALIZED ABDOMINAL PAIN: ICD-10-CM

## 2024-04-22 PROCEDURE — 74177 CT ABD & PELVIS W/CONTRAST: CPT

## 2024-04-22 PROCEDURE — 6360000004 HC RX CONTRAST MEDICATION: Performed by: NURSE PRACTITIONER

## 2024-04-22 RX ADMIN — IOPAMIDOL 90 ML: 755 INJECTION, SOLUTION INTRAVENOUS at 14:04

## 2024-04-24 ENCOUNTER — HOSPITAL ENCOUNTER (OUTPATIENT)
Dept: NEUROLOGY | Age: 54
Discharge: HOME | End: 2024-04-24
Payer: MEDICAID

## 2024-04-24 DIAGNOSIS — R20.0 NUMBNESS AND TINGLING OF BOTH LEGS: ICD-10-CM

## 2024-04-24 DIAGNOSIS — R20.2 NUMBNESS AND TINGLING OF BOTH LEGS: ICD-10-CM

## 2024-04-24 PROCEDURE — 95886 MUSC TEST DONE W/N TEST COMP: CPT | Performed by: PSYCHIATRY & NEUROLOGY

## 2024-04-24 PROCEDURE — 95886 MUSC TEST DONE W/N TEST COMP: CPT

## 2024-04-24 PROCEDURE — 95909 NRV CNDJ TST 5-6 STUDIES: CPT

## 2024-04-24 PROCEDURE — 95909 NRV CNDJ TST 5-6 STUDIES: CPT | Performed by: PSYCHIATRY & NEUROLOGY

## 2024-04-25 NOTE — PROCEDURES
[facility]  Neurophysiology Department  [facility] [place]  [address]  Phone [phone]  Fax [phone]   NEUROPHYSIOLOGY REPORT  Patient Data  Patient Name Sharri Hernández Referring Provider ALEXA Vazquez   Account Number [id number] Interpreting physician Lauro Schafer M.D.    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      SUMMARY:     Nerve conduction studies of the bilateral lower extremities showed low amplitude right peroneal and left tibial motor responses.     Electromyogram of the bilateral lower extremities was normal.      INTERPRETATION:     Essentially normal nerve conduction studies and electromyogram of both lower extremities.  The mild abnormalities are not though clinically significant.                              Lauro Schafer M.D.      Sensory NCS      Nerve / Sites Rec. Site Onset Lat Peak Lat NP Amp PP Amp Segments Distance Velocity     ms ms µV µV  cm m/s   L Sural - Ankle (Calf)      Calf Ankle 2.7 3.7 4.0 9.2 Calf - Ankle 14 52      Ref.   ?4.5 ?6.0  Ref.     R Sural - Ankle (Calf)      Calf Ankle 3.5 4.4 21.2 22.3 Calf - Ankle 14 40      Ref.   ?4.5 ?6.0  Ref.             Motor NCS      Nerve / Sites Muscle Latency Ref. Amplitude Ref. Amp % Duration Segments Distance Lat Diff Velocity Ref.     ms ms mV mV % ms  cm ms m/s m/s   L Peroneal - EDB      Ankle EDB 2.9 ?6.5 2.2 ?2.0 100 4.9 Ankle - EDB 8.5         Pop fossa EDB 11.6  1.7  76 8.0 Pop fossa - Ankle 36 8.6 42 ?41   R Peroneal - EDB      Ankle EDB 4.5 ?6.5 1.2 ?2.0 100 6.6 Ankle - EDB 8.5         Pop fossa EDB 12.8  1.0  89.3 5.8 Pop fossa - Ankle 38 8.3 46 ?41   L Tibial - AH      Ankle AH 1.9 ?6.0 1.8 ?4.0 100 8.7 Ankle - AH  Z Plasty Text: The lesion was extirpated to the level of the fat with a #15 scalpel blade.  Given the location of the defect, shape of the defect and the proximity to free margins a Z-plasty was deemed most appropriate for repair.  Using a sterile surgical marker, the appropriate transposition arms of the Z-plasty were drawn incorporating the defect and placing the expected incisions within the relaxed skin tension lines where possible.    The area thus outlined was incised deep to adipose tissue with a #15 scalpel blade.  The skin margins were undermined to an appropriate distance in all directions utilizing iris scissors.  The opposing transposition arms were then transposed into place in opposite direction and anchored with interrupted buried subcutaneous sutures.

## 2024-04-26 DIAGNOSIS — R10.10 UPPER ABDOMINAL PAIN: Primary | ICD-10-CM

## 2024-05-14 ENCOUNTER — OFFICE VISIT (OUTPATIENT)
Dept: GASTROENTEROLOGY | Age: 54
End: 2024-05-14
Payer: MEDICAID

## 2024-05-14 VITALS
WEIGHT: 217 LBS | OXYGEN SATURATION: 97 % | SYSTOLIC BLOOD PRESSURE: 130 MMHG | DIASTOLIC BLOOD PRESSURE: 80 MMHG | HEART RATE: 80 BPM | HEIGHT: 67 IN | BODY MASS INDEX: 34.06 KG/M2

## 2024-05-14 DIAGNOSIS — K76.0 FATTY LIVER: Primary | ICD-10-CM

## 2024-05-14 PROCEDURE — G8427 DOCREV CUR MEDS BY ELIG CLIN: HCPCS | Performed by: NURSE PRACTITIONER

## 2024-05-14 PROCEDURE — 1036F TOBACCO NON-USER: CPT | Performed by: NURSE PRACTITIONER

## 2024-05-14 PROCEDURE — 99204 OFFICE O/P NEW MOD 45 MIN: CPT | Performed by: NURSE PRACTITIONER

## 2024-05-14 PROCEDURE — G8417 CALC BMI ABV UP PARAM F/U: HCPCS | Performed by: NURSE PRACTITIONER

## 2024-05-14 PROCEDURE — 3017F COLORECTAL CA SCREEN DOC REV: CPT | Performed by: NURSE PRACTITIONER

## 2024-05-14 NOTE — PROGRESS NOTES
Subjective:     Patient ID: Sharri Hernández is a 54 y.o. female  PCP: Nithya Carrasco APRN  Referring Provider: Nithya Carrasco, *    HPI  Patient presents to the office today with the following complaints: New Patient and Abdominal Pain      Patient seen in the office today with complaints of RUQ abd pain that has been going on for \"years\" off and on   She was seeing Dr. Pickett     She is taking questran for her diarrhea since having her gallbladder removed  She is also taking fiber supplement   Colonoscopy is up to date     Omeprazole controls her acid reflux very well    Liver enzymes normal   CT scan showed hepatic steatosis and hepatomegaly   CT Result (most recent):  CT ABDOMEN PELVIS W IV CONTRAST 04/22/2024    Narrative  EXAM: CT ABDOMEN AND PELVIS WITH CONTRAST    HISTORY: Generalized abdominal pain.    TECHNIQUE: Contiguous axial tomographic sections were obtained from the dome of the diaphragm through the ischial tuberosities following the administration of iodinated intravenous contrast.  Coronal and sagittal reformats were then performed.  Automatic  exposure control was utilized for dose reduction technique.    COMPARISON: None.    FINDINGS:    LOWER CHEST: Normal. Limited evaluation.    LIVER: There is hepatomegaly and diffuse hepatic steatosis.  No focal lesions.  Liver measures 18.5 cm craniocaudally.    GALLBLADDER: Cholecystectomy.    BILIARY: No intrahepatic or extrahepatic biliary ductal dilatation.    PANCREAS: Normal contour and attenuation without focal lesion. Normal caliber main pancreatic duct.    SPLEEN: Normal size and contour.    ADRENALS: Normal size and shape without nodularity.    KIDNEYS: The kidneys are unremarkable.    PERITONEUM/MESENTERY: No free fluid, free air or mesenteric inflammatory change. No loculated fluid collection.    VASCULATURE: Nonaneurysmal aorta.    LYMPH NODES: No pathologically enlarged lymph nodes by CT size criteria.    BOWEL: No bowel

## 2024-05-21 ENCOUNTER — HOSPITAL ENCOUNTER (OUTPATIENT)
Dept: ULTRASOUND IMAGING | Age: 54
Discharge: HOME | End: 2024-05-21
Payer: MEDICAID

## 2024-05-21 DIAGNOSIS — K76.0 FATTY LIVER: ICD-10-CM

## 2024-05-21 PROCEDURE — 76705 ECHO EXAM OF ABDOMEN: CPT

## 2024-05-21 PROCEDURE — 76981 USE PARENCHYMA: CPT

## 2024-05-21 ASSESSMENT — ENCOUNTER SYMPTOMS
ABDOMINAL PAIN: 1
NAUSEA: 0
CONSTIPATION: 0
TROUBLE SWALLOWING: 0
VOMITING: 0
ANAL BLEEDING: 0
SHORTNESS OF BREATH: 0
COUGH: 0
CHOKING: 0
BLOOD IN STOOL: 0
DIARRHEA: 0
ABDOMINAL DISTENTION: 0
RECTAL PAIN: 0

## 2024-05-28 ENCOUNTER — TELEPHONE (OUTPATIENT)
Dept: GASTROENTEROLOGY | Age: 54
End: 2024-05-28

## 2024-05-28 NOTE — TELEPHONE ENCOUNTER
5-28-24     Thanks Sara,  pt has been notified of results and recommendations.      Results routed to PCP

## 2024-05-28 NOTE — TELEPHONE ENCOUNTER
----- Message from ALEXA Moffett sent at 5/28/2024  9:36 AM CDT -----  Liver elastography score of F4 is severe stiffness of the liver due to fatty liver. Need to decrease saturated fats and decrease BMI

## 2024-05-28 NOTE — TELEPHONE ENCOUNTER
----- Message from ALEXA Moffett sent at 5/28/2024  9:35 AM CDT -----  Liver ultrasound shows fatty liver, no liver lesion and the contour is normal

## 2024-06-11 DIAGNOSIS — E83.52 HYPERCALCEMIA: ICD-10-CM

## 2024-06-11 DIAGNOSIS — E78.2 MIXED HYPERLIPIDEMIA: ICD-10-CM

## 2024-06-11 LAB
25(OH)D3 SERPL-MCNC: 32.3 NG/ML
ALBUMIN SERPL-MCNC: 4.5 G/DL (ref 3.5–5.2)
ALP SERPL-CCNC: 85 U/L (ref 35–104)
ALT SERPL-CCNC: 18 U/L (ref 5–33)
ANION GAP SERPL CALCULATED.3IONS-SCNC: 12 MMOL/L (ref 7–19)
AST SERPL-CCNC: 17 U/L (ref 5–32)
BILIRUB SERPL-MCNC: 0.5 MG/DL (ref 0.2–1.2)
BUN SERPL-MCNC: 13 MG/DL (ref 6–20)
CALCIUM SERPL-MCNC: 9.6 MG/DL (ref 8.6–10)
CHLORIDE SERPL-SCNC: 102 MMOL/L (ref 98–111)
CHOLEST SERPL-MCNC: 171 MG/DL (ref 160–199)
CO2 SERPL-SCNC: 26 MMOL/L (ref 22–29)
CREAT SERPL-MCNC: 0.7 MG/DL (ref 0.5–0.9)
GLUCOSE SERPL-MCNC: 97 MG/DL (ref 74–109)
HDLC SERPL-MCNC: 52 MG/DL (ref 65–121)
LDLC SERPL CALC-MCNC: 80 MG/DL
POTASSIUM SERPL-SCNC: 3.9 MMOL/L (ref 3.5–5)
PROT SERPL-MCNC: 7.7 G/DL (ref 6.6–8.7)
PTH-INTACT SERPL-MCNC: 31.3 PG/ML (ref 15–65)
SODIUM SERPL-SCNC: 140 MMOL/L (ref 136–145)
TRIGL SERPL-MCNC: 194 MG/DL (ref 0–149)

## 2024-06-17 DIAGNOSIS — E55.9 VITAMIN D DEFICIENCY: ICD-10-CM

## 2024-06-17 DIAGNOSIS — E78.2 MIXED HYPERLIPIDEMIA: ICD-10-CM

## 2024-06-17 DIAGNOSIS — K21.9 GASTROESOPHAGEAL REFLUX DISEASE, UNSPECIFIED WHETHER ESOPHAGITIS PRESENT: ICD-10-CM

## 2024-06-17 RX ORDER — ACETAMINOPHEN 160 MG
TABLET,DISINTEGRATING ORAL DAILY
Qty: 30 CAPSULE | Refills: 5 | Status: SHIPPED | OUTPATIENT
Start: 2024-06-17

## 2024-06-17 RX ORDER — OMEPRAZOLE 40 MG/1
CAPSULE, DELAYED RELEASE ORAL DAILY
Qty: 90 CAPSULE | Refills: 2 | Status: SHIPPED | OUTPATIENT
Start: 2024-06-17

## 2024-06-17 RX ORDER — ROSUVASTATIN CALCIUM 10 MG/1
10 TABLET, COATED ORAL DAILY
Qty: 90 TABLET | Refills: 2 | Status: SHIPPED | OUTPATIENT
Start: 2024-06-17

## 2024-06-17 NOTE — TELEPHONE ENCOUNTER
Sharri Hernández called to request a refill on her medication.      Last office visit : 3/19/2024   Next office visit : 6/19/2024     Requested Prescriptions     Pending Prescriptions Disp Refills    Cholecalciferol (VITAMIN D3) 50 MCG (2000 UT) CAPS [Pharmacy Med Name: VITAMIN D3 2,000 UNIT SOFTGEL] 30 capsule 2     Sig: TAKE 1 CAPSULE BY MOUTH EVERY DAY            Linette Hardin

## 2024-06-19 ENCOUNTER — OFFICE VISIT (OUTPATIENT)
Dept: PRIMARY CARE CLINIC | Age: 54
End: 2024-06-19
Payer: MEDICAID

## 2024-06-19 VITALS
DIASTOLIC BLOOD PRESSURE: 70 MMHG | HEART RATE: 71 BPM | SYSTOLIC BLOOD PRESSURE: 120 MMHG | OXYGEN SATURATION: 97 % | HEIGHT: 67 IN | WEIGHT: 211 LBS | TEMPERATURE: 98.2 F | BODY MASS INDEX: 33.12 KG/M2

## 2024-06-19 DIAGNOSIS — F90.0 ATTENTION DEFICIT HYPERACTIVITY DISORDER (ADHD), PREDOMINANTLY INATTENTIVE TYPE: ICD-10-CM

## 2024-06-19 DIAGNOSIS — E55.9 VITAMIN D DEFICIENCY: ICD-10-CM

## 2024-06-19 DIAGNOSIS — E78.2 MIXED HYPERLIPIDEMIA: ICD-10-CM

## 2024-06-19 DIAGNOSIS — D50.9 IRON DEFICIENCY ANEMIA, UNSPECIFIED IRON DEFICIENCY ANEMIA TYPE: ICD-10-CM

## 2024-06-19 DIAGNOSIS — F41.8 SITUATIONAL ANXIETY: ICD-10-CM

## 2024-06-19 DIAGNOSIS — K21.9 GASTROESOPHAGEAL REFLUX DISEASE, UNSPECIFIED WHETHER ESOPHAGITIS PRESENT: ICD-10-CM

## 2024-06-19 DIAGNOSIS — K76.0 HEPATIC STEATOSIS: ICD-10-CM

## 2024-06-19 PROCEDURE — 99214 OFFICE O/P EST MOD 30 MIN: CPT | Performed by: NURSE PRACTITIONER

## 2024-06-19 PROCEDURE — 3017F COLORECTAL CA SCREEN DOC REV: CPT | Performed by: NURSE PRACTITIONER

## 2024-06-19 PROCEDURE — G8417 CALC BMI ABV UP PARAM F/U: HCPCS | Performed by: NURSE PRACTITIONER

## 2024-06-19 PROCEDURE — 1036F TOBACCO NON-USER: CPT | Performed by: NURSE PRACTITIONER

## 2024-06-19 PROCEDURE — G8427 DOCREV CUR MEDS BY ELIG CLIN: HCPCS | Performed by: NURSE PRACTITIONER

## 2024-06-19 ASSESSMENT — ENCOUNTER SYMPTOMS
SORE THROAT: 0
COUGH: 0
DIARRHEA: 0
WHEEZING: 0
NAUSEA: 0
CHEST TIGHTNESS: 0
SHORTNESS OF BREATH: 0
ABDOMINAL PAIN: 0

## 2024-06-19 NOTE — PROGRESS NOTES
97%   BMI 33.05 kg/m²     Physical Exam  Vitals reviewed.   Constitutional:       General: She is not in acute distress.     Appearance: Normal appearance. She is well-developed.   HENT:      Head: Normocephalic.   Eyes:      Conjunctiva/sclera: Conjunctivae normal.      Pupils: Pupils are equal, round, and reactive to light.   Neck:      Thyroid: No thyromegaly.      Vascular: No carotid bruit or JVD.      Trachea: No tracheal deviation.   Cardiovascular:      Rate and Rhythm: Normal rate and regular rhythm.      Heart sounds: Normal heart sounds. No murmur heard.  Pulmonary:      Effort: Pulmonary effort is normal. No respiratory distress.      Breath sounds: Normal breath sounds. No wheezing or rhonchi.   Abdominal:      General: Abdomen is flat. Bowel sounds are normal. There is no distension.      Palpations: Abdomen is soft. There is no mass.      Tenderness: There is no abdominal tenderness. There is no guarding or rebound.      Hernia: No hernia is present.   Musculoskeletal:         General: Normal range of motion.      Cervical back: Normal range of motion and neck supple.   Lymphadenopathy:      Cervical: No cervical adenopathy.   Skin:     General: Skin is warm and dry.      Findings: No rash.   Neurological:      General: No focal deficit present.      Mental Status: She is alert.   Psychiatric:         Mood and Affect: Mood normal.         Behavior: Behavior normal.         Thought Content: Thought content normal.         ASSESSMENT/PLAN:  1. Hepatic steatosis  -Continue efforts with low-fat diet, weight loss  -Continue management per GI.  Has upcoming follow-up.    2. Mixed hyperlipidemia  -Stable, improved.  Continue rosuvastatin 10 mg daily.  -Continue efforts with healthy low-fat diet, weight loss.  -Fasting lipid in 6 months  - Comprehensive Metabolic Panel; Future  - Lipid Panel; Future  - TSH; Future    3. Gastroesophageal reflux disease, unspecified whether esophagitis present  -Stable,

## 2024-07-16 ENCOUNTER — OFFICE VISIT (OUTPATIENT)
Dept: GASTROENTEROLOGY | Age: 54
End: 2024-07-16
Payer: MEDICAID

## 2024-07-16 VITALS
SYSTOLIC BLOOD PRESSURE: 120 MMHG | DIASTOLIC BLOOD PRESSURE: 68 MMHG | BODY MASS INDEX: 33.59 KG/M2 | HEART RATE: 85 BPM | OXYGEN SATURATION: 98 % | WEIGHT: 214 LBS | HEIGHT: 67 IN

## 2024-07-16 DIAGNOSIS — K76.0 FATTY LIVER: Primary | ICD-10-CM

## 2024-07-16 DIAGNOSIS — R10.11 RUQ ABDOMINAL PAIN: ICD-10-CM

## 2024-07-16 DIAGNOSIS — R19.7 DIARRHEA, UNSPECIFIED TYPE: ICD-10-CM

## 2024-07-16 PROCEDURE — 1036F TOBACCO NON-USER: CPT | Performed by: NURSE PRACTITIONER

## 2024-07-16 PROCEDURE — G8427 DOCREV CUR MEDS BY ELIG CLIN: HCPCS | Performed by: NURSE PRACTITIONER

## 2024-07-16 PROCEDURE — 99213 OFFICE O/P EST LOW 20 MIN: CPT | Performed by: NURSE PRACTITIONER

## 2024-07-16 PROCEDURE — G8417 CALC BMI ABV UP PARAM F/U: HCPCS | Performed by: NURSE PRACTITIONER

## 2024-07-16 PROCEDURE — 3017F COLORECTAL CA SCREEN DOC REV: CPT | Performed by: NURSE PRACTITIONER

## 2024-07-16 NOTE — PROGRESS NOTES
Subjective:     Patient ID: Sharri Hernández is a 54 y.o. female  PCP: Nithya Carrasco APRN  Referring Provider: No ref. provider found    HPI  Patient presents to the office today with the following complaints: Follow-up (2 month )      Patient seen in the office today for follow up   Reports right sided abd pain has improved   Questran works well for her diarrhea   She also is continuing to take her Fiber supplement   Reports she is doing much better and denies any further needs or complaints at this time   We will follow up in November for her fatty liver     Assessment:     1. Fatty liver  2. Diarrhea, unspecified type  3. RUQ abdominal pain       Review of Systems   Constitutional:  Negative for activity change, appetite change, fatigue, fever and unexpected weight change.   HENT:  Negative for trouble swallowing.    Respiratory:  Negative for cough, choking and shortness of breath.    Cardiovascular:  Negative for chest pain.   Gastrointestinal:  Negative for abdominal distention, abdominal pain, anal bleeding, blood in stool, constipation, diarrhea, nausea, rectal pain and vomiting.   Allergic/Immunologic: Negative for food allergies.   All other systems reviewed and are negative.      Plan:   Repeat ultrasound of liver in November   Follow up after ultrasound     Orders  No orders of the defined types were placed in this encounter.    Medications  No orders of the defined types were placed in this encounter.        Patient History:     Past Medical History:   Diagnosis Date    Anemia     Iron deficiency    Back pain     Chronic cholecystitis without calculus 6/6/2016    Degenerative disc disease     Glaucoma (increased eye pressure)     candidate for due to high pressure/ preventative eye drops    Hepatic steatosis     Kidney stones 9/27/2019    Renal stones        Past Surgical History:   Procedure Laterality Date    CHOLECYSTECTOMY  06/06/2016    COLONOSCOPY  10/02/2018    Dr Love-  normal    KIDNEY

## 2024-07-23 ASSESSMENT — ENCOUNTER SYMPTOMS
BLOOD IN STOOL: 0
SHORTNESS OF BREATH: 0
CONSTIPATION: 0
ABDOMINAL DISTENTION: 0
VOMITING: 0
COUGH: 0
RECTAL PAIN: 0
CHOKING: 0
DIARRHEA: 0
ABDOMINAL PAIN: 0
NAUSEA: 0
TROUBLE SWALLOWING: 0
ANAL BLEEDING: 0

## 2024-10-22 ENCOUNTER — TELEPHONE (OUTPATIENT)
Dept: GASTROENTEROLOGY | Age: 54
End: 2024-10-22

## 2024-10-22 DIAGNOSIS — K76.0 FATTY LIVER: Primary | ICD-10-CM

## 2024-10-22 NOTE — TELEPHONE ENCOUNTER
----- Message from Divina GOEL sent at 7/16/2024  2:24 PM CDT -----  Regarding: checkout note 7/16/24  Follow up in November with liver ultrasound prior to OV November apt set for 11/12/24

## 2024-11-04 ENCOUNTER — HOSPITAL ENCOUNTER (OUTPATIENT)
Dept: ULTRASOUND IMAGING | Age: 54
Discharge: HOME | End: 2024-11-04
Payer: MEDICAID

## 2024-11-04 DIAGNOSIS — K76.0 FATTY LIVER: ICD-10-CM

## 2024-11-04 PROCEDURE — 76705 ECHO EXAM OF ABDOMEN: CPT

## 2024-11-12 ENCOUNTER — OFFICE VISIT (OUTPATIENT)
Dept: GASTROENTEROLOGY | Age: 54
End: 2024-11-12
Payer: MEDICAID

## 2024-11-12 VITALS
WEIGHT: 206 LBS | HEART RATE: 62 BPM | OXYGEN SATURATION: 96 % | BODY MASS INDEX: 32.33 KG/M2 | HEIGHT: 67 IN | DIASTOLIC BLOOD PRESSURE: 80 MMHG | SYSTOLIC BLOOD PRESSURE: 105 MMHG

## 2024-11-12 DIAGNOSIS — K76.0 FATTY LIVER: Primary | ICD-10-CM

## 2024-11-12 PROCEDURE — G8484 FLU IMMUNIZE NO ADMIN: HCPCS | Performed by: NURSE PRACTITIONER

## 2024-11-12 PROCEDURE — G8427 DOCREV CUR MEDS BY ELIG CLIN: HCPCS | Performed by: NURSE PRACTITIONER

## 2024-11-12 PROCEDURE — 1036F TOBACCO NON-USER: CPT | Performed by: NURSE PRACTITIONER

## 2024-11-12 PROCEDURE — G8417 CALC BMI ABV UP PARAM F/U: HCPCS | Performed by: NURSE PRACTITIONER

## 2024-11-12 PROCEDURE — 99213 OFFICE O/P EST LOW 20 MIN: CPT | Performed by: NURSE PRACTITIONER

## 2024-11-12 PROCEDURE — 3017F COLORECTAL CA SCREEN DOC REV: CPT | Performed by: NURSE PRACTITIONER

## 2024-11-12 RX ORDER — IBUPROFEN 200 MG
200 TABLET ORAL PRN
COMMUNITY

## 2024-11-12 ASSESSMENT — ENCOUNTER SYMPTOMS
ABDOMINAL DISTENTION: 0
ANAL BLEEDING: 0
ABDOMINAL PAIN: 0
NAUSEA: 0
COUGH: 0
CONSTIPATION: 0
RECTAL PAIN: 0
SHORTNESS OF BREATH: 0
DIARRHEA: 0
BLOOD IN STOOL: 0
VOMITING: 0
TROUBLE SWALLOWING: 0
CHOKING: 0

## 2024-11-12 NOTE — PROGRESS NOTES
Subjective:     Patient ID: Sharri Hernández is a 54 y.o. female  PCP: Nithya Carrasco APRN  Referring Provider: No ref. provider found    HPI  Patient presents to the office today with the following complaints: Follow-up      Patient seen in the office today for follow up on her fatty liver   Labs are appropriate   Liver ultrasound is appropriate   She denies any GI issues   Reports her acid reflux is controled with Omeprazole 40 mg   States her bowels are doing well also     Colonoscopy is up to date   Recall due 2028   Assessment:     1. Fatty liver       Review of Systems   Constitutional:  Negative for activity change, appetite change, fatigue, fever and unexpected weight change.   HENT:  Negative for trouble swallowing.    Respiratory:  Negative for cough, choking and shortness of breath.    Cardiovascular:  Negative for chest pain.   Gastrointestinal:  Negative for abdominal distention, abdominal pain, anal bleeding, blood in stool, constipation, diarrhea, nausea, rectal pain and vomiting.   Allergic/Immunologic: Negative for food allergies.   All other systems reviewed and are negative.      Plan:   Follow up in 1 year with liver ultrasound and liver labs prior to OV   Orders  No orders of the defined types were placed in this encounter.    Medications  No orders of the defined types were placed in this encounter.        Patient History:     Past Medical History:   Diagnosis Date    Anemia     Iron deficiency    Back pain     Chronic cholecystitis without calculus 6/6/2016    Degenerative disc disease     Glaucoma (increased eye pressure)     candidate for due to high pressure/ preventative eye drops    Hepatic steatosis     Kidney stones 9/27/2019    Renal stones        Past Surgical History:   Procedure Laterality Date    CHOLECYSTECTOMY  06/06/2016    COLONOSCOPY  10/02/2018    Dr Love-  normal    KIDNEY STONE SURGERY      NE CYSTO W/URETEROSCOPY W/LITHOTRIPSY Left 10/31/2017    CYSTOSCOPY

## 2024-12-03 DIAGNOSIS — E78.2 MIXED HYPERLIPIDEMIA: ICD-10-CM

## 2024-12-03 LAB
ALBUMIN SERPL-MCNC: 4.3 G/DL (ref 3.5–5.2)
ALP SERPL-CCNC: 90 U/L (ref 35–104)
ALT SERPL-CCNC: 22 U/L (ref 5–33)
ANION GAP SERPL CALCULATED.3IONS-SCNC: 11 MMOL/L (ref 7–19)
AST SERPL-CCNC: 20 U/L (ref 5–32)
BILIRUB SERPL-MCNC: 0.5 MG/DL (ref 0.2–1.2)
BUN SERPL-MCNC: 14 MG/DL (ref 6–20)
CALCIUM SERPL-MCNC: 9.5 MG/DL (ref 8.6–10)
CHLORIDE SERPL-SCNC: 103 MMOL/L (ref 98–111)
CHOLEST SERPL-MCNC: 180 MG/DL (ref 0–199)
CO2 SERPL-SCNC: 29 MMOL/L (ref 22–29)
CREAT SERPL-MCNC: 0.7 MG/DL (ref 0.5–0.9)
GLUCOSE SERPL-MCNC: 92 MG/DL (ref 70–99)
HDLC SERPL-MCNC: 56 MG/DL (ref 40–60)
LDLC SERPL CALC-MCNC: 91 MG/DL
POTASSIUM SERPL-SCNC: 3.8 MMOL/L (ref 3.5–5)
PROT SERPL-MCNC: 7.9 G/DL (ref 6.4–8.3)
SODIUM SERPL-SCNC: 143 MMOL/L (ref 136–145)
TRIGL SERPL-MCNC: 164 MG/DL (ref 0–149)
TSH SERPL DL<=0.005 MIU/L-ACNC: 0.81 UIU/ML (ref 0.27–4.2)

## 2024-12-06 ENCOUNTER — TELEPHONE (OUTPATIENT)
Dept: HEMATOLOGY | Age: 54
End: 2024-12-06

## 2024-12-06 NOTE — TELEPHONE ENCOUNTER
I called patient and left detailed voicemail about their appointment on 12/10/24. I made patient aware not to arrive any earlier than the appointment time and to come at the time of the follow up not the time of the lab appointment if it is different than the follow up appt time. I also made patient aware to eat and drink plenty of water to hydrate properly before coming to these appointments because this will make their lab draw much easier.  Made patient aware that we are now located at the [facility] at [address]. Located between [facility] and the [facility]. Front entrance faces [facility].

## 2024-12-09 NOTE — PROGRESS NOTES
sounds: Normal breath sounds.   Abdominal:      General: Bowel sounds are normal. There is no distension.      Palpations: Abdomen is soft.      Tenderness: There is no abdominal tenderness.   Musculoskeletal:         General: No tenderness.      Cervical back: Neck supple.   Skin:     General: Skin is warm and dry.      Findings: No rash.   Neurological:      Mental Status: She is alert and oriented to person, place, and time.      Coordination: Coordination normal.   Psychiatric:         Behavior: Behavior normal.         Thought Content: Thought content normal.            CBC reviewed by me  Lab Results   Component Value Date    WBC 5.07 12/10/2024    HGB 13.3 12/10/2024    HCT 38.6 12/10/2024    MCV 84.3 12/10/2024     12/10/2024    LYMPHOPCT 38.3 12/10/2024    RBC 4.58 12/10/2024    MCH 29.0 12/10/2024    MCHC 34.5 12/10/2024    RDW 12.4 12/10/2024     Lab Results   Component Value Date     12/03/2024    K 3.8 12/03/2024     12/03/2024    CO2 29 12/03/2024    BUN 14 12/03/2024    CREATININE 0.7 12/03/2024    GLUCOSE 92 12/03/2024    CALCIUM 9.5 12/03/2024    BILITOT 0.5 12/03/2024    ALKPHOS 90 12/03/2024    AST 20 12/03/2024    ALT 22 12/03/2024    LABGLOM >90 12/03/2024    GFRAA >59 06/07/2022    AGRATIO 1.1 11/04/2021    GLOB 3.7 11/28/2023        1. Iron deficiency anemia, unspecified iron deficiency anemia type    2. High total serum IgA           ASSESSMENT/PLAN:  History of iron deficiency anemia -at goal    HGB 13.3 with MCV 84.3.  She will continue iron 3 times a week    Colonoscopy up-to-date last completed 10/2018 -it was normal with 10-year follow-up planned    Elevated IgA, stable -not at goal        Serology 11/28/2023  SPEP-No M Artur, Negative Immunofixation  QI-IgG 1056, IgA 398, IgM 45  Free serum light chains- Kappa 18.17, Lambda 14.29, K/L 1.27  CMP-crt 0.7 with GFR >60, Ca 10.1    She continues to have a negative SPEP, normal K/L ratio's.  Minimal elevated serum IgA which

## 2024-12-10 ENCOUNTER — OFFICE VISIT (OUTPATIENT)
Dept: HEMATOLOGY | Age: 54
End: 2024-12-10
Payer: MEDICAID

## 2024-12-10 ENCOUNTER — HOSPITAL ENCOUNTER (OUTPATIENT)
Dept: INFUSION THERAPY | Age: 54
Discharge: HOME | End: 2024-12-10
Payer: MEDICAID

## 2024-12-10 VITALS
HEART RATE: 97 BPM | BODY MASS INDEX: 32.69 KG/M2 | SYSTOLIC BLOOD PRESSURE: 132 MMHG | TEMPERATURE: 97.4 F | OXYGEN SATURATION: 96 % | DIASTOLIC BLOOD PRESSURE: 80 MMHG | HEIGHT: 67 IN | WEIGHT: 208.3 LBS

## 2024-12-10 DIAGNOSIS — D50.9 IRON DEFICIENCY ANEMIA, UNSPECIFIED IRON DEFICIENCY ANEMIA TYPE: Primary | ICD-10-CM

## 2024-12-10 DIAGNOSIS — D50.9 IRON DEFICIENCY ANEMIA, UNSPECIFIED IRON DEFICIENCY ANEMIA TYPE: ICD-10-CM

## 2024-12-10 DIAGNOSIS — R76.8 HIGH TOTAL SERUM IGA: ICD-10-CM

## 2024-12-10 LAB
BASOPHILS # BLD: 0.01 K/UL (ref 0.01–0.08)
BASOPHILS NFR BLD: 0.2 % (ref 0.1–1.2)
EOSINOPHIL # BLD: 0.02 K/UL (ref 0.04–0.54)
EOSINOPHIL NFR BLD: 0.4 % (ref 0.7–7)
ERYTHROCYTE [DISTWIDTH] IN BLOOD BY AUTOMATED COUNT: 12.4 % (ref 11.7–14.4)
FERRITIN SERPL-MCNC: 168.5 NG/ML (ref 13–150)
HCT VFR BLD AUTO: 38.6 % (ref 34.1–44.9)
HGB BLD-MCNC: 13.3 G/DL (ref 11.2–15.7)
IRON SATN MFR SERPL: 36 % (ref 14–50)
IRON SERPL-MCNC: 111 UG/DL (ref 37–145)
LYMPHOCYTES # BLD: 1.94 K/UL (ref 1.18–3.74)
LYMPHOCYTES NFR BLD: 38.3 % (ref 19.3–53.1)
MCH RBC QN AUTO: 29 PG (ref 25.6–32.2)
MCHC RBC AUTO-ENTMCNC: 34.5 G/DL (ref 32.3–35.5)
MCV RBC AUTO: 84.3 FL (ref 79.4–94.8)
MONOCYTES # BLD: 0.28 K/UL (ref 0.24–0.82)
MONOCYTES NFR BLD: 5.5 % (ref 4.7–12.5)
NEUTROPHILS # BLD: 2.81 K/UL (ref 1.56–6.13)
NEUTS SEG NFR BLD: 55.4 % (ref 34–71.1)
PLATELET # BLD AUTO: 232 K/UL (ref 182–369)
PMV BLD AUTO: 9.6 FL (ref 7.4–10.4)
RBC # BLD AUTO: 4.58 M/UL (ref 3.93–5.22)
TIBC SERPL-MCNC: 309 UG/DL (ref 250–400)
WBC # BLD AUTO: 5.07 K/UL (ref 3.98–10.04)

## 2024-12-10 PROCEDURE — 99213 OFFICE O/P EST LOW 20 MIN: CPT | Performed by: PHYSICIAN ASSISTANT

## 2024-12-10 PROCEDURE — 85025 COMPLETE CBC W/AUTO DIFF WBC: CPT

## 2024-12-10 PROCEDURE — 1036F TOBACCO NON-USER: CPT | Performed by: PHYSICIAN ASSISTANT

## 2024-12-10 PROCEDURE — G8417 CALC BMI ABV UP PARAM F/U: HCPCS | Performed by: PHYSICIAN ASSISTANT

## 2024-12-10 PROCEDURE — 36415 COLL VENOUS BLD VENIPUNCTURE: CPT

## 2024-12-10 PROCEDURE — G8484 FLU IMMUNIZE NO ADMIN: HCPCS | Performed by: PHYSICIAN ASSISTANT

## 2024-12-10 PROCEDURE — G8427 DOCREV CUR MEDS BY ELIG CLIN: HCPCS | Performed by: PHYSICIAN ASSISTANT

## 2024-12-10 PROCEDURE — 3017F COLORECTAL CA SCREEN DOC REV: CPT | Performed by: PHYSICIAN ASSISTANT

## 2024-12-10 PROCEDURE — 99212 OFFICE O/P EST SF 10 MIN: CPT

## 2024-12-10 RX ORDER — RED BEET 500 MG
1000 CAPSULE ORAL DAILY
COMMUNITY

## 2024-12-10 ASSESSMENT — ENCOUNTER SYMPTOMS
NAUSEA: 0
ABDOMINAL PAIN: 0
VOICE CHANGE: 0
COLOR CHANGE: 0
ABDOMINAL DISTENTION: 0
WHEEZING: 0
COUGH: 0
SHORTNESS OF BREATH: 0
DIARRHEA: 0
BLOOD IN STOOL: 0
EYE DISCHARGE: 0
SORE THROAT: 0
CONSTIPATION: 0
EYE ITCHING: 0
TROUBLE SWALLOWING: 0
VOMITING: 0
BACK PAIN: 0
PHOTOPHOBIA: 0

## 2024-12-15 LAB
ALBUMIN SERPL-MCNC: 3.98 G/DL (ref 3.75–5.01)
ALPHA1 GLOB SERPL ELPH-MCNC: 0.26 G/DL (ref 0.19–0.46)
ALPHA2 GLOB SERPL ELPH-MCNC: 0.81 G/DL (ref 0.48–1.05)
B-GLOBULIN SERPL ELPH-MCNC: 1.04 G/DL (ref 0.48–1.1)
DEPRECATED KAPPA LC FREE/LAMBDA SER: 1.16 {RATIO} (ref 0.26–1.65)
EER MONOCLONAL PROTEIN AND FLC, SERUM: NORMAL
GAMMA GLOB SERPL ELPH-MCNC: 1.02 G/DL (ref 0.62–1.51)
IGA SERPL-MCNC: 373 MG/DL (ref 68–408)
IGG SERPL-MCNC: 1074 MG/DL (ref 768–1632)
IGM SERPL-MCNC: 56 MG/DL (ref 35–263)
INTERPRETATION SERPL IFE-IMP: NORMAL
INTERPRETATION SERPL IFE-IMP: NORMAL
KAPPA LC FREE SER-MCNC: 17.88 MG/L (ref 3.3–19.4)
LAMBDA LC FREE SERPL-MCNC: 15.44 MG/L (ref 5.71–26.3)
MONOCLONAL PROTEIN, SERUM: NORMAL G/DL
PROT SERPL-MCNC: 7.1 G/DL (ref 6.3–8.2)

## 2024-12-18 ENCOUNTER — OFFICE VISIT (OUTPATIENT)
Dept: PRIMARY CARE CLINIC | Age: 54
End: 2024-12-18

## 2024-12-18 VITALS
DIASTOLIC BLOOD PRESSURE: 80 MMHG | OXYGEN SATURATION: 96 % | HEIGHT: 67 IN | TEMPERATURE: 97.5 F | SYSTOLIC BLOOD PRESSURE: 124 MMHG | BODY MASS INDEX: 32.96 KG/M2 | HEART RATE: 88 BPM | WEIGHT: 210 LBS

## 2024-12-18 DIAGNOSIS — Z00.00 WELL ADULT EXAM: ICD-10-CM

## 2024-12-18 DIAGNOSIS — J06.9 VIRAL URI WITH COUGH: ICD-10-CM

## 2024-12-18 DIAGNOSIS — K76.0 HEPATIC STEATOSIS: ICD-10-CM

## 2024-12-18 DIAGNOSIS — E55.9 VITAMIN D DEFICIENCY: ICD-10-CM

## 2024-12-18 DIAGNOSIS — F90.0 ATTENTION DEFICIT HYPERACTIVITY DISORDER (ADHD), PREDOMINANTLY INATTENTIVE TYPE: ICD-10-CM

## 2024-12-18 DIAGNOSIS — D50.9 IRON DEFICIENCY ANEMIA, UNSPECIFIED IRON DEFICIENCY ANEMIA TYPE: ICD-10-CM

## 2024-12-18 DIAGNOSIS — F41.8 SITUATIONAL ANXIETY: ICD-10-CM

## 2024-12-18 DIAGNOSIS — E78.2 MIXED HYPERLIPIDEMIA: ICD-10-CM

## 2024-12-18 DIAGNOSIS — Z11.59 NEED FOR HEPATITIS C SCREENING TEST: ICD-10-CM

## 2024-12-18 SDOH — ECONOMIC STABILITY: INCOME INSECURITY: HOW HARD IS IT FOR YOU TO PAY FOR THE VERY BASICS LIKE FOOD, HOUSING, MEDICAL CARE, AND HEATING?: NOT VERY HARD

## 2024-12-18 SDOH — ECONOMIC STABILITY: FOOD INSECURITY: WITHIN THE PAST 12 MONTHS, THE FOOD YOU BOUGHT JUST DIDN'T LAST AND YOU DIDN'T HAVE MONEY TO GET MORE.: NEVER TRUE

## 2024-12-18 SDOH — ECONOMIC STABILITY: FOOD INSECURITY: WITHIN THE PAST 12 MONTHS, YOU WORRIED THAT YOUR FOOD WOULD RUN OUT BEFORE YOU GOT MONEY TO BUY MORE.: NEVER TRUE

## 2024-12-18 ASSESSMENT — ENCOUNTER SYMPTOMS
ABDOMINAL PAIN: 0
DIARRHEA: 0
WHEEZING: 0
COUGH: 1
SORE THROAT: 0
NAUSEA: 0
SHORTNESS OF BREATH: 0
CHEST TIGHTNESS: 0

## 2024-12-18 NOTE — PROGRESS NOTES
Ms.Lucy Hernández is a 54 y.o. female who presents today for  Chief Complaint   Patient presents with    Follow-up       HPI:  Here for routine follow-up    She has had a mild cough and congestion for the past several days.  No fever.  No shortness of breath or wheeze.    Hyperlipidemia  Current regimen:  -Rosuvastatin 10 mg daily  Tolerating well without side effects.  Attempting to reduce processed sugar and cholesterol from diet.    Followed by [facility] for hepatic steatosis.  She has been working on her diet and weight loss.  GI is following surveillance US which has been stable.    [facility]  She remains on ferrous sulfate 3 days a week, tolerating.  Iron levels have been stable.    She is followed by [facility] psychiatry for ADHD and counseling.  She remains on Strattera.  She is taking trazodone for insomnia which psychiatry manages.    Followed by [facility] breast clinic for history of right breast density on mammogram.  Has been stable on f/u imaging.    Labs reviewed  Lab Results   Component Value Date    WBC 5.07 12/10/2024    HGB 13.3 12/10/2024    HCT 38.6 12/10/2024    MCV 84.3 12/10/2024     12/10/2024     Lab Results   Component Value Date     12/03/2024    K 3.8 12/03/2024     12/03/2024    CO2 29 12/03/2024    BUN 14 12/03/2024    CREATININE 0.7 12/03/2024    GLUCOSE 92 12/03/2024    CALCIUM 9.5 12/03/2024    BILITOT 0.5 12/03/2024    ALKPHOS 90 12/03/2024    AST 20 12/03/2024    ALT 22 12/03/2024    LABGLOM >90 12/03/2024    GFRAA >59 06/07/2022    AGRATIO 1.1 11/04/2021    GLOB 3.7 11/28/2023     Lab Results   Component Value Date    CHOL 180 12/03/2024    TRIG 164 (H) 12/03/2024    HDL 56 12/03/2024    LDL 91 12/03/2024     Lab Results   Component Value Date    IRON 111 12/10/2024    TIBC 309 12/10/2024    FERRITIN 168.5 (H) 12/10/2024         Review of Systems   Constitutional:  Negative for chills and fever.   HENT:  Positive for congestion. Negative

## 2024-12-24 DIAGNOSIS — E55.9 VITAMIN D DEFICIENCY: ICD-10-CM

## 2025-01-01 RX ORDER — ACETAMINOPHEN 160 MG
TABLET,DISINTEGRATING ORAL DAILY
Qty: 30 CAPSULE | Refills: 5 | Status: SHIPPED | OUTPATIENT
Start: 2025-01-01

## 2025-01-13 ENCOUNTER — HOSPITAL ENCOUNTER (OUTPATIENT)
Dept: WOMENS IMAGING | Age: 55
Discharge: HOME | End: 2025-01-13
Payer: MEDICAID

## 2025-01-13 DIAGNOSIS — Z12.31 VISIT FOR SCREENING MAMMOGRAM: ICD-10-CM

## 2025-01-13 PROCEDURE — 77063 BREAST TOMOSYNTHESIS BI: CPT

## 2025-03-18 ENCOUNTER — OFFICE VISIT (OUTPATIENT)
Dept: OBGYN CLINIC | Age: 55
End: 2025-03-18
Payer: MEDICAID

## 2025-03-18 VITALS
HEART RATE: 73 BPM | DIASTOLIC BLOOD PRESSURE: 80 MMHG | BODY MASS INDEX: 31.55 KG/M2 | SYSTOLIC BLOOD PRESSURE: 122 MMHG | WEIGHT: 201 LBS | HEIGHT: 67 IN

## 2025-03-18 DIAGNOSIS — Z11.51 SCREENING FOR HPV (HUMAN PAPILLOMAVIRUS): ICD-10-CM

## 2025-03-18 DIAGNOSIS — Z12.4 SCREENING FOR CERVICAL CANCER: ICD-10-CM

## 2025-03-18 DIAGNOSIS — Z01.419 ENCOUNTER FOR WELL WOMAN EXAM WITH ROUTINE GYNECOLOGICAL EXAM: Primary | ICD-10-CM

## 2025-03-18 DIAGNOSIS — Z12.39 ENCOUNTER FOR SCREENING BREAST EXAMINATION: ICD-10-CM

## 2025-03-18 PROCEDURE — 99396 PREV VISIT EST AGE 40-64: CPT

## 2025-03-18 SDOH — ECONOMIC STABILITY: FOOD INSECURITY: WITHIN THE PAST 12 MONTHS, THE FOOD YOU BOUGHT JUST DIDN'T LAST AND YOU DIDN'T HAVE MONEY TO GET MORE.: NEVER TRUE

## 2025-03-18 SDOH — ECONOMIC STABILITY: FOOD INSECURITY: WITHIN THE PAST 12 MONTHS, YOU WORRIED THAT YOUR FOOD WOULD RUN OUT BEFORE YOU GOT MONEY TO BUY MORE.: NEVER TRUE

## 2025-03-18 ASSESSMENT — ENCOUNTER SYMPTOMS
ABDOMINAL PAIN: 0
NAUSEA: 0
BACK PAIN: 0
CHEST TIGHTNESS: 0
CONSTIPATION: 0
SHORTNESS OF BREATH: 0
RECTAL PAIN: 0
DIARRHEA: 0
GASTROINTESTINAL NEGATIVE: 1
RESPIRATORY NEGATIVE: 1

## 2025-03-18 ASSESSMENT — ANXIETY QUESTIONNAIRES
5. BEING SO RESTLESS THAT IT IS HARD TO SIT STILL: NOT AT ALL
4. TROUBLE RELAXING: NOT AT ALL
GAD7 TOTAL SCORE: 1
1. FEELING NERVOUS, ANXIOUS, OR ON EDGE: NOT AT ALL
6. BECOMING EASILY ANNOYED OR IRRITABLE: SEVERAL DAYS
2. NOT BEING ABLE TO STOP OR CONTROL WORRYING: NOT AT ALL
7. FEELING AFRAID AS IF SOMETHING AWFUL MIGHT HAPPEN: NOT AT ALL
3. WORRYING TOO MUCH ABOUT DIFFERENT THINGS: NOT AT ALL

## 2025-03-18 ASSESSMENT — PATIENT HEALTH QUESTIONNAIRE - PHQ9
2. FEELING DOWN, DEPRESSED OR HOPELESS: NOT AT ALL
1. LITTLE INTEREST OR PLEASURE IN DOING THINGS: NOT AT ALL
SUM OF ALL RESPONSES TO PHQ QUESTIONS 1-9: 0

## 2025-03-18 NOTE — PROGRESS NOTES
[facility]  CNM Office Note    Sharri Hernández is a 55 y.o. female who presents today for her medical conditions/ complaints as noted below.  Chief Complaint   Patient presents with    Annual Exam     Depression screening score: 0  Anxiety screening score: 1  Last mammogram: 25   Last pap smear: 3/6/23    Contraception: Postmenopausal  : 2    Para: 2   AB: 0   Last bone density: N/A    Last colonoscopy: 10/2/18     Sexual Active: Defer    HPI  Sharri Hernández presents today for annual exam. She is due for pap smear and mammogram. She denies history of abnormal pap smears and reports history of abnormal mammograms and is under the care of Javi Herrmann. She has appt with him next week and had mammogram 2 months ago. She reports family history of breast cancer in maternal aunts. Patient is a poor historian on family history. She is postmenopausal and denies any vaginal bleeding. She denies issues with bladder, bowel, or intercourse. She feels her sleep pattern and quality is Fair.      Problems/Complaints today:  1. Encounter for well woman exam with routine gynecological exam  2. Screening for cervical cancer  -     PAP SMEAR  3. Screening for HPV (human papillomavirus)  -     Human papillomavirus (HPV) DNA Probe Thin Prep High Risk  4. Encounter for screening breast examination       Patient Active Problem List   Diagnosis    Dumping syndrome    Iron deficiency anemia    Vitamin D deficiency    GERD (gastroesophageal reflux disease)    Dizziness    Situational anxiety    Attention deficit hyperactivity disorder (ADHD), predominantly inattentive type    Mixed hyperlipidemia    Hepatic steatosis       Patient's last menstrual period was 2019 (within days).      Past Medical History:   Diagnosis Date    Anemia     Iron deficiency    Back pain     Chronic cholecystitis without calculus 2016    Degenerative disc disease     Glaucoma (increased eye pressure)     candidate for due to

## 2025-03-18 NOTE — PROGRESS NOTES
Pt presents today for pap smear and breast exam.    Pt is okay with digital scribe.    Depression screening score: 0  Anxiety screening score: 1  Last mammogram: 25   Last pap smear: 3/6/23    Contraception: Postmenopausal  : 2    Para: 2   AB: 0   Last bone density: N/A    Last colonoscopy: 10/2/18     Sexual Active: Defer

## 2025-03-20 LAB
HPV HR 12 DNA SPEC QL NAA+PROBE: NOT DETECTED
HPV16 DNA SPEC QL NAA+PROBE: NOT DETECTED
HPV16+18+H RISK 12 DNA SPEC-IMP: NORMAL
HPV18 DNA SPEC QL NAA+PROBE: NOT DETECTED

## 2025-03-25 ENCOUNTER — OFFICE VISIT (OUTPATIENT)
Dept: SURGERY | Age: 55
End: 2025-03-25
Payer: MEDICAID

## 2025-03-25 ENCOUNTER — RESULTS FOLLOW-UP (OUTPATIENT)
Dept: OBGYN CLINIC | Age: 55
End: 2025-03-25

## 2025-03-25 VITALS — WEIGHT: 204 LBS | HEART RATE: 74 BPM | OXYGEN SATURATION: 98 % | BODY MASS INDEX: 32.02 KG/M2 | HEIGHT: 67 IN

## 2025-03-25 DIAGNOSIS — Z12.31 VISIT FOR SCREENING MAMMOGRAM: Primary | ICD-10-CM

## 2025-03-25 DIAGNOSIS — N60.12 FIBROCYSTIC BREAST CHANGES OF BOTH BREASTS: ICD-10-CM

## 2025-03-25 DIAGNOSIS — N60.11 FIBROCYSTIC BREAST CHANGES OF BOTH BREASTS: ICD-10-CM

## 2025-03-25 PROCEDURE — G8427 DOCREV CUR MEDS BY ELIG CLIN: HCPCS | Performed by: PHYSICIAN ASSISTANT

## 2025-03-25 PROCEDURE — 99213 OFFICE O/P EST LOW 20 MIN: CPT | Performed by: PHYSICIAN ASSISTANT

## 2025-03-25 PROCEDURE — G8417 CALC BMI ABV UP PARAM F/U: HCPCS | Performed by: PHYSICIAN ASSISTANT

## 2025-03-25 PROCEDURE — 3017F COLORECTAL CA SCREEN DOC REV: CPT | Performed by: PHYSICIAN ASSISTANT

## 2025-03-25 PROCEDURE — 1036F TOBACCO NON-USER: CPT | Performed by: PHYSICIAN ASSISTANT

## 2025-03-25 NOTE — PROGRESS NOTES
Sharri Hernández comes today for her follow-up breast exam.  She has had no new breast complaints.  She reports no new palpable masses.  There is no skin or nipple changes.  There is no nipple discharge.  She has no appreciable evidence of supraclavicular or axillary adenopathy.    Patient Active Problem List    Diagnosis Date Noted    Hepatic steatosis 06/19/2024    Mixed hyperlipidemia 06/19/2023    Attention deficit hyperactivity disorder (ADHD), predominantly inattentive type 06/18/2021    Situational anxiety 12/15/2020    Dumping syndrome 02/13/2020    Iron deficiency anemia 02/13/2020    Vitamin D deficiency 02/13/2020    GERD (gastroesophageal reflux disease) 02/13/2020    Dizziness 02/13/2020       Current Outpatient Medications   Medication Sig Dispense Refill    Multiple Vitamins-Minerals (EQ MULTIVITAMINS ADULT GUMMY) CHEW Take by mouth      VITAMIN D3 50 MCG (2000 UT) CAPS capsule TAKE 1 CAPSULE BY MOUTH EVERY DAY 30 capsule 5    FIBER PO Take 4 g by mouth daily      Misc Natural Products (BEET ROOT) 500 MG CAPS Take 1,000 mg by mouth daily      NONFORMULARY 7,500 mcg daily Ultra eye + visiosupport      omeprazole (PRILOSEC) 40 MG delayed release capsule TAKE 1 CAPSULE BY MOUTH EVERY DAY 90 capsule 2    rosuvastatin (CRESTOR) 10 MG tablet TAKE 1 TABLET BY MOUTH EVERY DAY 90 tablet 2    cholestyramine (QUESTRAN) 4 g packet TAKE 1 PACKET BY MOUTH 2 TIMES DAILY. 60 packet 5    naproxen (NAPROSYN) 250 MG tablet Take 1 tablet by mouth 2 times daily (with meals) Patient states she is taking 220 mg three days a week stated on 12/27/23      acetaminophen (TYLENOL) 500 MG tablet Take 1 tablet by mouth every 6 hours as needed for Pain      vitamin B-12 (CYANOCOBALAMIN) 100 MCG tablet Take 1 tablet by mouth daily      traZODone (DESYREL) 50 MG tablet Take 1 tablet by mouth nightly      ferrous sulfate (IRON 325) 325 (65 Fe) MG tablet Take 1 tablet 3 days a week. 90 tablet 2    vitamin E 1000 units capsule Take 1

## 2025-06-06 RX ORDER — CHOLESTYRAMINE 4 G/9G
1 POWDER, FOR SUSPENSION ORAL 2 TIMES DAILY
Qty: 60 PACKET | Refills: 5 | Status: SHIPPED | OUTPATIENT
Start: 2025-06-06

## 2025-06-09 DIAGNOSIS — Z00.00 WELL ADULT EXAM: ICD-10-CM

## 2025-06-09 DIAGNOSIS — Z11.59 NEED FOR HEPATITIS C SCREENING TEST: ICD-10-CM

## 2025-06-09 DIAGNOSIS — E78.2 MIXED HYPERLIPIDEMIA: ICD-10-CM

## 2025-06-09 LAB
25(OH)D3 SERPL-MCNC: 32 NG/ML
ALBUMIN SERPL-MCNC: 4.2 G/DL (ref 3.5–5.2)
ALP SERPL-CCNC: 84 U/L (ref 35–104)
ALT SERPL-CCNC: 25 U/L (ref 10–35)
ANION GAP SERPL CALCULATED.3IONS-SCNC: 10 MMOL/L (ref 8–16)
AST SERPL-CCNC: 24 U/L (ref 10–35)
BASOPHILS # BLD: 0 K/UL (ref 0–0.2)
BASOPHILS NFR BLD: 0.3 % (ref 0–1)
BILIRUB SERPL-MCNC: 0.5 MG/DL (ref 0.2–1.2)
BUN SERPL-MCNC: 11 MG/DL (ref 6–20)
CALCIUM SERPL-MCNC: 9.6 MG/DL (ref 8.6–10)
CHLORIDE SERPL-SCNC: 104 MMOL/L (ref 98–107)
CHOLEST SERPL-MCNC: 166 MG/DL (ref 0–199)
CO2 SERPL-SCNC: 27 MMOL/L (ref 22–29)
CREAT SERPL-MCNC: 0.6 MG/DL (ref 0.5–0.9)
EOSINOPHIL # BLD: 0 K/UL (ref 0–0.6)
EOSINOPHIL NFR BLD: 0.8 % (ref 0–5)
ERYTHROCYTE [DISTWIDTH] IN BLOOD BY AUTOMATED COUNT: 12.4 % (ref 11.5–14.5)
GLUCOSE SERPL-MCNC: 97 MG/DL (ref 70–99)
HCT VFR BLD AUTO: 38.7 % (ref 37–47)
HCV AB SERPL QL IA: NORMAL
HDLC SERPL-MCNC: 46 MG/DL (ref 40–60)
HGB BLD-MCNC: 13.1 G/DL (ref 12–16)
IMM GRANULOCYTES # BLD: 0 K/UL
LDLC SERPL CALC-MCNC: 77 MG/DL
LYMPHOCYTES # BLD: 1.3 K/UL (ref 1.1–4.5)
LYMPHOCYTES NFR BLD: 33.2 % (ref 20–40)
MCH RBC QN AUTO: 29.1 PG (ref 27–31)
MCHC RBC AUTO-ENTMCNC: 33.9 G/DL (ref 33–37)
MCV RBC AUTO: 86 FL (ref 81–99)
MONOCYTES # BLD: 0.4 K/UL (ref 0–0.9)
MONOCYTES NFR BLD: 9.4 % (ref 0–10)
NEUTROPHILS # BLD: 2.2 K/UL (ref 1.5–7.5)
NEUTS SEG NFR BLD: 56 % (ref 50–65)
PLATELET # BLD AUTO: 233 K/UL (ref 130–400)
PMV BLD AUTO: 10.1 FL (ref 9.4–12.3)
POTASSIUM SERPL-SCNC: 4.1 MMOL/L (ref 3.5–5.1)
PROT SERPL-MCNC: 7 G/DL (ref 6.4–8.3)
RBC # BLD AUTO: 4.5 M/UL (ref 4.2–5.4)
SODIUM SERPL-SCNC: 141 MMOL/L (ref 136–145)
T4 FREE SERPL-MCNC: 1.17 NG/DL (ref 0.93–1.7)
TRIGL SERPL-MCNC: 216 MG/DL (ref 0–149)
TSH SERPL DL<=0.005 MIU/L-ACNC: 0.75 UIU/ML (ref 0.27–4.2)
WBC # BLD AUTO: 4 K/UL (ref 4.8–10.8)

## 2025-06-09 ASSESSMENT — PATIENT HEALTH QUESTIONNAIRE - PHQ9
1. LITTLE INTEREST OR PLEASURE IN DOING THINGS: NOT AT ALL
1. LITTLE INTEREST OR PLEASURE IN DOING THINGS: NOT AT ALL
2. FEELING DOWN, DEPRESSED OR HOPELESS: NOT AT ALL
SUM OF ALL RESPONSES TO PHQ9 QUESTIONS 1 & 2: 0
2. FEELING DOWN, DEPRESSED OR HOPELESS: NOT AT ALL
SUM OF ALL RESPONSES TO PHQ QUESTIONS 1-9: 0

## 2025-06-16 ENCOUNTER — RESULTS FOLLOW-UP (OUTPATIENT)
Dept: PRIMARY CARE CLINIC | Age: 55
End: 2025-06-16

## 2025-06-16 ENCOUNTER — OFFICE VISIT (OUTPATIENT)
Dept: PRIMARY CARE CLINIC | Age: 55
End: 2025-06-16
Payer: MEDICAID

## 2025-06-16 ENCOUNTER — HOSPITAL ENCOUNTER (OUTPATIENT)
Dept: GENERAL RADIOLOGY | Age: 55
Discharge: HOME | End: 2025-06-16
Payer: MEDICAID

## 2025-06-16 VITALS
BODY MASS INDEX: 31.39 KG/M2 | SYSTOLIC BLOOD PRESSURE: 114 MMHG | TEMPERATURE: 97.9 F | OXYGEN SATURATION: 97 % | WEIGHT: 200 LBS | HEIGHT: 67 IN | HEART RATE: 77 BPM | DIASTOLIC BLOOD PRESSURE: 78 MMHG

## 2025-06-16 DIAGNOSIS — D50.9 IRON DEFICIENCY ANEMIA, UNSPECIFIED IRON DEFICIENCY ANEMIA TYPE: ICD-10-CM

## 2025-06-16 DIAGNOSIS — M25.361 INSTABILITY OF RIGHT KNEE JOINT: ICD-10-CM

## 2025-06-16 DIAGNOSIS — K76.0 HEPATIC STEATOSIS: ICD-10-CM

## 2025-06-16 DIAGNOSIS — F90.0 ATTENTION DEFICIT HYPERACTIVITY DISORDER (ADHD), PREDOMINANTLY INATTENTIVE TYPE: ICD-10-CM

## 2025-06-16 DIAGNOSIS — K21.9 GASTROESOPHAGEAL REFLUX DISEASE, UNSPECIFIED WHETHER ESOPHAGITIS PRESENT: ICD-10-CM

## 2025-06-16 DIAGNOSIS — Z00.00 WELL ADULT EXAM: ICD-10-CM

## 2025-06-16 DIAGNOSIS — E78.2 MIXED HYPERLIPIDEMIA: ICD-10-CM

## 2025-06-16 PROCEDURE — 99213 OFFICE O/P EST LOW 20 MIN: CPT | Performed by: NURSE PRACTITIONER

## 2025-06-16 PROCEDURE — G8417 CALC BMI ABV UP PARAM F/U: HCPCS | Performed by: NURSE PRACTITIONER

## 2025-06-16 PROCEDURE — 3017F COLORECTAL CA SCREEN DOC REV: CPT | Performed by: NURSE PRACTITIONER

## 2025-06-16 PROCEDURE — 99396 PREV VISIT EST AGE 40-64: CPT | Performed by: NURSE PRACTITIONER

## 2025-06-16 PROCEDURE — 1036F TOBACCO NON-USER: CPT | Performed by: NURSE PRACTITIONER

## 2025-06-16 PROCEDURE — 73562 X-RAY EXAM OF KNEE 3: CPT

## 2025-06-16 PROCEDURE — G8427 DOCREV CUR MEDS BY ELIG CLIN: HCPCS | Performed by: NURSE PRACTITIONER

## 2025-06-16 NOTE — PROGRESS NOTES
Ms.Lucy Hernández is a 55 y.o. female who presents today for  Chief Complaint   Patient presents with    Annual Exam       HPI:  History of Present Illness  The patient presents for annual exam    Colonoscopy UTD 2018 ([facility]), normal, recall 10y  Pap per [facility] GYN  Mammogram and breast exam per [facility] general surgery.  History of right breast density on mammogram    She reports experiencing severe pain in her right leg several weeks ago.  She states the pain radiated up her thigh from her knee and down her leg but her knee itself was not actually painful.  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  She describes the pain as originating from her knee and radiating upwards, akin to a muscle pull, but without any associated fall or injury.  She reports instances of her knee giving out but no significant pain.  She has been using a cane for support and has noticed an improvement in her condition. She does not feel the need for physical therapy at this time.  She was seen at [facility] 4/28/2025 and diagnosed with muscle strain and treated with MDP and cyclobenzaprine.  She has since taken OTC NSAIDs as needed.  Overall pain is improving but her knee continues to give out periodically.    Hyperlipidemia  Current regimen:  -Rosuvastatin 10 mg daily  Tolerating well without side effects.     Followed annually by [facility] GI for hepatic steatosis.  GI is following surveillance US which has been stable.  She has been working on dietary changes     Followed by [facility] hematology annually for EVI.  She has not taken her ferrous sulfate in some time.     She is followed by [facility] psychiatry for ADHD and counseling.  She remains on Strattera.  She is taking trazodone for insomnia which psychiatry manages.     Followed by [facility] breast clinic for history of right breast density on mammogram.  Has been stable on f/u imaging.    Results  Labs

## 2025-06-19 DIAGNOSIS — E78.2 MIXED HYPERLIPIDEMIA: ICD-10-CM

## 2025-06-19 RX ORDER — ROSUVASTATIN CALCIUM 10 MG/1
10 TABLET, COATED ORAL DAILY
Qty: 90 TABLET | Refills: 0 | Status: SHIPPED | OUTPATIENT
Start: 2025-06-19

## 2025-07-10 DIAGNOSIS — K21.9 GASTROESOPHAGEAL REFLUX DISEASE, UNSPECIFIED WHETHER ESOPHAGITIS PRESENT: ICD-10-CM

## 2025-07-10 RX ORDER — OMEPRAZOLE 40 MG/1
CAPSULE, DELAYED RELEASE ORAL DAILY
Qty: 90 CAPSULE | Refills: 1 | Status: SHIPPED | OUTPATIENT
Start: 2025-07-10

## 2025-08-03 DIAGNOSIS — E55.9 VITAMIN D DEFICIENCY: ICD-10-CM

## 2025-08-04 RX ORDER — ACETAMINOPHEN 160 MG
TABLET,DISINTEGRATING ORAL DAILY
Qty: 30 CAPSULE | Refills: 0 | Status: SHIPPED | OUTPATIENT
Start: 2025-08-04

## 2025-09-05 ENCOUNTER — TELEPHONE (OUTPATIENT)
Dept: HEMATOLOGY | Age: 55
End: 2025-09-05

## (undated) DEVICE — DEVICE INFL ENCORE MEDI 26

## (undated) DEVICE — NITINOL STONE RETRIEVAL DEVICE: Brand: DAKOTA

## (undated) DEVICE — SURGICAL PROCEDURE PACK CYTOSCOPY

## (undated) DEVICE — SEAL ENDO INSTR SELF SEAL UROLOGY

## (undated) DEVICE — PAD,EYE,1-5/8X2 5/8,STERILE,LF,1/PK: Brand: MEDLINE

## (undated) DEVICE — Z INACTIVE USE 2660664 SOLUTION IRRIG 3000ML 0.9% SOD CHL USP UROMATIC PLAS CONT

## (undated) DEVICE — CATHETER URET 5FR L70CM OPN END SGL LUMN INJ HUB FLEXIMA

## (undated) DEVICE — BAG DRNGE COMB PK

## (undated) DEVICE — GUIDEWIRE ENDOSCP L150CM DIA0.035IN TIP 3CM PTFE NIT

## (undated) DEVICE — BALLOON DILATATION CATHETER: Brand: UROMAX ULTRA

## (undated) DEVICE — STERILE LATEX POWDER FREE SURGICAL GLOVES WITH HYDROGEL COATING: Brand: PROTEXIS